# Patient Record
Sex: FEMALE | Race: ASIAN | Employment: OTHER | URBAN - METROPOLITAN AREA
[De-identification: names, ages, dates, MRNs, and addresses within clinical notes are randomized per-mention and may not be internally consistent; named-entity substitution may affect disease eponyms.]

---

## 2017-01-03 ENCOUNTER — RECORDS - HEALTHEAST (OUTPATIENT)
Dept: GENERAL RADIOLOGY | Facility: CLINIC | Age: 77
End: 2017-01-03

## 2017-01-03 ENCOUNTER — OFFICE VISIT - HEALTHEAST (OUTPATIENT)
Dept: FAMILY MEDICINE | Facility: CLINIC | Age: 77
End: 2017-01-03

## 2017-01-03 ENCOUNTER — AMBULATORY - HEALTHEAST (OUTPATIENT)
Dept: FAMILY MEDICINE | Facility: CLINIC | Age: 77
End: 2017-01-03

## 2017-01-03 DIAGNOSIS — R14.0 ABDOMINAL DISTENSION (GASEOUS): ICD-10-CM

## 2017-01-03 DIAGNOSIS — R14.0 BLOATING: ICD-10-CM

## 2017-01-03 DIAGNOSIS — R09.89 BRUIT OF RIGHT CAROTID ARTERY: ICD-10-CM

## 2017-01-03 DIAGNOSIS — D64.9 ANEMIA, UNSPECIFIED: ICD-10-CM

## 2017-01-03 DIAGNOSIS — K80.20 CHOLELITHIASES: ICD-10-CM

## 2017-01-03 DIAGNOSIS — E55.9 VITAMIN D DEFICIENCY: ICD-10-CM

## 2017-01-03 DIAGNOSIS — I25.10 CORONARY ATHEROSCLEROSIS: ICD-10-CM

## 2017-01-03 DIAGNOSIS — I25.10 ATHEROSCLEROTIC HEART DISEASE OF NATIVE CORONARY ARTERY WITHOUT ANGINA PECTORIS: ICD-10-CM

## 2017-01-03 DIAGNOSIS — I50.33 ACUTE ON CHRONIC DIASTOLIC CHF (CONGESTIVE HEART FAILURE), NYHA CLASS 3 (H): ICD-10-CM

## 2017-01-03 DIAGNOSIS — H26.9 CATARACTS, BILATERAL: ICD-10-CM

## 2017-01-03 DIAGNOSIS — K14.3 COATED TONGUE: ICD-10-CM

## 2017-01-03 DIAGNOSIS — E11.9 DIABETES (H): ICD-10-CM

## 2017-01-03 LAB — HBA1C MFR BLD: 9.1 % (ref 3.5–6)

## 2017-01-04 ENCOUNTER — COMMUNICATION - HEALTHEAST (OUTPATIENT)
Dept: FAMILY MEDICINE | Facility: CLINIC | Age: 77
End: 2017-01-04

## 2017-01-04 LAB
ATRIAL RATE - MUSE: 65 BPM
BASOPHILS # BLD AUTO: 0 THOU/UL (ref 0–0.2)
BASOPHILS NFR BLD AUTO: 1 % (ref 0–2)
DIASTOLIC BLOOD PRESSURE - MUSE: NORMAL MMHG
EOSINOPHIL # BLD AUTO: 0.2 THOU/UL (ref 0–0.4)
EOSINOPHIL NFR BLD AUTO: 3 % (ref 0–6)
ERYTHROCYTE [DISTWIDTH] IN BLOOD BY AUTOMATED COUNT: 12.5 % (ref 11–14.5)
HCT VFR BLD AUTO: 39.1 % (ref 35–47)
HGB BLD-MCNC: 12.1 G/DL (ref 12–16)
INTERPRETATION ECG - MUSE: NORMAL
LAB AP CHARGES (HE HISTORICAL CONVERSION): NORMAL
LYMPHOCYTES # BLD AUTO: 1.4 THOU/UL (ref 0.8–4.4)
LYMPHOCYTES NFR BLD AUTO: 26 % (ref 20–40)
MCH RBC QN AUTO: 27.6 PG (ref 27–34)
MCHC RBC AUTO-ENTMCNC: 30.9 G/DL (ref 32–36)
MCV RBC AUTO: 89 FL (ref 80–100)
MONOCYTES # BLD AUTO: 0.6 THOU/UL (ref 0–0.9)
MONOCYTES NFR BLD AUTO: 11 % (ref 2–10)
NEUTROPHILS # BLD AUTO: 3.3 THOU/UL (ref 2–7.7)
NEUTROPHILS NFR BLD AUTO: 60 % (ref 50–70)
P AXIS - MUSE: 55 DEGREES
PATH REPORT.COMMENTS IMP SPEC: NORMAL
PATH REPORT.COMMENTS IMP SPEC: NORMAL
PATH REPORT.FINAL DX SPEC: NORMAL
PATH REPORT.MICROSCOPIC SPEC OTHER STN: ABNORMAL
PATH REPORT.RELEVANT HX SPEC: NORMAL
PLATELET # BLD AUTO: 197 THOU/UL (ref 140–440)
PMV BLD AUTO: 12.2 FL (ref 8.5–12.5)
PR INTERVAL - MUSE: 190 MS
QRS DURATION - MUSE: 148 MS
QT - MUSE: 514 MS
QTC - MUSE: 534 MS
R AXIS - MUSE: 10 DEGREES
RBC # BLD AUTO: 4.38 MILL/UL (ref 3.8–5.4)
SYSTOLIC BLOOD PRESSURE - MUSE: NORMAL MMHG
T AXIS - MUSE: 162 DEGREES
VENTRICULAR RATE- MUSE: 65 BPM
WBC: 5.6 THOU/UL (ref 4–11)

## 2017-01-09 ENCOUNTER — COMMUNICATION - HEALTHEAST (OUTPATIENT)
Dept: FAMILY MEDICINE | Facility: CLINIC | Age: 77
End: 2017-01-09

## 2017-01-11 ENCOUNTER — AMBULATORY - HEALTHEAST (OUTPATIENT)
Dept: FAMILY MEDICINE | Facility: CLINIC | Age: 77
End: 2017-01-11

## 2017-01-11 DIAGNOSIS — K80.20 GALLSTONES: ICD-10-CM

## 2017-01-18 ENCOUNTER — AMBULATORY - HEALTHEAST (OUTPATIENT)
Dept: SURGERY | Facility: CLINIC | Age: 77
End: 2017-01-18

## 2017-01-18 ENCOUNTER — OFFICE VISIT - HEALTHEAST (OUTPATIENT)
Dept: SURGERY | Facility: CLINIC | Age: 77
End: 2017-01-18

## 2017-01-18 DIAGNOSIS — K27.9 PUD (PEPTIC ULCER DISEASE): ICD-10-CM

## 2017-01-18 DIAGNOSIS — K81.9 CHOLECYSTITIS: ICD-10-CM

## 2017-01-18 ASSESSMENT — MIFFLIN-ST. JEOR: SCORE: 858.5

## 2017-01-19 ENCOUNTER — RECORDS - HEALTHEAST (OUTPATIENT)
Dept: ADMINISTRATIVE | Facility: OTHER | Age: 77
End: 2017-01-19

## 2017-01-27 ENCOUNTER — RECORDS - HEALTHEAST (OUTPATIENT)
Dept: ADMINISTRATIVE | Facility: OTHER | Age: 77
End: 2017-01-27

## 2017-01-27 ENCOUNTER — COMMUNICATION - HEALTHEAST (OUTPATIENT)
Dept: FAMILY MEDICINE | Facility: CLINIC | Age: 77
End: 2017-01-27

## 2017-01-27 DIAGNOSIS — J44.89 CHRONIC AIRWAY OBSTRUCTION, NOT ELSEWHERE CLASSIFIED: ICD-10-CM

## 2017-02-01 ENCOUNTER — AMBULATORY - HEALTHEAST (OUTPATIENT)
Dept: FAMILY MEDICINE | Facility: CLINIC | Age: 77
End: 2017-02-01

## 2017-02-01 DIAGNOSIS — I25.10 CORONARY ATHEROSCLEROSIS: ICD-10-CM

## 2017-02-01 DIAGNOSIS — E11.39 TYPE 2 DIABETES MELLITUS WITH OPHTHALMIC MANIFESTATIONS (H): ICD-10-CM

## 2017-02-06 ENCOUNTER — COMMUNICATION - HEALTHEAST (OUTPATIENT)
Dept: FAMILY MEDICINE | Facility: CLINIC | Age: 77
End: 2017-02-06

## 2017-02-07 ENCOUNTER — SURGERY - HEALTHEAST (OUTPATIENT)
Dept: SURGERY | Facility: HOSPITAL | Age: 77
End: 2017-02-07

## 2017-02-07 ENCOUNTER — ANESTHESIA - HEALTHEAST (OUTPATIENT)
Dept: SURGERY | Facility: HOSPITAL | Age: 77
End: 2017-02-07

## 2017-02-13 ENCOUNTER — COMMUNICATION - HEALTHEAST (OUTPATIENT)
Dept: FAMILY MEDICINE | Facility: CLINIC | Age: 77
End: 2017-02-13

## 2017-02-15 ENCOUNTER — COMMUNICATION - HEALTHEAST (OUTPATIENT)
Dept: SURGERY | Facility: CLINIC | Age: 77
End: 2017-02-15

## 2017-02-17 ENCOUNTER — HOME CARE/HOSPICE - HEALTHEAST (OUTPATIENT)
Dept: HOME HEALTH SERVICES | Facility: HOME HEALTH | Age: 77
End: 2017-02-17

## 2017-02-17 ENCOUNTER — AMBULATORY - HEALTHEAST (OUTPATIENT)
Dept: FAMILY MEDICINE | Facility: CLINIC | Age: 77
End: 2017-02-17

## 2017-02-17 ENCOUNTER — COMMUNICATION - HEALTHEAST (OUTPATIENT)
Dept: FAMILY MEDICINE | Facility: CLINIC | Age: 77
End: 2017-02-17

## 2017-02-17 ENCOUNTER — OFFICE VISIT - HEALTHEAST (OUTPATIENT)
Dept: FAMILY MEDICINE | Facility: CLINIC | Age: 77
End: 2017-02-17

## 2017-02-17 DIAGNOSIS — I50.23 ACUTE ON CHRONIC SYSTOLIC CONGESTIVE HEART FAILURE (H): ICD-10-CM

## 2017-02-17 DIAGNOSIS — E11.9 DIABETES (H): ICD-10-CM

## 2017-02-17 DIAGNOSIS — D64.9 ANEMIA, UNSPECIFIED TYPE: ICD-10-CM

## 2017-02-17 DIAGNOSIS — J44.9 COPD (CHRONIC OBSTRUCTIVE PULMONARY DISEASE) (H): ICD-10-CM

## 2017-02-17 DIAGNOSIS — D64.9 ANEMIA, UNSPECIFIED: ICD-10-CM

## 2017-02-17 DIAGNOSIS — D64.9 ANEMIA: ICD-10-CM

## 2017-02-17 DIAGNOSIS — R53.83 FATIGUE: ICD-10-CM

## 2017-02-17 DIAGNOSIS — R09.89 BILATERAL CAROTID BRUITS: ICD-10-CM

## 2017-02-17 DIAGNOSIS — I25.10 CORONARY ATHEROSCLEROSIS: ICD-10-CM

## 2017-02-17 LAB — HBA1C MFR BLD: 7.9 % (ref 3.5–6)

## 2017-02-20 ENCOUNTER — AMBULATORY - HEALTHEAST (OUTPATIENT)
Dept: FAMILY MEDICINE | Facility: CLINIC | Age: 77
End: 2017-02-20

## 2017-02-20 ENCOUNTER — AMBULATORY - HEALTHEAST (OUTPATIENT)
Dept: LAB | Facility: CLINIC | Age: 77
End: 2017-02-20

## 2017-02-20 DIAGNOSIS — D64.9 ANEMIA, UNSPECIFIED TYPE: ICD-10-CM

## 2017-02-20 DIAGNOSIS — D50.0 ANEMIA, BLOOD LOSS: ICD-10-CM

## 2017-02-20 DIAGNOSIS — R06.02 SHORT OF BREATH ON EXERTION: ICD-10-CM

## 2017-02-20 DIAGNOSIS — D64.9 ANEMIA: ICD-10-CM

## 2017-02-21 ENCOUNTER — INFUSION - HEALTHEAST (OUTPATIENT)
Dept: INFUSION THERAPY | Facility: HOSPITAL | Age: 77
End: 2017-02-21

## 2017-02-21 ENCOUNTER — COMMUNICATION - HEALTHEAST (OUTPATIENT)
Dept: HOME HEALTH SERVICES | Facility: HOME HEALTH | Age: 77
End: 2017-02-21

## 2017-02-21 DIAGNOSIS — D64.9 ANEMIA: ICD-10-CM

## 2017-02-21 DIAGNOSIS — I42.9 CARDIOMYOPATHY (H): ICD-10-CM

## 2017-02-22 ENCOUNTER — HOME CARE/HOSPICE - HEALTHEAST (OUTPATIENT)
Dept: HOME HEALTH SERVICES | Facility: HOME HEALTH | Age: 77
End: 2017-02-22

## 2017-02-22 ENCOUNTER — AMBULATORY - HEALTHEAST (OUTPATIENT)
Dept: PULMONOLOGY | Facility: OTHER | Age: 77
End: 2017-02-22

## 2017-02-22 ENCOUNTER — RECORDS - HEALTHEAST (OUTPATIENT)
Dept: ADMINISTRATIVE | Facility: OTHER | Age: 77
End: 2017-02-22

## 2017-02-22 ENCOUNTER — COMMUNICATION - HEALTHEAST (OUTPATIENT)
Dept: SCHEDULING | Facility: CLINIC | Age: 77
End: 2017-02-22

## 2017-02-22 ENCOUNTER — COMMUNICATION - HEALTHEAST (OUTPATIENT)
Dept: FAMILY MEDICINE | Facility: CLINIC | Age: 77
End: 2017-02-22

## 2017-02-22 DIAGNOSIS — J44.9 COPD (CHRONIC OBSTRUCTIVE PULMONARY DISEASE) (H): ICD-10-CM

## 2017-02-22 DIAGNOSIS — I25.10 CORONARY ATHEROSCLEROSIS: ICD-10-CM

## 2017-02-22 DIAGNOSIS — I21.9 ACUTE MYOCARDIAL INFARCTION (H): ICD-10-CM

## 2017-02-22 LAB
BLD PROD TYP BPU: NORMAL
BLD PROD TYP BPU: NORMAL
BLOOD EXPIRATION DATE: NORMAL
BLOOD EXPIRATION DATE: NORMAL
BLOOD TYPE: 600
BLOOD TYPE: 6200
CODING SYSTEM: NORMAL
CODING SYSTEM: NORMAL
COMPONENT (HISTORICAL CONVERSION): NORMAL
COMPONENT (HISTORICAL CONVERSION): NORMAL
CROSSMATCH: NORMAL
CROSSMATCH: NORMAL
ISSUE DATE AND TIME: NORMAL
ISSUE DATE AND TIME: NORMAL
STATUS (HISTORICAL CONVERSION): NORMAL
STATUS (HISTORICAL CONVERSION): NORMAL
UNIT ABO/RH (HISTORICAL CONVERSION): NORMAL
UNIT ABO/RH (HISTORICAL CONVERSION): NORMAL
UNIT NUMBER: NORMAL
UNIT NUMBER: NORMAL

## 2017-02-23 ENCOUNTER — HOME CARE/HOSPICE - HEALTHEAST (OUTPATIENT)
Dept: HOME HEALTH SERVICES | Facility: HOME HEALTH | Age: 77
End: 2017-02-23

## 2017-02-23 ENCOUNTER — COMMUNICATION - HEALTHEAST (OUTPATIENT)
Dept: HOME HEALTH SERVICES | Facility: HOME HEALTH | Age: 77
End: 2017-02-23

## 2017-02-24 ENCOUNTER — COMMUNICATION - HEALTHEAST (OUTPATIENT)
Dept: FAMILY MEDICINE | Facility: CLINIC | Age: 77
End: 2017-02-24

## 2017-02-24 DIAGNOSIS — I21.9 ACUTE MYOCARDIAL INFARCTION (H): ICD-10-CM

## 2017-02-24 DIAGNOSIS — I25.10 CORONARY ATHEROSCLEROSIS: ICD-10-CM

## 2017-02-25 ENCOUNTER — HOME CARE/HOSPICE - HEALTHEAST (OUTPATIENT)
Dept: HOME HEALTH SERVICES | Facility: HOME HEALTH | Age: 77
End: 2017-02-25

## 2017-03-01 ENCOUNTER — HOME CARE/HOSPICE - HEALTHEAST (OUTPATIENT)
Dept: HOME HEALTH SERVICES | Facility: HOME HEALTH | Age: 77
End: 2017-03-01

## 2017-03-02 ENCOUNTER — COMMUNICATION - HEALTHEAST (OUTPATIENT)
Dept: HOME HEALTH SERVICES | Facility: HOME HEALTH | Age: 77
End: 2017-03-02

## 2017-03-03 ENCOUNTER — HOME CARE/HOSPICE - HEALTHEAST (OUTPATIENT)
Dept: HOME HEALTH SERVICES | Facility: HOME HEALTH | Age: 77
End: 2017-03-03

## 2017-03-06 ENCOUNTER — OFFICE VISIT - HEALTHEAST (OUTPATIENT)
Dept: CARDIOLOGY | Facility: CLINIC | Age: 77
End: 2017-03-06

## 2017-03-06 ENCOUNTER — AMBULATORY - HEALTHEAST (OUTPATIENT)
Dept: CARDIOLOGY | Facility: CLINIC | Age: 77
End: 2017-03-06

## 2017-03-06 DIAGNOSIS — I50.23 ACUTE ON CHRONIC SYSTOLIC CONGESTIVE HEART FAILURE (H): ICD-10-CM

## 2017-03-06 DIAGNOSIS — I25.89 OTHER SPECIFIED FORMS OF CHRONIC ISCHEMIC HEART DISEASE: ICD-10-CM

## 2017-03-06 ASSESSMENT — MIFFLIN-ST. JEOR: SCORE: 832.65

## 2017-03-08 ENCOUNTER — HOME CARE/HOSPICE - HEALTHEAST (OUTPATIENT)
Dept: HOME HEALTH SERVICES | Facility: HOME HEALTH | Age: 77
End: 2017-03-08

## 2017-03-08 ENCOUNTER — COMMUNICATION - HEALTHEAST (OUTPATIENT)
Dept: CARDIOLOGY | Facility: CLINIC | Age: 77
End: 2017-03-08

## 2017-03-09 ENCOUNTER — COMMUNICATION - HEALTHEAST (OUTPATIENT)
Dept: HOME HEALTH SERVICES | Facility: HOME HEALTH | Age: 77
End: 2017-03-09

## 2017-03-10 ENCOUNTER — HOME CARE/HOSPICE - HEALTHEAST (OUTPATIENT)
Dept: HOME HEALTH SERVICES | Facility: HOME HEALTH | Age: 77
End: 2017-03-10

## 2017-03-13 ENCOUNTER — COMMUNICATION - HEALTHEAST (OUTPATIENT)
Dept: FAMILY MEDICINE | Facility: CLINIC | Age: 77
End: 2017-03-13

## 2017-03-13 ENCOUNTER — RECORDS - HEALTHEAST (OUTPATIENT)
Dept: ADMINISTRATIVE | Facility: OTHER | Age: 77
End: 2017-03-13

## 2017-03-15 ENCOUNTER — HOME CARE/HOSPICE - HEALTHEAST (OUTPATIENT)
Dept: HOME HEALTH SERVICES | Facility: HOME HEALTH | Age: 77
End: 2017-03-15

## 2017-03-16 ENCOUNTER — HOME CARE/HOSPICE - HEALTHEAST (OUTPATIENT)
Dept: HOME HEALTH SERVICES | Facility: HOME HEALTH | Age: 77
End: 2017-03-16

## 2017-03-17 ENCOUNTER — OFFICE VISIT - HEALTHEAST (OUTPATIENT)
Dept: NURSING | Facility: CLINIC | Age: 77
End: 2017-03-17

## 2017-03-17 DIAGNOSIS — I50.9 CONGESTIVE HEART FAILURE, UNSPECIFIED CONGESTIVE HEART FAILURE CHRONICITY, UNSPECIFIED CONGESTIVE HEART FAILURE TYPE: ICD-10-CM

## 2017-03-17 DIAGNOSIS — K21.9 GASTROESOPHAGEAL REFLUX DISEASE, ESOPHAGITIS PRESENCE NOT SPECIFIED: ICD-10-CM

## 2017-03-17 DIAGNOSIS — J44.89 CHRONIC AIRWAY OBSTRUCTION, NOT ELSEWHERE CLASSIFIED: ICD-10-CM

## 2017-03-17 DIAGNOSIS — E11.319 TYPE 2 DIABETES MELLITUS WITH RETINOPATHY, WITH LONG-TERM CURRENT USE OF INSULIN, MACULAR EDEMA PRESENCE UNSPECIFIED, UNSPECIFIED LATERALITY, UNSPECIFIED RETINOPATHY SEVERITY (H): ICD-10-CM

## 2017-03-17 DIAGNOSIS — K59.00 CONSTIPATION, UNSPECIFIED CONSTIPATION TYPE: ICD-10-CM

## 2017-03-17 DIAGNOSIS — D64.9 ANEMIA, UNSPECIFIED: ICD-10-CM

## 2017-03-17 DIAGNOSIS — I25.89 OTHER SPECIFIED FORMS OF CHRONIC ISCHEMIC HEART DISEASE: ICD-10-CM

## 2017-03-17 DIAGNOSIS — Z79.4 TYPE 2 DIABETES MELLITUS WITH RETINOPATHY, WITH LONG-TERM CURRENT USE OF INSULIN, MACULAR EDEMA PRESENCE UNSPECIFIED, UNSPECIFIED LATERALITY, UNSPECIFIED RETINOPATHY SEVERITY (H): ICD-10-CM

## 2017-03-17 DIAGNOSIS — I24.9 ACS (ACUTE CORONARY SYNDROME) (H): ICD-10-CM

## 2017-03-21 ENCOUNTER — AMBULATORY - HEALTHEAST (OUTPATIENT)
Dept: EDUCATION SERVICES | Facility: CLINIC | Age: 77
End: 2017-03-21

## 2017-03-21 ENCOUNTER — RECORDS - HEALTHEAST (OUTPATIENT)
Dept: ADMINISTRATIVE | Facility: OTHER | Age: 77
End: 2017-03-21

## 2017-03-21 DIAGNOSIS — E11.39 TYPE 2 DIABETES MELLITUS WITH OPHTHALMIC MANIFESTATIONS (H): ICD-10-CM

## 2017-03-21 DIAGNOSIS — E11.65 UNCONTROLLED TYPE 2 DIABETES MELLITUS WITH HYPERGLYCEMIA, WITH LONG-TERM CURRENT USE OF INSULIN (H): ICD-10-CM

## 2017-03-21 DIAGNOSIS — Z79.4 UNCONTROLLED TYPE 2 DIABETES MELLITUS WITH HYPERGLYCEMIA, WITH LONG-TERM CURRENT USE OF INSULIN (H): ICD-10-CM

## 2017-03-22 ENCOUNTER — HOME CARE/HOSPICE - HEALTHEAST (OUTPATIENT)
Dept: HOME HEALTH SERVICES | Facility: HOME HEALTH | Age: 77
End: 2017-03-22

## 2017-03-27 ENCOUNTER — OFFICE VISIT - HEALTHEAST (OUTPATIENT)
Dept: CARDIOLOGY | Facility: CLINIC | Age: 77
End: 2017-03-27

## 2017-03-27 DIAGNOSIS — I50.22 CHRONIC SYSTOLIC CHF (CONGESTIVE HEART FAILURE), NYHA CLASS 3 (H): ICD-10-CM

## 2017-03-27 DIAGNOSIS — E78.2 MIXED HYPERLIPIDEMIA: ICD-10-CM

## 2017-03-27 DIAGNOSIS — I10 ESSENTIAL HYPERTENSION: ICD-10-CM

## 2017-03-27 DIAGNOSIS — I25.10 CORONARY ARTERY DISEASE INVOLVING NATIVE CORONARY ARTERY OF NATIVE HEART WITHOUT ANGINA PECTORIS: ICD-10-CM

## 2017-03-27 DIAGNOSIS — I25.89 OTHER SPECIFIED FORMS OF CHRONIC ISCHEMIC HEART DISEASE: ICD-10-CM

## 2017-03-27 ASSESSMENT — MIFFLIN-ST. JEOR: SCORE: 795

## 2017-03-31 ENCOUNTER — HOME CARE/HOSPICE - HEALTHEAST (OUTPATIENT)
Dept: HOME HEALTH SERVICES | Facility: HOME HEALTH | Age: 77
End: 2017-03-31

## 2017-04-07 ENCOUNTER — HOME CARE/HOSPICE - HEALTHEAST (OUTPATIENT)
Dept: HOME HEALTH SERVICES | Facility: HOME HEALTH | Age: 77
End: 2017-04-07

## 2017-04-12 ENCOUNTER — COMMUNICATION - HEALTHEAST (OUTPATIENT)
Dept: FAMILY MEDICINE | Facility: CLINIC | Age: 77
End: 2017-04-12

## 2017-04-13 ENCOUNTER — AMBULATORY - HEALTHEAST (OUTPATIENT)
Dept: FAMILY MEDICINE | Facility: CLINIC | Age: 77
End: 2017-04-13

## 2017-04-13 DIAGNOSIS — S05.90XA EYE INJURY: ICD-10-CM

## 2017-04-14 ENCOUNTER — OFFICE VISIT - HEALTHEAST (OUTPATIENT)
Dept: FAMILY MEDICINE | Facility: CLINIC | Age: 77
End: 2017-04-14

## 2017-04-14 DIAGNOSIS — D64.9 ANEMIA: ICD-10-CM

## 2017-04-14 DIAGNOSIS — M53.3 SACROILIAC DYSFUNCTION: ICD-10-CM

## 2017-04-14 DIAGNOSIS — I10 ESSENTIAL HYPERTENSION: ICD-10-CM

## 2017-04-14 DIAGNOSIS — I25.89 OTHER SPECIFIED FORMS OF CHRONIC ISCHEMIC HEART DISEASE: ICD-10-CM

## 2017-04-14 DIAGNOSIS — E11.9 DIABETES (H): ICD-10-CM

## 2017-04-14 LAB — HBA1C MFR BLD: 8.5 % (ref 3.5–6)

## 2017-04-15 ENCOUNTER — HOME CARE/HOSPICE - HEALTHEAST (OUTPATIENT)
Dept: HOME HEALTH SERVICES | Facility: HOME HEALTH | Age: 77
End: 2017-04-15

## 2017-04-17 ENCOUNTER — OFFICE VISIT - HEALTHEAST (OUTPATIENT)
Dept: CARDIOLOGY | Facility: CLINIC | Age: 77
End: 2017-04-17

## 2017-04-17 ENCOUNTER — COMMUNICATION - HEALTHEAST (OUTPATIENT)
Dept: FAMILY MEDICINE | Facility: CLINIC | Age: 77
End: 2017-04-17

## 2017-04-17 DIAGNOSIS — I25.89 OTHER SPECIFIED FORMS OF CHRONIC ISCHEMIC HEART DISEASE: ICD-10-CM

## 2017-04-17 ASSESSMENT — MIFFLIN-ST. JEOR: SCORE: 804.07

## 2017-04-18 ENCOUNTER — INFUSION - HEALTHEAST (OUTPATIENT)
Dept: INFUSION THERAPY | Facility: HOSPITAL | Age: 77
End: 2017-04-18

## 2017-04-18 DIAGNOSIS — I25.89 OTHER SPECIFIED FORMS OF CHRONIC ISCHEMIC HEART DISEASE: ICD-10-CM

## 2017-04-19 ENCOUNTER — HOME CARE/HOSPICE - HEALTHEAST (OUTPATIENT)
Dept: HOME HEALTH SERVICES | Facility: HOME HEALTH | Age: 77
End: 2017-04-19

## 2017-04-19 ENCOUNTER — COMMUNICATION - HEALTHEAST (OUTPATIENT)
Dept: HOME HEALTH SERVICES | Facility: HOME HEALTH | Age: 77
End: 2017-04-19

## 2017-04-19 ENCOUNTER — COMMUNICATION - HEALTHEAST (OUTPATIENT)
Dept: ADMINISTRATIVE | Facility: CLINIC | Age: 77
End: 2017-04-19

## 2017-04-21 ENCOUNTER — COMMUNICATION - HEALTHEAST (OUTPATIENT)
Dept: NURSING | Facility: CLINIC | Age: 77
End: 2017-04-21

## 2017-04-21 ENCOUNTER — HOME CARE/HOSPICE - HEALTHEAST (OUTPATIENT)
Dept: HOME HEALTH SERVICES | Facility: HOME HEALTH | Age: 77
End: 2017-04-21

## 2017-04-21 DIAGNOSIS — I25.89 OTHER SPECIFIED FORMS OF CHRONIC ISCHEMIC HEART DISEASE: ICD-10-CM

## 2017-04-24 ENCOUNTER — AMBULATORY - HEALTHEAST (OUTPATIENT)
Dept: PHYSICAL MEDICINE AND REHAB | Facility: CLINIC | Age: 77
End: 2017-04-24

## 2017-04-24 ENCOUNTER — HOSPITAL ENCOUNTER (OUTPATIENT)
Dept: PHYSICAL MEDICINE AND REHAB | Facility: CLINIC | Age: 77
Discharge: HOME | End: 2017-04-24
Attending: FAMILY MEDICINE

## 2017-04-24 DIAGNOSIS — G89.29 CHRONIC BILATERAL LOW BACK PAIN WITHOUT SCIATICA: ICD-10-CM

## 2017-04-24 DIAGNOSIS — M54.2 NECK PAIN, BILATERAL: ICD-10-CM

## 2017-04-24 DIAGNOSIS — G89.29 CHRONIC BILATERAL THORACIC BACK PAIN: ICD-10-CM

## 2017-04-24 DIAGNOSIS — M54.50 CHRONIC BILATERAL LOW BACK PAIN WITHOUT SCIATICA: ICD-10-CM

## 2017-04-24 DIAGNOSIS — M54.6 CHRONIC BILATERAL THORACIC BACK PAIN: ICD-10-CM

## 2017-04-26 ENCOUNTER — COMMUNICATION - HEALTHEAST (OUTPATIENT)
Dept: SCHEDULING | Facility: CLINIC | Age: 77
End: 2017-04-26

## 2017-04-26 ENCOUNTER — HOME CARE/HOSPICE - HEALTHEAST (OUTPATIENT)
Dept: HOME HEALTH SERVICES | Facility: HOME HEALTH | Age: 77
End: 2017-04-26

## 2017-04-26 ENCOUNTER — COMMUNICATION - HEALTHEAST (OUTPATIENT)
Dept: HOME HEALTH SERVICES | Facility: HOME HEALTH | Age: 77
End: 2017-04-26

## 2017-04-27 ENCOUNTER — HOME CARE/HOSPICE - HEALTHEAST (OUTPATIENT)
Dept: HOME HEALTH SERVICES | Facility: HOME HEALTH | Age: 77
End: 2017-04-27

## 2017-04-28 ENCOUNTER — AMBULATORY - HEALTHEAST (OUTPATIENT)
Dept: FAMILY MEDICINE | Facility: CLINIC | Age: 77
End: 2017-04-28

## 2017-04-28 ENCOUNTER — HOME CARE/HOSPICE - HEALTHEAST (OUTPATIENT)
Dept: HOME HEALTH SERVICES | Facility: HOME HEALTH | Age: 77
End: 2017-04-28

## 2017-04-28 DIAGNOSIS — R46.89 COGNITIVE AND BEHAVIORAL CHANGES: ICD-10-CM

## 2017-04-28 DIAGNOSIS — R13.10 SWALLOWING DYSFUNCTION: ICD-10-CM

## 2017-04-28 DIAGNOSIS — R41.89 COGNITIVE AND BEHAVIORAL CHANGES: ICD-10-CM

## 2017-05-05 ENCOUNTER — HOME CARE/HOSPICE - HEALTHEAST (OUTPATIENT)
Dept: HOME HEALTH SERVICES | Facility: HOME HEALTH | Age: 77
End: 2017-05-05

## 2017-05-06 ENCOUNTER — HOME CARE/HOSPICE - HEALTHEAST (OUTPATIENT)
Dept: HOME HEALTH SERVICES | Facility: HOME HEALTH | Age: 77
End: 2017-05-06

## 2017-05-12 ENCOUNTER — HOME CARE/HOSPICE - HEALTHEAST (OUTPATIENT)
Dept: HOME HEALTH SERVICES | Facility: HOME HEALTH | Age: 77
End: 2017-05-12

## 2017-05-14 ENCOUNTER — RECORDS - HEALTHEAST (OUTPATIENT)
Dept: ADMINISTRATIVE | Facility: OTHER | Age: 77
End: 2017-05-14

## 2017-05-19 ENCOUNTER — HOME CARE/HOSPICE - HEALTHEAST (OUTPATIENT)
Dept: HOME HEALTH SERVICES | Facility: HOME HEALTH | Age: 77
End: 2017-05-19

## 2017-05-19 ENCOUNTER — COMMUNICATION - HEALTHEAST (OUTPATIENT)
Dept: FAMILY MEDICINE | Facility: CLINIC | Age: 77
End: 2017-05-19

## 2017-05-23 ENCOUNTER — COMMUNICATION - HEALTHEAST (OUTPATIENT)
Dept: FAMILY MEDICINE | Facility: CLINIC | Age: 77
End: 2017-05-23

## 2017-05-26 ENCOUNTER — HOME CARE/HOSPICE - HEALTHEAST (OUTPATIENT)
Dept: HOME HEALTH SERVICES | Facility: HOME HEALTH | Age: 77
End: 2017-05-26

## 2017-05-27 ENCOUNTER — HOME CARE/HOSPICE - HEALTHEAST (OUTPATIENT)
Dept: HOME HEALTH SERVICES | Facility: HOME HEALTH | Age: 77
End: 2017-05-27

## 2017-05-30 ENCOUNTER — HOME CARE/HOSPICE - HEALTHEAST (OUTPATIENT)
Dept: HOME HEALTH SERVICES | Facility: HOME HEALTH | Age: 77
End: 2017-05-30

## 2017-05-31 ENCOUNTER — COMMUNICATION - HEALTHEAST (OUTPATIENT)
Dept: HOME HEALTH SERVICES | Facility: HOME HEALTH | Age: 77
End: 2017-05-31

## 2017-06-02 ENCOUNTER — HOME CARE/HOSPICE - HEALTHEAST (OUTPATIENT)
Dept: HOME HEALTH SERVICES | Facility: HOME HEALTH | Age: 77
End: 2017-06-02

## 2017-06-06 ENCOUNTER — HOME CARE/HOSPICE - HEALTHEAST (OUTPATIENT)
Dept: HOME HEALTH SERVICES | Facility: HOME HEALTH | Age: 77
End: 2017-06-06

## 2017-06-13 ENCOUNTER — COMMUNICATION - HEALTHEAST (OUTPATIENT)
Dept: FAMILY MEDICINE | Facility: CLINIC | Age: 77
End: 2017-06-13

## 2017-06-13 ENCOUNTER — COMMUNICATION - HEALTHEAST (OUTPATIENT)
Dept: HOME HEALTH SERVICES | Facility: HOME HEALTH | Age: 77
End: 2017-06-13

## 2017-06-13 ENCOUNTER — HOME CARE/HOSPICE - HEALTHEAST (OUTPATIENT)
Dept: HOME HEALTH SERVICES | Facility: HOME HEALTH | Age: 77
End: 2017-06-13

## 2017-06-13 DIAGNOSIS — I25.10 CORONARY ATHEROSCLEROSIS: ICD-10-CM

## 2017-06-15 ENCOUNTER — COMMUNICATION - HEALTHEAST (OUTPATIENT)
Dept: FAMILY MEDICINE | Facility: CLINIC | Age: 77
End: 2017-06-15

## 2017-06-15 DIAGNOSIS — I50.9 HEART FAILURE (H): ICD-10-CM

## 2017-06-16 ENCOUNTER — HOME CARE/HOSPICE - HEALTHEAST (OUTPATIENT)
Dept: HOME HEALTH SERVICES | Facility: HOME HEALTH | Age: 77
End: 2017-06-16

## 2017-06-21 ENCOUNTER — COMMUNICATION - HEALTHEAST (OUTPATIENT)
Dept: ADMINISTRATIVE | Facility: CLINIC | Age: 77
End: 2017-06-21

## 2017-07-13 ENCOUNTER — COMMUNICATION - HEALTHEAST (OUTPATIENT)
Dept: FAMILY MEDICINE | Facility: CLINIC | Age: 77
End: 2017-07-13

## 2017-07-14 ENCOUNTER — RECORDS - HEALTHEAST (OUTPATIENT)
Dept: ADMINISTRATIVE | Facility: OTHER | Age: 77
End: 2017-07-14

## 2017-08-04 ENCOUNTER — COMMUNICATION - HEALTHEAST (OUTPATIENT)
Dept: FAMILY MEDICINE | Facility: CLINIC | Age: 77
End: 2017-08-04

## 2017-08-15 ENCOUNTER — COMMUNICATION - HEALTHEAST (OUTPATIENT)
Dept: FAMILY MEDICINE | Facility: CLINIC | Age: 77
End: 2017-08-15

## 2017-08-15 DIAGNOSIS — E78.2 MIXED HYPERLIPIDEMIA: ICD-10-CM

## 2017-08-29 ENCOUNTER — COMMUNICATION - HEALTHEAST (OUTPATIENT)
Dept: PHYSICAL MEDICINE AND REHAB | Facility: CLINIC | Age: 77
End: 2017-08-29

## 2017-08-29 DIAGNOSIS — M54.50 CHRONIC BILATERAL LOW BACK PAIN WITHOUT SCIATICA: ICD-10-CM

## 2017-08-29 DIAGNOSIS — G89.29 CHRONIC BILATERAL LOW BACK PAIN WITHOUT SCIATICA: ICD-10-CM

## 2017-09-05 ENCOUNTER — AMBULATORY - HEALTHEAST (OUTPATIENT)
Dept: FAMILY MEDICINE | Facility: CLINIC | Age: 77
End: 2017-09-05

## 2017-09-05 ENCOUNTER — OFFICE VISIT - HEALTHEAST (OUTPATIENT)
Dept: FAMILY MEDICINE | Facility: CLINIC | Age: 77
End: 2017-09-05

## 2017-09-05 DIAGNOSIS — Z00.00 PREVENTATIVE HEALTH CARE: ICD-10-CM

## 2017-09-05 DIAGNOSIS — E11.9 DIABETES (H): ICD-10-CM

## 2017-09-05 DIAGNOSIS — H26.9 CATARACT: ICD-10-CM

## 2017-09-05 DIAGNOSIS — I25.10 CORONARY ARTERY DISEASE INVOLVING NATIVE CORONARY ARTERY OF NATIVE HEART WITHOUT ANGINA PECTORIS: ICD-10-CM

## 2017-09-05 DIAGNOSIS — J44.89 CHRONIC AIRWAY OBSTRUCTION, NOT ELSEWHERE CLASSIFIED: ICD-10-CM

## 2017-09-05 DIAGNOSIS — H53.9 VISION CHANGES: ICD-10-CM

## 2017-09-05 DIAGNOSIS — N18.30 CKD (CHRONIC KIDNEY DISEASE) STAGE 3, GFR 30-59 ML/MIN (H): ICD-10-CM

## 2017-09-05 DIAGNOSIS — Z79.4 TYPE 2 DIABETES MELLITUS WITH RETINOPATHY, WITH LONG-TERM CURRENT USE OF INSULIN, MACULAR EDEMA PRESENCE UNSPECIFIED, UNSPECIFIED LATERALITY, UNSPECIFIED RETINOPATHY SEVERITY (H): ICD-10-CM

## 2017-09-05 DIAGNOSIS — I11.0 HYPERTENSIVE HEART DISEASE WITH HEART FAILURE (H): ICD-10-CM

## 2017-09-05 DIAGNOSIS — R09.89 BRUIT OF RIGHT CAROTID ARTERY: ICD-10-CM

## 2017-09-05 DIAGNOSIS — I25.89 OTHER SPECIFIED FORMS OF CHRONIC ISCHEMIC HEART DISEASE: ICD-10-CM

## 2017-09-05 DIAGNOSIS — E78.2 MIXED HYPERLIPIDEMIA: ICD-10-CM

## 2017-09-05 DIAGNOSIS — E11.319 TYPE 2 DIABETES MELLITUS WITH RETINOPATHY, WITH LONG-TERM CURRENT USE OF INSULIN, MACULAR EDEMA PRESENCE UNSPECIFIED, UNSPECIFIED LATERALITY, UNSPECIFIED RETINOPATHY SEVERITY (H): ICD-10-CM

## 2017-09-05 DIAGNOSIS — D64.9 ANEMIA: ICD-10-CM

## 2017-09-05 LAB — HBA1C MFR BLD: 7.9 % (ref 3.5–6)

## 2017-09-11 ENCOUNTER — COMMUNICATION - HEALTHEAST (OUTPATIENT)
Dept: FAMILY MEDICINE | Facility: CLINIC | Age: 77
End: 2017-09-11

## 2017-09-18 ENCOUNTER — OFFICE VISIT - HEALTHEAST (OUTPATIENT)
Dept: NURSING | Facility: CLINIC | Age: 77
End: 2017-09-18

## 2017-09-18 DIAGNOSIS — Z79.4 TYPE 2 DIABETES MELLITUS WITH RETINOPATHY, WITH LONG-TERM CURRENT USE OF INSULIN, MACULAR EDEMA PRESENCE UNSPECIFIED, UNSPECIFIED LATERALITY, UNSPECIFIED RETINOPATHY SEVERITY (H): ICD-10-CM

## 2017-09-18 DIAGNOSIS — I25.89 OTHER SPECIFIED FORMS OF CHRONIC ISCHEMIC HEART DISEASE: ICD-10-CM

## 2017-09-18 DIAGNOSIS — J44.89 CHRONIC AIRWAY OBSTRUCTION, NOT ELSEWHERE CLASSIFIED: ICD-10-CM

## 2017-09-18 DIAGNOSIS — D64.9 ANEMIA, UNSPECIFIED: ICD-10-CM

## 2017-09-18 DIAGNOSIS — M25.519 ARTHRALGIA OF SHOULDER, UNSPECIFIED LATERALITY: ICD-10-CM

## 2017-09-18 DIAGNOSIS — I25.119 ATHEROSCLEROSIS OF CORONARY ARTERY OF NATIVE HEART WITH ANGINA PECTORIS, UNSPECIFIED VESSEL OR LESION TYPE (H): ICD-10-CM

## 2017-09-18 DIAGNOSIS — E11.319 TYPE 2 DIABETES MELLITUS WITH RETINOPATHY, WITH LONG-TERM CURRENT USE OF INSULIN, MACULAR EDEMA PRESENCE UNSPECIFIED, UNSPECIFIED LATERALITY, UNSPECIFIED RETINOPATHY SEVERITY (H): ICD-10-CM

## 2017-09-18 DIAGNOSIS — E11.39 TYPE 2 DIABETES MELLITUS WITH OPHTHALMIC MANIFESTATIONS (H): ICD-10-CM

## 2017-09-18 DIAGNOSIS — K21.9 GASTROESOPHAGEAL REFLUX DISEASE, ESOPHAGITIS PRESENCE NOT SPECIFIED: ICD-10-CM

## 2017-09-22 ENCOUNTER — COMMUNICATION - HEALTHEAST (OUTPATIENT)
Dept: FAMILY MEDICINE | Facility: CLINIC | Age: 77
End: 2017-09-22

## 2017-09-22 ENCOUNTER — AMBULATORY - HEALTHEAST (OUTPATIENT)
Dept: FAMILY MEDICINE | Facility: CLINIC | Age: 77
End: 2017-09-22

## 2017-09-22 DIAGNOSIS — N18.3 CKD (CHRONIC KIDNEY DISEASE), STAGE 3 (MODERATE): ICD-10-CM

## 2017-09-22 DIAGNOSIS — E87.5 HYPERKALEMIA: ICD-10-CM

## 2017-10-09 ENCOUNTER — OFFICE VISIT - HEALTHEAST (OUTPATIENT)
Dept: FAMILY MEDICINE | Facility: CLINIC | Age: 77
End: 2017-10-09

## 2017-10-09 ENCOUNTER — OFFICE VISIT - HEALTHEAST (OUTPATIENT)
Dept: NURSING | Facility: CLINIC | Age: 77
End: 2017-10-09

## 2017-10-09 DIAGNOSIS — Z12.11 SCREEN FOR COLON CANCER: ICD-10-CM

## 2017-10-09 DIAGNOSIS — N18.30 STAGE 3 CHRONIC KIDNEY DISEASE (H): ICD-10-CM

## 2017-10-09 DIAGNOSIS — I25.10 CORONARY ARTERY DISEASE INVOLVING NATIVE CORONARY ARTERY OF NATIVE HEART WITHOUT ANGINA PECTORIS: ICD-10-CM

## 2017-10-09 DIAGNOSIS — H35.00 BACKGROUND RETINOPATHY: ICD-10-CM

## 2017-10-09 DIAGNOSIS — J43.8 OTHER EMPHYSEMA (H): ICD-10-CM

## 2017-10-09 DIAGNOSIS — E78.2 MIXED HYPERLIPIDEMIA: ICD-10-CM

## 2017-10-09 DIAGNOSIS — E11.39 TYPE 2 DIABETES MELLITUS WITH OPHTHALMIC MANIFESTATIONS (H): ICD-10-CM

## 2017-10-09 DIAGNOSIS — R09.89 BRUIT OF RIGHT CAROTID ARTERY: ICD-10-CM

## 2017-10-09 DIAGNOSIS — E11.319 TYPE 2 DIABETES MELLITUS WITH RETINOPATHY, WITH LONG-TERM CURRENT USE OF INSULIN, MACULAR EDEMA PRESENCE UNSPECIFIED, UNSPECIFIED LATERALITY, UNSPECIFIED RETINOPATHY SEVERITY (H): ICD-10-CM

## 2017-10-09 DIAGNOSIS — N18.30 CKD (CHRONIC KIDNEY DISEASE) STAGE 3, GFR 30-59 ML/MIN (H): ICD-10-CM

## 2017-10-09 DIAGNOSIS — Z79.4 TYPE 2 DIABETES MELLITUS WITH RETINOPATHY, WITH LONG-TERM CURRENT USE OF INSULIN, MACULAR EDEMA PRESENCE UNSPECIFIED, UNSPECIFIED LATERALITY, UNSPECIFIED RETINOPATHY SEVERITY (H): ICD-10-CM

## 2017-10-09 DIAGNOSIS — D64.9 ANEMIA, UNSPECIFIED TYPE: ICD-10-CM

## 2017-10-09 DIAGNOSIS — K21.9 GASTROESOPHAGEAL REFLUX DISEASE, ESOPHAGITIS PRESENCE NOT SPECIFIED: ICD-10-CM

## 2017-10-09 DIAGNOSIS — I10 ESSENTIAL HYPERTENSION: ICD-10-CM

## 2017-10-09 DIAGNOSIS — E87.5 HYPERKALEMIA: ICD-10-CM

## 2017-10-09 DIAGNOSIS — I70.91 GENERALIZED ATHEROSCLEROSIS: ICD-10-CM

## 2017-10-09 DIAGNOSIS — I25.119 ATHEROSCLEROSIS OF CORONARY ARTERY OF NATIVE HEART WITH ANGINA PECTORIS, UNSPECIFIED VESSEL OR LESION TYPE (H): ICD-10-CM

## 2017-10-09 DIAGNOSIS — D50.9 IRON DEFICIENCY ANEMIA, UNSPECIFIED IRON DEFICIENCY ANEMIA TYPE: ICD-10-CM

## 2017-10-19 ENCOUNTER — COMMUNICATION - HEALTHEAST (OUTPATIENT)
Dept: FAMILY MEDICINE | Facility: CLINIC | Age: 77
End: 2017-10-19

## 2017-10-24 ENCOUNTER — COMMUNICATION - HEALTHEAST (OUTPATIENT)
Dept: FAMILY MEDICINE | Facility: CLINIC | Age: 77
End: 2017-10-24

## 2017-10-30 ENCOUNTER — OFFICE VISIT - HEALTHEAST (OUTPATIENT)
Dept: NURSING | Facility: CLINIC | Age: 77
End: 2017-10-30

## 2017-10-30 DIAGNOSIS — I25.10 CORONARY ARTERY DISEASE INVOLVING NATIVE CORONARY ARTERY OF NATIVE HEART WITHOUT ANGINA PECTORIS: ICD-10-CM

## 2017-10-30 DIAGNOSIS — E11.319 TYPE 2 DIABETES MELLITUS WITH RETINOPATHY, WITH LONG-TERM CURRENT USE OF INSULIN, MACULAR EDEMA PRESENCE UNSPECIFIED, UNSPECIFIED LATERALITY, UNSPECIFIED RETINOPATHY SEVERITY (H): ICD-10-CM

## 2017-10-30 DIAGNOSIS — G89.29 CHRONIC BILATERAL LOW BACK PAIN WITHOUT SCIATICA: ICD-10-CM

## 2017-10-30 DIAGNOSIS — I50.9 CONGESTIVE HEART FAILURE, UNSPECIFIED CONGESTIVE HEART FAILURE CHRONICITY, UNSPECIFIED CONGESTIVE HEART FAILURE TYPE: ICD-10-CM

## 2017-10-30 DIAGNOSIS — Z79.4 TYPE 2 DIABETES MELLITUS WITH RETINOPATHY, WITH LONG-TERM CURRENT USE OF INSULIN, MACULAR EDEMA PRESENCE UNSPECIFIED, UNSPECIFIED LATERALITY, UNSPECIFIED RETINOPATHY SEVERITY (H): ICD-10-CM

## 2017-10-30 DIAGNOSIS — M54.50 CHRONIC BILATERAL LOW BACK PAIN WITHOUT SCIATICA: ICD-10-CM

## 2017-10-31 ENCOUNTER — RECORDS - HEALTHEAST (OUTPATIENT)
Dept: ADMINISTRATIVE | Facility: OTHER | Age: 77
End: 2017-10-31

## 2017-11-21 ENCOUNTER — OFFICE VISIT - HEALTHEAST (OUTPATIENT)
Dept: FAMILY MEDICINE | Facility: CLINIC | Age: 77
End: 2017-11-21

## 2017-11-21 DIAGNOSIS — K80.20 CHOLELITHIASES: ICD-10-CM

## 2017-11-21 DIAGNOSIS — K59.00 CONSTIPATION, UNSPECIFIED CONSTIPATION TYPE: ICD-10-CM

## 2017-11-21 DIAGNOSIS — Z12.11 SCREEN FOR COLON CANCER: ICD-10-CM

## 2017-11-21 DIAGNOSIS — R14.0 POSTPRANDIAL ABDOMINAL BLOATING: ICD-10-CM

## 2017-11-21 ASSESSMENT — MIFFLIN-ST. JEOR: SCORE: 855.1

## 2017-11-27 ENCOUNTER — OFFICE VISIT - HEALTHEAST (OUTPATIENT)
Dept: SURGERY | Facility: CLINIC | Age: 77
End: 2017-11-27

## 2017-11-27 ENCOUNTER — OFFICE VISIT - HEALTHEAST (OUTPATIENT)
Dept: NURSING | Facility: CLINIC | Age: 77
End: 2017-11-27

## 2017-11-27 DIAGNOSIS — K81.9 CHOLECYSTITIS: ICD-10-CM

## 2017-11-27 DIAGNOSIS — M25.562 ACUTE PAIN OF LEFT KNEE: ICD-10-CM

## 2017-11-27 DIAGNOSIS — E78.2 MIXED HYPERLIPIDEMIA: ICD-10-CM

## 2017-11-27 DIAGNOSIS — E11.319 TYPE 2 DIABETES MELLITUS WITH RETINOPATHY, WITH LONG-TERM CURRENT USE OF INSULIN, MACULAR EDEMA PRESENCE UNSPECIFIED, UNSPECIFIED LATERALITY, UNSPECIFIED RETINOPATHY SEVERITY (H): ICD-10-CM

## 2017-11-27 DIAGNOSIS — Z79.4 TYPE 2 DIABETES MELLITUS WITH RETINOPATHY, WITH LONG-TERM CURRENT USE OF INSULIN, MACULAR EDEMA PRESENCE UNSPECIFIED, UNSPECIFIED LATERALITY, UNSPECIFIED RETINOPATHY SEVERITY (H): ICD-10-CM

## 2017-11-27 ASSESSMENT — MIFFLIN-ST. JEOR: SCORE: 872.11

## 2017-11-30 ENCOUNTER — RECORDS - HEALTHEAST (OUTPATIENT)
Dept: GENERAL RADIOLOGY | Facility: CLINIC | Age: 77
End: 2017-11-30

## 2017-11-30 ENCOUNTER — OFFICE VISIT - HEALTHEAST (OUTPATIENT)
Dept: FAMILY MEDICINE | Facility: CLINIC | Age: 77
End: 2017-11-30

## 2017-11-30 DIAGNOSIS — S89.92XS: ICD-10-CM

## 2017-11-30 DIAGNOSIS — M25.569 PAIN IN UNSPECIFIED KNEE: ICD-10-CM

## 2017-11-30 DIAGNOSIS — S82.209A TIBIA FRACTURE: ICD-10-CM

## 2017-11-30 DIAGNOSIS — M25.569 KNEE PAIN: ICD-10-CM

## 2017-11-30 DIAGNOSIS — H61.20 CERUMINOSIS: ICD-10-CM

## 2017-11-30 ASSESSMENT — MIFFLIN-ST. JEOR: SCORE: 863.04

## 2017-12-04 ENCOUNTER — RECORDS - HEALTHEAST (OUTPATIENT)
Dept: ADMINISTRATIVE | Facility: OTHER | Age: 77
End: 2017-12-04

## 2017-12-07 ENCOUNTER — COMMUNICATION - HEALTHEAST (OUTPATIENT)
Dept: OTHER | Facility: CLINIC | Age: 77
End: 2017-12-07

## 2017-12-08 ENCOUNTER — OFFICE VISIT - HEALTHEAST (OUTPATIENT)
Dept: NURSING | Facility: CLINIC | Age: 77
End: 2017-12-08

## 2017-12-08 DIAGNOSIS — M25.562 ACUTE PAIN OF LEFT KNEE: ICD-10-CM

## 2017-12-08 DIAGNOSIS — Z79.4 TYPE 2 DIABETES MELLITUS WITH RETINOPATHY, WITH LONG-TERM CURRENT USE OF INSULIN, MACULAR EDEMA PRESENCE UNSPECIFIED, UNSPECIFIED LATERALITY, UNSPECIFIED RETINOPATHY SEVERITY (H): ICD-10-CM

## 2017-12-08 DIAGNOSIS — E11.39 TYPE 2 DIABETES MELLITUS WITH OPHTHALMIC MANIFESTATIONS (H): ICD-10-CM

## 2017-12-08 DIAGNOSIS — E78.2 MIXED HYPERLIPIDEMIA: ICD-10-CM

## 2017-12-08 DIAGNOSIS — E11.319 TYPE 2 DIABETES MELLITUS WITH RETINOPATHY, WITH LONG-TERM CURRENT USE OF INSULIN, MACULAR EDEMA PRESENCE UNSPECIFIED, UNSPECIFIED LATERALITY, UNSPECIFIED RETINOPATHY SEVERITY (H): ICD-10-CM

## 2017-12-18 ENCOUNTER — COMMUNICATION - HEALTHEAST (OUTPATIENT)
Dept: PHYSICAL MEDICINE AND REHAB | Facility: CLINIC | Age: 77
End: 2017-12-18

## 2017-12-18 DIAGNOSIS — G89.29 CHRONIC BILATERAL LOW BACK PAIN WITHOUT SCIATICA: ICD-10-CM

## 2017-12-18 DIAGNOSIS — M54.50 CHRONIC BILATERAL LOW BACK PAIN WITHOUT SCIATICA: ICD-10-CM

## 2017-12-21 ENCOUNTER — COMMUNICATION - HEALTHEAST (OUTPATIENT)
Dept: FAMILY MEDICINE | Facility: CLINIC | Age: 77
End: 2017-12-21

## 2018-01-01 ENCOUNTER — OFFICE VISIT (OUTPATIENT)
Dept: AUDIOLOGY | Facility: CLINIC | Age: 78
End: 2018-01-01
Payer: COMMERCIAL

## 2018-01-01 ENCOUNTER — OFFICE VISIT - HEALTHEAST (OUTPATIENT)
Dept: FAMILY MEDICINE | Facility: CLINIC | Age: 78
End: 2018-01-01

## 2018-01-01 ENCOUNTER — COMMUNICATION - HEALTHEAST (OUTPATIENT)
Dept: CARDIOLOGY | Facility: CLINIC | Age: 78
End: 2018-01-01

## 2018-01-01 ENCOUNTER — AMBULATORY - HEALTHEAST (OUTPATIENT)
Dept: FAMILY MEDICINE | Facility: CLINIC | Age: 78
End: 2018-01-01

## 2018-01-01 ENCOUNTER — COMMUNICATION - HEALTHEAST (OUTPATIENT)
Dept: FAMILY MEDICINE | Facility: CLINIC | Age: 78
End: 2018-01-01

## 2018-01-01 ENCOUNTER — AMBULATORY - HEALTHEAST (OUTPATIENT)
Dept: LAB | Facility: CLINIC | Age: 78
End: 2018-01-01

## 2018-01-01 ENCOUNTER — OFFICE VISIT - HEALTHEAST (OUTPATIENT)
Dept: PHARMACY | Facility: CLINIC | Age: 78
End: 2018-01-01

## 2018-01-01 ENCOUNTER — COMMUNICATION - HEALTHEAST (OUTPATIENT)
Dept: NURSING | Facility: CLINIC | Age: 78
End: 2018-01-01

## 2018-01-01 ENCOUNTER — OFFICE VISIT (OUTPATIENT)
Dept: AUDIOLOGY | Facility: CLINIC | Age: 78
End: 2018-01-01
Attending: OTOLARYNGOLOGY
Payer: COMMERCIAL

## 2018-01-01 ENCOUNTER — RECORDS - HEALTHEAST (OUTPATIENT)
Dept: ADMINISTRATIVE | Facility: OTHER | Age: 78
End: 2018-01-01

## 2018-01-01 ENCOUNTER — OFFICE VISIT - HEALTHEAST (OUTPATIENT)
Dept: NURSING | Facility: CLINIC | Age: 78
End: 2018-01-01

## 2018-01-01 DIAGNOSIS — I25.10 CORONARY ATHEROSCLEROSIS: ICD-10-CM

## 2018-01-01 DIAGNOSIS — Z79.4 TYPE 2 DIABETES MELLITUS WITH RETINOPATHY, WITH LONG-TERM CURRENT USE OF INSULIN, MACULAR EDEMA PRESENCE UNSPECIFIED, UNSPECIFIED LATERALITY, UNSPECIFIED RETINOPATHY SEVERITY (H): ICD-10-CM

## 2018-01-01 DIAGNOSIS — E87.5 HYPERKALEMIA: ICD-10-CM

## 2018-01-01 DIAGNOSIS — N18.30 CKD (CHRONIC KIDNEY DISEASE) STAGE 3, GFR 30-59 ML/MIN (H): ICD-10-CM

## 2018-01-01 DIAGNOSIS — I25.5 ISCHEMIC CARDIOMYOPATHY: ICD-10-CM

## 2018-01-01 DIAGNOSIS — G89.29 CHRONIC BILATERAL LOW BACK PAIN WITH LEFT-SIDED SCIATICA: ICD-10-CM

## 2018-01-01 DIAGNOSIS — K21.9 GASTROESOPHAGEAL REFLUX DISEASE, ESOPHAGITIS PRESENCE NOT SPECIFIED: ICD-10-CM

## 2018-01-01 DIAGNOSIS — I44.7 LBBB (LEFT BUNDLE BRANCH BLOCK): ICD-10-CM

## 2018-01-01 DIAGNOSIS — R05.9 COUGH: ICD-10-CM

## 2018-01-01 DIAGNOSIS — E11.319 TYPE 2 DIABETES MELLITUS WITH RETINOPATHY, WITH LONG-TERM CURRENT USE OF INSULIN, MACULAR EDEMA PRESENCE UNSPECIFIED, UNSPECIFIED LATERALITY, UNSPECIFIED RETINOPATHY SEVERITY (H): ICD-10-CM

## 2018-01-01 DIAGNOSIS — I50.9 CHF (CONGESTIVE HEART FAILURE) (H): ICD-10-CM

## 2018-01-01 DIAGNOSIS — R09.82 POSTNASAL DRIP: ICD-10-CM

## 2018-01-01 DIAGNOSIS — J38.6 STENOSIS OF LARYNX: ICD-10-CM

## 2018-01-01 DIAGNOSIS — I25.10 CORONARY ARTERY DISEASE INVOLVING NATIVE CORONARY ARTERY OF NATIVE HEART WITHOUT ANGINA PECTORIS: ICD-10-CM

## 2018-01-01 DIAGNOSIS — D64.9 ANEMIA, UNSPECIFIED TYPE: ICD-10-CM

## 2018-01-01 DIAGNOSIS — Z91.09 ENVIRONMENTAL ALLERGIES: ICD-10-CM

## 2018-01-01 DIAGNOSIS — R63.0 DECREASED APPETITE: ICD-10-CM

## 2018-01-01 DIAGNOSIS — D64.9 ANEMIA: ICD-10-CM

## 2018-01-01 DIAGNOSIS — R06.00 DYSPNEA: ICD-10-CM

## 2018-01-01 DIAGNOSIS — D50.9 IRON DEFICIENCY ANEMIA, UNSPECIFIED IRON DEFICIENCY ANEMIA TYPE: ICD-10-CM

## 2018-01-01 DIAGNOSIS — H90.6 MIXED CONDUCTIVE AND SENSORINEURAL HEARING LOSS OF BOTH EARS: Primary | ICD-10-CM

## 2018-01-01 DIAGNOSIS — E11.39 TYPE 2 DIABETES MELLITUS WITH OPHTHALMIC MANIFESTATIONS (H): ICD-10-CM

## 2018-01-01 DIAGNOSIS — R53.1 WEAKNESS: ICD-10-CM

## 2018-01-01 DIAGNOSIS — I25.119 ATHEROSCLEROSIS OF CORONARY ARTERY OF NATIVE HEART WITH ANGINA PECTORIS, UNSPECIFIED VESSEL OR LESION TYPE (H): ICD-10-CM

## 2018-01-01 DIAGNOSIS — I50.23 ACUTE ON CHRONIC SYSTOLIC CONGESTIVE HEART FAILURE (H): ICD-10-CM

## 2018-01-01 DIAGNOSIS — E11.69 TYPE 2 DIABETES MELLITUS WITH OTHER SPECIFIED COMPLICATION (H): ICD-10-CM

## 2018-01-01 DIAGNOSIS — E78.2 MIXED HYPERLIPIDEMIA: ICD-10-CM

## 2018-01-01 DIAGNOSIS — R06.02 SHORTNESS OF BREATH: ICD-10-CM

## 2018-01-01 DIAGNOSIS — J44.9 COPD (CHRONIC OBSTRUCTIVE PULMONARY DISEASE) (H): ICD-10-CM

## 2018-01-01 DIAGNOSIS — H25.9 AGE-RELATED CATARACT OF BOTH EYES, UNSPECIFIED AGE-RELATED CATARACT TYPE: ICD-10-CM

## 2018-01-01 DIAGNOSIS — M54.42 CHRONIC BILATERAL LOW BACK PAIN WITH LEFT-SIDED SCIATICA: ICD-10-CM

## 2018-01-01 DIAGNOSIS — I50.9 HEART FAILURE (H): ICD-10-CM

## 2018-01-01 DIAGNOSIS — H90.6 MIXED HEARING LOSS, BILATERAL: Primary | ICD-10-CM

## 2018-01-01 DIAGNOSIS — I21.9 ACUTE MYOCARDIAL INFARCTION (H): ICD-10-CM

## 2018-01-01 DIAGNOSIS — R09.89 CARDIAC LV EJECTION FRACTION 10-20%: ICD-10-CM

## 2018-01-01 DIAGNOSIS — E11.69 TYPE 2 DIABETES MELLITUS WITH OTHER SPECIFIED COMPLICATION, UNSPECIFIED LONG TERM INSULIN USE STATUS: ICD-10-CM

## 2018-01-01 DIAGNOSIS — I70.91 GENERALIZED ATHEROSCLEROSIS: ICD-10-CM

## 2018-01-01 DIAGNOSIS — Z12.11 SCREEN FOR COLON CANCER: ICD-10-CM

## 2018-01-01 DIAGNOSIS — I25.10 CAD (CORONARY ARTERY DISEASE): ICD-10-CM

## 2018-01-01 LAB
ALBUMIN SERPL-MCNC: 3.2 G/DL (ref 3.5–5)
ALBUMIN SERPL-MCNC: 3.4 G/DL (ref 3.5–5)
ALP SERPL-CCNC: 56 U/L (ref 45–120)
ALP SERPL-CCNC: 86 U/L (ref 45–120)
ALT SERPL W P-5'-P-CCNC: 14 U/L (ref 0–45)
ALT SERPL W P-5'-P-CCNC: 22 U/L (ref 0–45)
ANION GAP SERPL CALCULATED.3IONS-SCNC: 10 MMOL/L (ref 5–18)
ANION GAP SERPL CALCULATED.3IONS-SCNC: 11 MMOL/L (ref 5–18)
ANION GAP SERPL CALCULATED.3IONS-SCNC: 14 MMOL/L (ref 5–18)
ANION GAP SERPL CALCULATED.3IONS-SCNC: 6 MMOL/L (ref 5–18)
ANION GAP SERPL CALCULATED.3IONS-SCNC: 7 MMOL/L (ref 5–18)
AST SERPL W P-5'-P-CCNC: 24 U/L (ref 0–40)
AST SERPL W P-5'-P-CCNC: 32 U/L (ref 0–40)
ATRIAL RATE - MUSE: 67 BPM
BASOPHILS # BLD AUTO: 0 THOU/UL (ref 0–0.2)
BASOPHILS NFR BLD AUTO: 1 % (ref 0–2)
BILIRUB SERPL-MCNC: 0.5 MG/DL (ref 0–1)
BILIRUB SERPL-MCNC: 0.8 MG/DL (ref 0–1)
BNP SERPL-MCNC: 4464 PG/ML (ref 0–137)
BNP SERPL-MCNC: 936 PG/ML (ref 0–137)
BUN SERPL-MCNC: 22 MG/DL (ref 8–28)
BUN SERPL-MCNC: 27 MG/DL (ref 8–28)
BUN SERPL-MCNC: 33 MG/DL (ref 8–28)
BUN SERPL-MCNC: 51 MG/DL (ref 8–28)
BUN SERPL-MCNC: 58 MG/DL (ref 8–28)
CALCIUM SERPL-MCNC: 8.7 MG/DL (ref 8.5–10.5)
CALCIUM SERPL-MCNC: 9.4 MG/DL (ref 8.5–10.5)
CALCIUM SERPL-MCNC: 9.5 MG/DL (ref 8.5–10.5)
CALCIUM SERPL-MCNC: 9.6 MG/DL (ref 8.5–10.5)
CALCIUM SERPL-MCNC: 9.8 MG/DL (ref 8.5–10.5)
CHLORIDE BLD-SCNC: 105 MMOL/L (ref 98–107)
CHLORIDE BLD-SCNC: 105 MMOL/L (ref 98–107)
CHLORIDE BLD-SCNC: 107 MMOL/L (ref 98–107)
CHLORIDE BLD-SCNC: 108 MMOL/L (ref 98–107)
CHLORIDE BLD-SCNC: 108 MMOL/L (ref 98–107)
CO2 SERPL-SCNC: 17 MMOL/L (ref 22–31)
CO2 SERPL-SCNC: 23 MMOL/L (ref 22–31)
CO2 SERPL-SCNC: 24 MMOL/L (ref 22–31)
CO2 SERPL-SCNC: 24 MMOL/L (ref 22–31)
CO2 SERPL-SCNC: 25 MMOL/L (ref 22–31)
CREAT SERPL-MCNC: 1.09 MG/DL (ref 0.6–1.1)
CREAT SERPL-MCNC: 1.58 MG/DL (ref 0.6–1.1)
CREAT SERPL-MCNC: 1.6 MG/DL (ref 0.6–1.1)
CREAT SERPL-MCNC: 1.63 MG/DL (ref 0.6–1.1)
CREAT SERPL-MCNC: 1.73 MG/DL (ref 0.6–1.1)
CREAT UR-MCNC: 79.7 MG/DL
DIASTOLIC BLOOD PRESSURE - MUSE: NORMAL MMHG
EOSINOPHIL # BLD AUTO: 0.1 THOU/UL (ref 0–0.4)
EOSINOPHIL # BLD AUTO: 0.2 THOU/UL (ref 0–0.4)
EOSINOPHIL # BLD AUTO: 0.3 THOU/UL (ref 0–0.4)
EOSINOPHIL NFR BLD AUTO: 2 % (ref 0–6)
EOSINOPHIL NFR BLD AUTO: 4 % (ref 0–6)
EOSINOPHIL NFR BLD AUTO: 5 % (ref 0–6)
ERYTHROCYTE [DISTWIDTH] IN BLOOD BY AUTOMATED COUNT: 14.8 % (ref 11–14.5)
ERYTHROCYTE [DISTWIDTH] IN BLOOD BY AUTOMATED COUNT: 15.4 % (ref 11–14.5)
ERYTHROCYTE [DISTWIDTH] IN BLOOD BY AUTOMATED COUNT: 15.5 % (ref 11–14.5)
FERRITIN SERPL-MCNC: 125 NG/ML (ref 10–130)
FERRITIN SERPL-MCNC: 135 NG/ML (ref 10–130)
GFR SERPL CREATININE-BSD FRML MDRD: 29 ML/MIN/1.73M2
GFR SERPL CREATININE-BSD FRML MDRD: 31 ML/MIN/1.73M2
GFR SERPL CREATININE-BSD FRML MDRD: 31 ML/MIN/1.73M2
GFR SERPL CREATININE-BSD FRML MDRD: 32 ML/MIN/1.73M2
GFR SERPL CREATININE-BSD FRML MDRD: 49 ML/MIN/1.73M2
GLUCOSE BLD-MCNC: 100 MG/DL (ref 70–125)
GLUCOSE BLD-MCNC: 183 MG/DL (ref 70–125)
GLUCOSE BLD-MCNC: 202 MG/DL (ref 70–125)
GLUCOSE BLD-MCNC: 216 MG/DL (ref 70–125)
GLUCOSE BLD-MCNC: 43 MG/DL (ref 70–125)
HBA1C MFR BLD: 8.8 % (ref 3.5–6)
HBA1C MFR BLD: 9.2 % (ref 3.5–6)
HCT VFR BLD AUTO: 36 % (ref 35–47)
HCT VFR BLD AUTO: 40.9 % (ref 35–47)
HCT VFR BLD AUTO: 41.6 % (ref 35–47)
HGB BLD-MCNC: 11.7 G/DL (ref 12–16)
HGB BLD-MCNC: 13 G/DL (ref 12–16)
HGB BLD-MCNC: 13.5 G/DL (ref 12–16)
INTERPRETATION ECG - MUSE: NORMAL
IRON SATN MFR SERPL: 21 % (ref 20–50)
IRON SATN MFR SERPL: 31 % (ref 20–50)
IRON SERPL-MCNC: 75 UG/DL (ref 42–175)
IRON SERPL-MCNC: 95 UG/DL (ref 42–175)
LDLC SERPL CALC-MCNC: 83 MG/DL
LYMPHOCYTES # BLD AUTO: 1.3 THOU/UL (ref 0.8–4.4)
LYMPHOCYTES # BLD AUTO: 1.4 THOU/UL (ref 0.8–4.4)
LYMPHOCYTES # BLD AUTO: 2.4 THOU/UL (ref 0.8–4.4)
LYMPHOCYTES NFR BLD AUTO: 25 % (ref 20–40)
LYMPHOCYTES NFR BLD AUTO: 25 % (ref 20–40)
LYMPHOCYTES NFR BLD AUTO: 35 % (ref 20–40)
MAGNESIUM SERPL-MCNC: 1.9 MG/DL (ref 1.8–2.6)
MAGNESIUM SERPL-MCNC: 2.2 MG/DL (ref 1.8–2.6)
MAGNESIUM SERPL-MCNC: 2.3 MG/DL (ref 1.8–2.6)
MCH RBC QN AUTO: 26.4 PG (ref 27–34)
MCH RBC QN AUTO: 26.9 PG (ref 27–34)
MCH RBC QN AUTO: 27.6 PG (ref 27–34)
MCHC RBC AUTO-ENTMCNC: 31.8 G/DL (ref 32–36)
MCHC RBC AUTO-ENTMCNC: 32.3 G/DL (ref 32–36)
MCHC RBC AUTO-ENTMCNC: 32.5 G/DL (ref 32–36)
MCV RBC AUTO: 83 FL (ref 80–100)
MCV RBC AUTO: 83 FL (ref 80–100)
MCV RBC AUTO: 85 FL (ref 80–100)
MICROALBUMIN UR-MCNC: 36.34 MG/DL (ref 0–1.99)
MICROALBUMIN/CREAT UR: 456 MG/G
MONOCYTES # BLD AUTO: 0.5 THOU/UL (ref 0–0.9)
MONOCYTES # BLD AUTO: 0.6 THOU/UL (ref 0–0.9)
MONOCYTES # BLD AUTO: 0.7 THOU/UL (ref 0–0.9)
MONOCYTES NFR BLD AUTO: 14 % (ref 2–10)
MONOCYTES NFR BLD AUTO: 9 % (ref 2–10)
MONOCYTES NFR BLD AUTO: 9 % (ref 2–10)
NEUTROPHILS # BLD AUTO: 2.9 THOU/UL (ref 2–7.7)
NEUTROPHILS # BLD AUTO: 3.4 THOU/UL (ref 2–7.7)
NEUTROPHILS # BLD AUTO: 3.5 THOU/UL (ref 2–7.7)
NEUTROPHILS NFR BLD AUTO: 51 % (ref 50–70)
NEUTROPHILS NFR BLD AUTO: 57 % (ref 50–70)
NEUTROPHILS NFR BLD AUTO: 63 % (ref 50–70)
P AXIS - MUSE: 58 DEGREES
PLATELET # BLD AUTO: 196 THOU/UL (ref 140–440)
PLATELET # BLD AUTO: 201 THOU/UL (ref 140–440)
PLATELET # BLD AUTO: 228 THOU/UL (ref 140–440)
PMV BLD AUTO: 7.9 FL (ref 7–10)
PMV BLD AUTO: 9 FL (ref 7–10)
PMV BLD AUTO: 9.1 FL (ref 7–10)
POTASSIUM BLD-SCNC: 4.8 MMOL/L (ref 3.5–5)
POTASSIUM BLD-SCNC: 5.2 MMOL/L (ref 3.5–5)
POTASSIUM BLD-SCNC: 5.2 MMOL/L (ref 3.5–5)
POTASSIUM BLD-SCNC: 5.5 MMOL/L (ref 3.5–5)
POTASSIUM BLD-SCNC: 5.6 MMOL/L (ref 3.5–5)
PR INTERVAL - MUSE: 176 MS
PROT SERPL-MCNC: 6.9 G/DL (ref 6–8)
PROT SERPL-MCNC: 7 G/DL (ref 6–8)
QRS DURATION - MUSE: 148 MS
QT - MUSE: 496 MS
QTC - MUSE: 524 MS
R AXIS - MUSE: 34 DEGREES
RBC # BLD AUTO: 4.35 MILL/UL (ref 3.8–5.4)
RBC # BLD AUTO: 4.88 MILL/UL (ref 3.8–5.4)
RBC # BLD AUTO: 4.92 MILL/UL (ref 3.8–5.4)
SODIUM SERPL-SCNC: 136 MMOL/L (ref 136–145)
SODIUM SERPL-SCNC: 136 MMOL/L (ref 136–145)
SODIUM SERPL-SCNC: 138 MMOL/L (ref 136–145)
SODIUM SERPL-SCNC: 140 MMOL/L (ref 136–145)
SODIUM SERPL-SCNC: 144 MMOL/L (ref 136–145)
SYSTOLIC BLOOD PRESSURE - MUSE: NORMAL MMHG
T AXIS - MUSE: 160 DEGREES
TIBC SERPL-MCNC: 306 UG/DL (ref 313–563)
TIBC SERPL-MCNC: 353 UG/DL (ref 313–563)
TRANSFERRIN SERPL-MCNC: 245 MG/DL (ref 212–360)
TRANSFERRIN SERPL-MCNC: 282 MG/DL (ref 212–360)
TROPONIN I SERPL-MCNC: 0.2 NG/ML (ref 0–0.29)
TSH SERPL DL<=0.005 MIU/L-ACNC: 0.94 UIU/ML (ref 0.3–5)
VENTRICULAR RATE- MUSE: 67 BPM
WBC: 5.1 THOU/UL (ref 4–11)
WBC: 5.4 THOU/UL (ref 4–11)
WBC: 6.8 THOU/UL (ref 4–11)

## 2018-01-01 ASSESSMENT — MIFFLIN-ST. JEOR
SCORE: 823.35
SCORE: 827.88
SCORE: 863.04
SCORE: 818.81
SCORE: 823.35
SCORE: 814.28
SCORE: 823.35

## 2018-01-04 ENCOUNTER — COMMUNICATION - HEALTHEAST (OUTPATIENT)
Dept: FAMILY MEDICINE | Facility: CLINIC | Age: 78
End: 2018-01-04

## 2018-01-04 DIAGNOSIS — K21.9 GASTROESOPHAGEAL REFLUX DISEASE, ESOPHAGITIS PRESENCE NOT SPECIFIED: ICD-10-CM

## 2018-01-04 DIAGNOSIS — I25.10 CORONARY ATHEROSCLEROSIS: ICD-10-CM

## 2018-01-18 NOTE — TELEPHONE ENCOUNTER
APPT INFO    Date /Time: 1/26/18 at 11:15AM   Reason for Appt: Needs new aids   Ref Provider/Clinic: Self   Are there internal records? Yes/No?  IF YES, list clinic names: [facility] - last seen 2015   Are there outside records? Yes/No? No   Patient Contact (Y/N) & Call Details: No   Action: Chart reviewed

## 2018-01-26 ENCOUNTER — PRE VISIT (OUTPATIENT)
Dept: OTOLARYNGOLOGY | Facility: CLINIC | Age: 78
End: 2018-01-26

## 2018-01-26 ENCOUNTER — OFFICE VISIT (OUTPATIENT)
Dept: OTOLARYNGOLOGY | Facility: CLINIC | Age: 78
End: 2018-01-26
Payer: COMMERCIAL

## 2018-01-26 ENCOUNTER — OFFICE VISIT (OUTPATIENT)
Dept: AUDIOLOGY | Facility: CLINIC | Age: 78
End: 2018-01-26
Attending: OTOLARYNGOLOGY
Payer: COMMERCIAL

## 2018-01-26 DIAGNOSIS — H90.3 SENSORINEURAL HEARING LOSS (SNHL) OF BOTH EARS: Primary | ICD-10-CM

## 2018-01-26 DIAGNOSIS — H61.23 BILATERAL IMPACTED CERUMEN: ICD-10-CM

## 2018-01-26 DIAGNOSIS — H90.6 MIXED HEARING LOSS, BILATERAL: Primary | ICD-10-CM

## 2018-01-26 DIAGNOSIS — H90.8 MIXED HEARING LOSS: Primary | ICD-10-CM

## 2018-01-26 PROBLEM — I63.50 CEREBRAL ARTERY OCCLUSION WITH CEREBRAL INFARCTION (H): Status: ACTIVE | Noted: 2018-01-26

## 2018-01-26 PROBLEM — J43.8 OTHER EMPHYSEMA (H): Status: ACTIVE | Noted: 2018-01-26

## 2018-01-26 PROBLEM — R01.1 UNDIAGNOSED CARDIAC MURMURS: Status: ACTIVE | Noted: 2018-01-26

## 2018-01-26 PROBLEM — K80.20 CALCULUS OF GALLBLADDER WITHOUT CHOLECYSTITIS: Status: ACTIVE | Noted: 2017-02-08

## 2018-01-26 PROBLEM — I70.91 GENERALIZED ATHEROSCLEROSIS: Status: ACTIVE | Noted: 2018-01-26

## 2018-01-26 PROBLEM — D64.9 ANEMIA: Status: ACTIVE | Noted: 2018-01-26

## 2018-01-26 PROBLEM — H50.05 ALTERNATING ESOTROPIA: Status: ACTIVE | Noted: 2018-01-26

## 2018-01-26 PROBLEM — R09.89 BRUIT OF RIGHT CAROTID ARTERY: Status: ACTIVE | Noted: 2017-01-03

## 2018-01-26 ASSESSMENT — PAIN SCALES - GENERAL: PAINLEVEL: NO PAIN (0)

## 2018-01-26 NOTE — PATIENT INSTRUCTIONS
The patient presents with a history of cerumen impaction and sensorineural hearing loss. The patient's ears are cleaned today. Based upon her Audiogram and Tympanogram, she will be referred for a hearing aid consultation.

## 2018-01-26 NOTE — MR AVS SNAPSHOT
After Visit Summary   2018    Nav Laurent    MRN: [id number]           Patient Information     Date Of Birth          1940        Visit Information        Provider Department      2018 9:45 AM Rossana Puente; Afia Sy, [facility] Audiology        Today's Diagnoses     Mixed hearing loss, bilateral    -  1       Follow-ups after your visit        Who to contact     Please call your clinic at [phone] to:    Ask questions about your health    Make or cancel appointments    Discuss your medicines    Learn about your test results    Speak to your doctor   If you have compliments or concerns about an experience at your clinic, or if you wish to file a complaint, please contact [facility] Patient Relations at [phone] or email us at [email or web address]         Additional Information About Your Visit        MyChart Information     [employer] is an electronic gateway that provides easy, online access to your medical records. With [employer]akan, you can request a clinic appointment, read your test results, renew a prescription or communicate with your care team.     To sign up for [employer] visit the website at [email or web address]   You will be asked to enter the access code listed below, as well as some personal information. Please follow the directions to create your username and password.     Your access code is: [email or web address]  Expires: 2018  6:30 AM     Your access code will  in 90 days. If you need help or a new code, please contact your [facility] or call [phone] for assistance.        Care EveryWhere ID     This is your Care EveryWhere ID. This could be used by other organizations to access your [facility] medical records  [id number]         Blood Pressure from Last 3 Encounters:   13 156/80   13 111/73   13 132/67    Weight from Last 3 Encounters:   13 51.1 kg (112 lb  9.6 oz)   08/30/13 49 kg (108 lb)   08/28/13 49.9 kg (110 lb)              We Performed the Following     [facility] Audiometry Thrshld Eval & Speech Recog (76918)     Tymps / Reflex   (92018)        Primary Care Provider    None Specified       No primary provider on file.        Equal Access to Services     [facility]: Hadii aad ku hadasho Soomaali, waaxda luqadaha, qaybta kaalmada adeegyada, waxrosalina bridgerin selenan ademary arteaga iman . So [facility] [phone].    ATENCIÓN: Si habla español, tiene a wheeler disposición servicios gratuitos de asistencia lingüística. Marciname al [phone].    We comply with applicable federal civil rights laws and [place] laws. We do not discriminate on the basis of race, color, national origin, age, disability, sex, sexual orientation, or gender identity.            Thank you!     Thank you for choosing [facility] AUDIOLOGY  for your care. Our goal is always to provide you with excellent care. Hearing back from our patients is one way we can continue to improve our services. Please take a few minutes to complete the written survey that you may receive in the mail after your visit with us. Thank you!             Your Updated Medication List - Protect others around you: Learn how to safely use, store and throw away your medicines at www.disposemymeds.org.          This list is accurate as of 1/26/18 11:36 AM.  Always use your most recent med list.                   Brand Name Dispense Instructions for use Diagnosis    albuterol (2.5 MG/3ML) 0.083% neb solution     360 mL    Take 3 mLs by nebulization every 4 hours as needed.    Dyspnea       aspirin 81 MG tablet     30 tablet    Take 1 tablet (81 mg) by mouth daily    Diabetes mellitus type 2, uncontrolled (H)       blood glucose monitoring test strip    MIRLANDE CONTOUR    200 strip    Test 4 times daily as directed.    Type II or unspecified type diabetes mellitus without mention of complication, uncontrolled       budesonide 0.5 MG/2ML neb  solution    PULMICORT    180 mL    Take 2 mLs (0.5 mg) by nebulization daily    Left heart failure (H)       calcium-vitamin D 600-400 MG-UNIT per tablet    calcium 600/vitamin D    180 tablet    Take 1 tablet by mouth 2 times daily    Hypovitaminosis D       furosemide 40 MG tablet    LASIX    30 tablet    Take 1 tablet (40 mg) by mouth daily    Edema       insulin glargine 100 UNIT/ML injection    LANTUS SOLOSTAR    3 Month    Inject 16 Units Subcutaneous At Bedtime    Diabetes mellitus, type 2 (H)       insulin pen needle 31G X 8 MM    1ST TIER UNIFINE PENTIPS    100 each    Use as needed for insulin administration    Diabetes mellitus type 2, uncontrolled (H)       losartan 50 MG tablet    COZAAR    30 tablet    Take 1 tablet (50 mg) by mouth daily    CHF (congestive heart failure) (H)       nitroGLYcerin 0.4 MG sublingual tablet    NITROSTAT    25 tablet    Place 1 tablet (0.4 mg) under the tongue every 5 minutes as needed    Chronic ischemic heart disease, unspecified       NovoLOG FLEXpen 100 UNITS/ML injection   Generic drug:  insulin aspart     3 Month    Inject 8 Units Subcutaneous 3 times daily (before meals) 8 units before breakfast, 8 units before lunch, 8 units before dinner    Vitamin D insufficiency       ranitidine 300 MG tablet    ZANTAC    30 tablet    Take 1 tablet (300 mg) by mouth At Bedtime    Esophageal reflux       simvastatin 40 MG tablet    ZOCOR    30 tablet    Take 0.5 tablets (20 mg) by mouth At Bedtime    Hyperlipidemia LDL goal <100       SURE COMFORT ALCOHOL PREP 70 % Pads     200 each    Use prior to testing your blood glucose 4 times daily.    Diabetes mellitus type 2, uncontrolled (H)       vitamin D 2000 UNITS tablet     90 tablet    Take 1 tablet by mouth daily    CKD (chronic kidney disease) stage 3, GFR 30-59 ml/min, Hypovitaminosis D

## 2018-01-26 NOTE — PROGRESS NOTES
The patient presents with a history of cerumen impaction in the ears and sensorineural hearing loss.  The patient does not have difficulty with infections of the ears.  The patient denies sinusitis, rhinitis, facial pain, nasal obstruction or purulent nasal discharge. The patient denies chronic or recurrent tonsillitis, chronic or recurrent pharyngitis. The patient denies otalgia, otorrhea, eustachian tube dysfunction, ear infections, dizziness or tinnitus. Her Audiogram and Tympanogram are reviewed with her and they demonstrates severe bilateral sensorineural hearing loss with 72% word recognition in the right ear and 88% word recognition in the left ear. The tympanograms are normal.     This patient is seen in consultation as a self referral to my clinic.     All other systems were reviewed and they are either negative or they are not directly pertinent to this Otolaryngology examination.    Past Medical History:    Past Medical History:   Diagnosis Date     Congestive heart failure, unspecified      Coronary artery disease      Diabetes mellitus (H)      Hyperlipidemia LDL goal <70      Hypertension      Ischemic cardiomyopathy     EF 25-30%, LV 5 cm (2/2013)     MI (myocardial infarction) 3-2009     Stented coronary artery 3-2009    BMS to proximal/mid LAD at [facility]     Stented coronary artery 7-2009    severe ostial D1 stenosis, LCx with 50% diffuse disease in large OM, small RCA with 100% proximal PDA occlusion; intervention of PTCA to LAD stent, D1 side branch, PTCA of PDA and BRAYDEN at bifurcation.       Past Surgical History:    Past Surgical History:   Procedure Laterality Date     ABDOMEN SURGERY      tumor removed from stomach     BRONCHOSCOPY RIGID  5/5/2011    Procedure:BRONCHOSCOPY RIGID; with dilation; Surgeon:GERARD SUAREZ; [facility]     cardiac stents      3 yrs ago     CARDIAC SURGERY      stents     LARYNGOSCOPY  5/5/2011    Procedure:LARYNGOSCOPY; Direct; Surgeon:GERARD SUAREZ; Location:[facility]      TRACHEOSTOMY      Closed now       Medications:      Current Outpatient Prescriptions:      calcium-vitamin D (CALCIUM 600/VITAMIN D) 600-400 MG-UNIT per tablet, Take 1 tablet by mouth 2 times daily, Disp: 180 tablet, Rfl: 3     furosemide (LASIX) 40 MG tablet, Take 1 tablet (40 mg) by mouth daily, Disp: 30 tablet, Rfl: 0     Cholecalciferol (VITAMIN D) 2000 UNITS tablet, Take 1 tablet by mouth daily, Disp: 90 tablet, Rfl: 0     aspirin 81 MG tablet, Take 1 tablet (81 mg) by mouth daily, Disp: 30 tablet, Rfl: 5     ranitidine (ZANTAC) 300 MG tablet, Take 1 tablet (300 mg) by mouth At Bedtime, Disp: 30 tablet, Rfl: 5     Alcohol Swabs (SURE COMFORT ALCOHOL PREP) 70 % PADS, Use prior to testing your blood glucose 4 times daily., Disp: 200 each, Rfl: 2     budesonide (PULMICORT) 0.5 MG/2ML nebulizer solution, Take 2 mLs (0.5 mg) by nebulization daily, Disp: 180 mL, Rfl: 3     glucose blood VI test strips (MIRLANDE CONTOUR TEST) strip, Test 4 times daily as directed., Disp: 200 strip, Rfl: 3     insulin glargine (LANTUS SOLOSTAR) SOLN 100 UNIT/ML, Inject 16 Units Subcutaneous At Bedtime, Disp: 3 Month, Rfl: 1     insulin pen needle (1ST TIER UNIFINE PENTIPS) 31G X 8 MM MISC, Use as needed for insulin administration, Disp: 100 each, Rfl: 2     losartan (COZAAR) 50 MG tablet, Take 1 tablet (50 mg) by mouth daily, Disp: 30 tablet, Rfl: 3     nitroglycerin (NITROSTAT) 0.4 MG SL tablet, Place 1 tablet (0.4 mg) under the tongue every 5 minutes as needed, Disp: 25 tablet, Rfl: 10     NOVOLOG FLEXPEN SOLN 100 UNIT/ML, Inject 8 Units Subcutaneous 3 times daily (before meals) 8 units before breakfast, 8 units before lunch, 8 units before dinner, Disp: 3 Month, Rfl: 3     simvastatin (ZOCOR) 40 MG tablet, Take 0.5 tablets (20 mg) by mouth At Bedtime, Disp: 30 tablet, Rfl: 3     albuterol (2.5 MG/3ML) 0.083% nebulizer solution, Take 3 mLs by nebulization every 4 hours as needed., Disp: 360 mL, Rfl: 0    Allergies:    Review of  patient's allergies indicates no known allergies.    Physical Examination:    The patient is a well developed, well nourished female in no apparent distress.  She is normocephalic, atraumatic with pupils equally round and reactive to light.    Oral Cavity Examination:  Normal mucosa with no masses or lesions  Nasal Examination: Normal mucosa with no masses or lesions  Ear Examination: Ear canals impacted with cerumen bilaterally.  The ear canals are cleaned today using an alligator forceps, a currette and a suction using a binocular microscope for visualization. The tympanic membranes and middle ear spaces normal bilaterally.  Neurological Examination: Facial nerve function intact and symmetric  Integumentary Examination: No lesions on the skin of the head and neck  Neck Examination: No masses or lesions, no lymphadenopathy  Endocrine Examination: Normal thyroid examination    Assessment and Plan:    The patient presents with a history of cerumen impaction and sensorineural hearing loss. The patient's ears are cleaned today. Based upon her Audiogram and Tympanogram, she will be referred for a hearing aid consultation.

## 2018-01-26 NOTE — LETTER
1/26/2018       RE: Nav Laurent  [address] [place] [address]     Dear Colleague,    Thank you for referring your patient, Nav Laurent, to the [facility] EAR NOSE AND THROAT at [facility]. Please see a copy of my visit note below.    The patient presents with a history of cerumen impaction in the ears and sensorineural hearing loss.  The patient does not have difficulty with infections of the ears.  The patient denies sinusitis, rhinitis, facial pain, nasal obstruction or purulent nasal discharge. The patient denies chronic or recurrent tonsillitis, chronic or recurrent pharyngitis. The patient denies otalgia, otorrhea, eustachian tube dysfunction, ear infections, dizziness or tinnitus. Her Audiogram and Tympanogram are reviewed with her and they demonstrates severe bilateral sensorineural hearing loss with 72% word recognition in the right ear and 88% word recognition in the left ear. The tympanograms are normal.     This patient is seen in consultation as a self referral to my clinic.     All other systems were reviewed and they are either negative or they are not directly pertinent to this Otolaryngology examination.    Past Medical History:    Past Medical History:   Diagnosis Date     Congestive heart failure, unspecified      Coronary artery disease      Diabetes mellitus (H)      Hyperlipidemia LDL goal <70      Hypertension      Ischemic cardiomyopathy     EF 25-30%, LV 5 cm (2/2013)     MI (myocardial infarction) 3-2009     Stented coronary artery 3-2009    BMS to proximal/mid LAD at [facility]     Stented coronary artery 7-2009    severe ostial D1 stenosis, LCx with 50% diffuse disease in large OM, small RCA with 100% proximal PDA occlusion; intervention of PTCA to LAD stent, D1 side branch, PTCA of PDA and BRAYDEN at bifurcation.       Past Surgical History:    Past Surgical History:   Procedure Laterality Date     ABDOMEN SURGERY      tumor removed from stomach      BRONCHOSCOPY RIGID  5/5/2011    Procedure:BRONCHOSCOPY RIGID; with dilation; Surgeon:GERARD SUAREZ; [facility]     cardiac stents      3 yrs ago     CARDIAC SURGERY      stents     LARYNGOSCOPY  5/5/2011    Procedure:LARYNGOSCOPY; Direct; Surgeon:GERARD SUAREZ; [facility]     TRACHEOSTOMY      Closed now       Medications:      Current Outpatient Prescriptions:      calcium-vitamin D (CALCIUM 600/VITAMIN D) 600-400 MG-UNIT per tablet, Take 1 tablet by mouth 2 times daily, Disp: 180 tablet, Rfl: 3     furosemide (LASIX) 40 MG tablet, Take 1 tablet (40 mg) by mouth daily, Disp: 30 tablet, Rfl: 0     Cholecalciferol (VITAMIN D) 2000 UNITS tablet, Take 1 tablet by mouth daily, Disp: 90 tablet, Rfl: 0     aspirin 81 MG tablet, Take 1 tablet (81 mg) by mouth daily, Disp: 30 tablet, Rfl: 5     ranitidine (ZANTAC) 300 MG tablet, Take 1 tablet (300 mg) by mouth At Bedtime, Disp: 30 tablet, Rfl: 5     Alcohol Swabs (SURE COMFORT ALCOHOL PREP) 70 % PADS, Use prior to testing your blood glucose 4 times daily., Disp: 200 each, Rfl: 2     budesonide (PULMICORT) 0.5 MG/2ML nebulizer solution, Take 2 mLs (0.5 mg) by nebulization daily, Disp: 180 mL, Rfl: 3     glucose blood VI test strips (MIRLANDE CONTOUR TEST) strip, Test 4 times daily as directed., Disp: 200 strip, Rfl: 3     insulin glargine (LANTUS SOLOSTAR) SOLN 100 UNIT/ML, Inject 16 Units Subcutaneous At Bedtime, Disp: 3 Month, Rfl: 1     insulin pen needle (1ST TIER UNIFINE PENTIPS) 31G X 8 MM MISC, Use as needed for insulin administration, Disp: 100 each, Rfl: 2     losartan (COZAAR) 50 MG tablet, Take 1 tablet (50 mg) by mouth daily, Disp: 30 tablet, Rfl: 3     nitroglycerin (NITROSTAT) 0.4 MG SL tablet, Place 1 tablet (0.4 mg) under the tongue every 5 minutes as needed, Disp: 25 tablet, Rfl: 10     NOVOLOG FLEXPEN SOLN 100 UNIT/ML, Inject 8 Units Subcutaneous 3 times daily (before meals) 8 units before breakfast, 8 units before lunch, 8 units before dinner, Disp: 3  Month, Rfl: 3     simvastatin (ZOCOR) 40 MG tablet, Take 0.5 tablets (20 mg) by mouth At Bedtime, Disp: 30 tablet, Rfl: 3     albuterol (2.5 MG/3ML) 0.083% nebulizer solution, Take 3 mLs by nebulization every 4 hours as needed., Disp: 360 mL, Rfl: 0    Allergies:    Review of patient's allergies indicates no known allergies.    Physical Examination:    The patient is a well developed, well nourished female in no apparent distress.  She is normocephalic, atraumatic with pupils equally round and reactive to light.    Oral Cavity Examination:  Normal mucosa with no masses or lesions  Nasal Examination: Normal mucosa with no masses or lesions  Ear Examination: Ear canals impacted with cerumen bilaterally.  The ear canals are cleaned today using an alligator forceps, a currette and a suction using a binocular microscope for visualization. The tympanic membranes and middle ear spaces normal bilaterally.  Neurological Examination: Facial nerve function intact and symmetric  Integumentary Examination: No lesions on the skin of the head and neck  Neck Examination: No masses or lesions, no lymphadenopathy  Endocrine Examination: Normal thyroid examination    Assessment and Plan:    The patient presents with a history of cerumen impaction and sensorineural hearing loss. The patient's ears are cleaned today. Based upon her Audiogram and Tympanogram, she will be referred for a hearing aid consultation.     Again, thank you for allowing me to participate in the care of your patient.      Sincerely,    Jose Wheeler MD

## 2018-01-26 NOTE — PROGRESS NOTES
AUDIOLOGY REPORT    SUMMARY: Audiology visit completed. See audiogram for results.      RECOMMENDATIONS: Follow-up with ENT.    Nakul Banegas, [facility]  Licensed Audiologist  [place] License #[id number]

## 2018-01-26 NOTE — MR AVS SNAPSHOT
After Visit Summary   1/26/2018    Nav Laurent    MRN: [id number]           Patient Information     Date Of Birth          1940        Visit Information        Provider Department      1/26/2018 11:00 AM Terri Harvey; Jose Wheeler MD [facility] Ear Nose and Throat        Today's Diagnoses     Sensorineural hearing loss (SNHL) of both ears    -  1    Bilateral impacted cerumen          Care Instructions    The patient presents with a history of cerumen impaction and sensorineural hearing loss. The patient's ears are cleaned today. Based upon her Audiogram and Tympanogram, she will be referred for a hearing aid consultation.           Follow-ups after your visit        Additional Services     AUDIOLOGY ADULT REFERRAL       Your provider has referred you to: Rowena Chau    Treatment:  Evaluation & Treatment  Specialty Testing:  Hearing Aid Consultation-90 minutes please    Please be aware that coverage of these services is subject to the terms and limitations of your health insurance plan.  Call member services at your health plan with any benefit or coverage questions.      Please bring the following to your appointment:  >>   Any x-rays, CTs or MRIs which have been performed.  Contact the facility where they were done to arrange for  prior to your scheduled appointment.  Any new CT, MRI or other procedures ordered by your specialist must be performed at a [facility] facility or coordinated by your clinic's referral office.   >>   List of current medications  >>   This referral request   >>   Any documents/labs given to you for this referral                  Your next 10 appointments already scheduled     Feb 17, 2018 10:00 AM CST   Hearing Aid Consult with Jasiel [facility] Audiology ([facility])    [address] [place] [id number] [phone]            Mar 12, 2018 10:00 AM CDT   Hearing Aid Fitting with  Jasiel Anderson [facility] Audiology ([facility])    [address] [id number] [phone]            2018 10:30 AM CDT   (Arrive by 10:15 AM)   Initial Review Program with Jasiel Anderson [facility] Audiology ([facility])    [address] [id number] [phone]              Who to contact     Please call your clinic at [phone] to:    Ask questions about your health    Make or cancel appointments    Discuss your medicines    Learn about your test results    Speak to your doctor   If you have compliments or concerns about an experience at your clinic, or if you wish to file a complaint, please contact [facility] Patient Relations at [phone] or email us at [email or web address]         Additional Information About Your Visit        tocario Information     tocario is an electronic gateway that provides easy, online access to your medical records. With tocario, you can request a clinic appointment, read your test results, renew a prescription or communicate with your care team.     To sign up for tocario visit the website at [email or web address]   You will be asked to enter the access code listed below, as well as some personal information. Please follow the directions to create your username and password.     Your access code is: [email or web address]  Expires: 2018  6:30 AM     Your access code will  in 90 days. If you need help or a new code, please contact your [facility] or call [phone] for assistance.        Care EveryWhere ID     This is your Care EveryWhere ID. This could be used by other organizations to access your [place] medical records  [id number]         Blood Pressure from Last 3 Encounters:   13 156/80   13 111/73   13 132/67    Weight from  Last 3 Encounters:   11/18/13 51.1 kg (112 lb 9.6 oz)   08/30/13 49 kg (108 lb)   08/28/13 49.9 kg (110 lb)              We Performed the Following     AUDIO REVIEW/CONSULT     AUDIOLOGY ADULT REFERRAL     REMOVE IMPACTED CERUMEN        Primary Care Provider    None Specified       No primary provider on file.        Equal Access to Services     [facility] : Hadii aad ku hadlaurao Soolga lidiaali, waaxda luqadaha, qaybta kaalmada yurialannahusam, waxay idiin haymindimarco a alfonsojonoverna valerio . So [facility] [phone].    ATENCIÓN: Si habla español, tiene a wheeler disposición servicios gratuitos de asistencia lingüística. Llame al [phone].    We comply with applicable federal civil rights laws and [place] laws. We do not discriminate on the basis of race, color, national origin, age, disability, sex, sexual orientation, or gender identity.            Thank you!     Thank you for choosing [facility] EAR NOSE AND THROAT  for your care. Our goal is always to provide you with excellent care. Hearing back from our patients is one way we can continue to improve our services. Please take a few minutes to complete the written survey that you may receive in the mail after your visit with us. Thank you!             Your Updated Medication List - Protect others around you: Learn how to safely use, store and throw away your medicines at www.disposemymeds.org.          This list is accurate as of 1/26/18 12:03 PM.  Always use your most recent med list.                   Brand Name Dispense Instructions for use Diagnosis    albuterol (2.5 MG/3ML) 0.083% neb solution     360 mL    Take 3 mLs by nebulization every 4 hours as needed.    Dyspnea       aspirin 81 MG tablet     30 tablet    Take 1 tablet (81 mg) by mouth daily    Diabetes mellitus type 2, uncontrolled (H)       blood glucose monitoring test strip    MIRLANDE CONTOUR    200 strip    Test 4 times daily as directed.    Type II or unspecified type diabetes mellitus without mention of  complication, uncontrolled       budesonide 0.5 MG/2ML neb solution    PULMICORT    180 mL    Take 2 mLs (0.5 mg) by nebulization daily    Left heart failure (H)       calcium-vitamin D 600-400 MG-UNIT per tablet    calcium 600/vitamin D    180 tablet    Take 1 tablet by mouth 2 times daily    Hypovitaminosis D       furosemide 40 MG tablet    LASIX    30 tablet    Take 1 tablet (40 mg) by mouth daily    Edema       insulin glargine 100 UNIT/ML injection    LANTUS SOLOSTAR    3 Month    Inject 16 Units Subcutaneous At Bedtime    Diabetes mellitus, type 2 (H)       insulin pen needle 31G X 8 MM    1ST TIER UNIFINE PENTIPS    100 each    Use as needed for insulin administration    Diabetes mellitus type 2, uncontrolled (H)       losartan 50 MG tablet    COZAAR    30 tablet    Take 1 tablet (50 mg) by mouth daily    CHF (congestive heart failure) (H)       nitroGLYcerin 0.4 MG sublingual tablet    NITROSTAT    25 tablet    Place 1 tablet (0.4 mg) under the tongue every 5 minutes as needed    Chronic ischemic heart disease, unspecified       NovoLOG FLEXpen 100 UNITS/ML injection   Generic drug:  insulin aspart     3 Month    Inject 8 Units Subcutaneous 3 times daily (before meals) 8 units before breakfast, 8 units before lunch, 8 units before dinner    Vitamin D insufficiency       ranitidine 300 MG tablet    ZANTAC    30 tablet    Take 1 tablet (300 mg) by mouth At Bedtime    Esophageal reflux       simvastatin 40 MG tablet    ZOCOR    30 tablet    Take 0.5 tablets (20 mg) by mouth At Bedtime    Hyperlipidemia LDL goal <100       SURE COMFORT ALCOHOL PREP 70 % Pads     200 each    Use prior to testing your blood glucose 4 times daily.    Diabetes mellitus type 2, uncontrolled (H)       vitamin D 2000 UNITS tablet     90 tablet    Take 1 tablet by mouth daily    CKD (chronic kidney disease) stage 3, GFR 30-59 ml/min, Hypovitaminosis D

## 2018-01-26 NOTE — NURSING NOTE
Chief Complaint   Patient presents with     Consult     Clearance for new hearing Aids. Seen in audiology. Unable to obtain height and weight. Wheel chair bound.       Feliciano Rowland LPN

## 2018-01-28 ENCOUNTER — COMMUNICATION - HEALTHEAST (OUTPATIENT)
Dept: NURSING | Facility: CLINIC | Age: 78
End: 2018-01-28

## 2018-01-28 DIAGNOSIS — E11.69 TYPE 2 DIABETES MELLITUS WITH OTHER SPECIFIED COMPLICATION (H): ICD-10-CM

## 2018-02-17 NOTE — PROGRESS NOTES
AUDIOLOGY REPORT    SUBJECTIVE: Nav Laurent is a 77 year old female was seen in the Audiology Clinic at  [facility] on 2/17/18 to discuss concerns with hearing and functional communication difficulties. The patient was accompanied by their  and family member. Nav has been seen previously on 1/26/2018, and results revealed a profound rising to severe possibly mixed hearing loss bilaterally. The patient was medically evaluated and determined to be cleared for binaural hearing aids by Jose Wheeler MD. Nav notes difficulty with communication in a variety of listening situations. She is an experienced hearing aid user and currently wears a left Phonak Parris III UP hearing aid.    OBJECTIVE:  Patient is a hearing aid candidate. Patient would like to move forward with a hearing aid evaluation today. Therefore, the patient was presented with different options for amplification to help aid in communication. Discussed styles, levels of technology and monaural vs. binaural fitting. She reports that she would like to stay with the same  as she is happy with how durable her current hearing aid is.    The hearing aid(s) mutually chosen were:  Binaural: Phonak Parris V50-UP  COLOR: Beige  BATTERY SIZE: 675  EARMOLD/TIPS: full shell acrylic clear    Otoscopy revealed ears are clear of cerumen bilaterally. Bilateral earmolds were taken without incident.     The patient's left hearing aid was also cleaned.  The microphone covers and earmold tubing were changed.  A listening check revealed that the hearing aid sounded crisp and clear with no distortion or weakness noted. No programming changes were made.    ASSESSMENT:     Reviewed purchase information and warranty information with patient. The 45 day trial period was explained to patient. The patient was given a copy of the [place] Department of Health consumer brochure on purchasing hearing instruments. Patient risk  factors have been provided to the patient in writing prior to the sale of the hearing aid per FDA regulation. The risk factors are also available in the User Instructional Booklet to be presented on the day of the hearing aid fitting. Hearing aid(s) ordered. Hearing aid evaluation completed.    PLAN: Nav is scheduled to return in 2-3 weeks for a hearing aid fitting and programming. Purchase agreement will be completed on that date. A hearing aid check was completed and the earmold tubing and hearing aid microphone covers were changed. Please contact this clinic with any questions or concerns.      Afia Fitzpatrick, Jasiel  Audiologist  [place] License  #[id number]

## 2018-06-18 NOTE — MR AVS SNAPSHOT
After Visit Summary   2018    Nav Laurent    MRN: [id number]           Patient Information     Date Of Birth          1940        Visit Information        Provider Department      2018 10:00 AM Afia Fitzpatrick AuD; [facility] Audiology        Today's Diagnoses     Mixed hearing loss, bilateral    -  1       Follow-ups after your visit        Your next 10 appointments already scheduled     2018  9:30 AM CDT   (Arrive by 9:15 AM)   Initial Review Program with Jasiel Anderson   [facility] Audiology ([facility])    [address]  [address] [facility] [id number] [phone]              Who to contact     Please call your clinic at [phone] to:    Ask questions about your health    Make or cancel appointments    Discuss your medicines    Learn about your test results    Speak to your doctor            Additional Information About Your Visit        MyChart Information     Keniut is an electronic gateway that provides easy, online access to your medical records. With MDSave, you can request a clinic appointment, read your test results, renew a prescription or communicate with your care team.     To sign up for Keniut visit the website at [email or web address]   You will be asked to enter the access code listed below, as well as some personal information. Please follow the directions to create your username and password.     Your access code is: [email or web address]  Expires: 2018  6:30 AM     Your access code will  in 90 days. If you need help or a new code, please contact your [facility] or call [phone] for assistance.        Care EveryWhere ID     This is your Care EveryWhere ID. This could be used by other organizations to access your [place] medical records  [id number]         Blood Pressure from Last 3 Encounters:   13 156/80    08/30/13 111/73   08/28/13 132/67    Weight from Last 3 Encounters:   11/18/13 51.1 kg (112 lb 9.6 oz)   08/30/13 49 kg (108 lb)   08/28/13 49.9 kg (110 lb)              We Performed the Following     Hearing Aid Fitting        Primary Care Provider    None Specified       No primary provider on file.        Equal Access to Services     [facility]: Hadii aad ku hadlaurao Soolga lidiaali, waaxda luqadaha, qaybta kaalmada ademaryyada, waxay idiin haymindimarco a alfonsojonoverna valerio . So [facility] [phone].    ATENCIÓN: Si nareshla estephania, tiene a wheeler disposición servicios gratuitos de asistencia lingüística. Llame al [phone].    We comply with applicable federal civil rights laws and [place] laws. We do not discriminate on the basis of race, color, national origin, age, disability, sex, sexual orientation, or gender identity.            Thank you!     Thank you for choosing [facility] AUDIOLOGY  for your care. Our goal is always to provide you with excellent care. Hearing back from our patients is one way we can continue to improve our services. Please take a few minutes to complete the written survey that you may receive in the mail after your visit with us. Thank you!             Your Updated Medication List - Protect others around you: Learn how to safely use, store and throw away your medicines at www.disposemymeds.org.          This list is accurate as of 6/18/18  6:33 PM.  Always use your most recent med list.                   Brand Name Dispense Instructions for use Diagnosis    albuterol (2.5 MG/3ML) 0.083% neb solution     360 mL    Take 3 mLs by nebulization every 4 hours as needed.    Dyspnea       aspirin 81 MG tablet     30 tablet    Take 1 tablet (81 mg) by mouth daily    Diabetes mellitus type 2, uncontrolled (H)       blood glucose monitoring test strip    MIRLANDE CONTOUR    200 strip    Test 4 times daily as directed.    Type II or unspecified type diabetes mellitus without mention of complication, uncontrolled        budesonide 0.5 MG/2ML neb solution    PULMICORT    180 mL    Take 2 mLs (0.5 mg) by nebulization daily    Left heart failure (H)       calcium-vitamin D 600-400 MG-UNIT per tablet    calcium 600/vitamin D    180 tablet    Take 1 tablet by mouth 2 times daily    Hypovitaminosis D       furosemide 40 MG tablet    LASIX    30 tablet    Take 1 tablet (40 mg) by mouth daily    Edema       insulin glargine 100 UNIT/ML injection    LANTUS SOLOSTAR    3 Month    Inject 16 Units Subcutaneous At Bedtime    Diabetes mellitus, type 2 (H)       insulin pen needle 31G X 8 MM    1ST TIER UNIFINE PENTIPS    100 each    Use as needed for insulin administration    Diabetes mellitus type 2, uncontrolled (H)       losartan 50 MG tablet    COZAAR    30 tablet    Take 1 tablet (50 mg) by mouth daily    CHF (congestive heart failure) (H)       nitroGLYcerin 0.4 MG sublingual tablet    NITROSTAT    25 tablet    Place 1 tablet (0.4 mg) under the tongue every 5 minutes as needed    Chronic ischemic heart disease, unspecified       NovoLOG FLEXpen 100 UNITS/ML injection   Generic drug:  insulin aspart     3 Month    Inject 8 Units Subcutaneous 3 times daily (before meals) 8 units before breakfast, 8 units before lunch, 8 units before dinner    Vitamin D insufficiency       ranitidine 300 MG tablet    ZANTAC    30 tablet    Take 1 tablet (300 mg) by mouth At Bedtime    Esophageal reflux       simvastatin 40 MG tablet    ZOCOR    30 tablet    Take 0.5 tablets (20 mg) by mouth At Bedtime    Hyperlipidemia LDL goal <100       SURE COMFORT ALCOHOL PREP 70 % Pads     200 each    Use prior to testing your blood glucose 4 times daily.    Diabetes mellitus type 2, uncontrolled (H)       vitamin D 2000 units tablet     90 tablet    Take 1 tablet by mouth daily    CKD (chronic kidney disease) stage 3, GFR 30-59 ml/min, Hypovitaminosis D

## 2018-06-18 NOTE — PROGRESS NOTES
AUDIOLOGY REPORT    SUBJECTIVE: Nav Laurent is a 77 year old female who was seen in the Audiology Clinic at the [facility] for a fitting of binaural Phonak Parirs V50 hearing aids with earmolds. Previous results have revealed a bilateral possibly mixed hearing loss. The patient was given medical clearance to pursue amplification by  Jose Wheeler MD. She is an experienced user of hearing aids and was accompanied to today's appointment by an .     OBJECTIVE: The hearing aid conformity evaluation was completed.The hearing aids were placed and they provided a good fit. Real-ear-probe-microphone measurements were completed on the [employer] system and were a good match to NAL-NL1 target with soft sounds audible, moderate sounds comfortable, and loud sounds below discomfort. UCLs are verified through maximum power output measures and demonstrate appropriate limiting of loud inputs. Nav was oriented to proper hearing aid use, care, cleaning (no water, dry brush), batteries (size 675, insertion/removal, toxicity, low-battery signal), aid insertion/removal, user booklet, warranty information, storage cases, and other hearing aid details. The patient confirmed understanding of hearing aid use and care, and showed proper insertion of hearing aid and batteries while in the office today. Nav reported good volume and sound quality today.   Of note: due to limitations of feedback, there was difficulty reaching target gain for soft level inputs.  This was reviewed with the patient, and it was decided to order a remake of the earmolds in silicone in the hopes of creating a better seal.  She was in agreement with the plan.  Hearing aids were programmed as follows:  Program 1:Universal  Program and volume control buttons are currently disabled.    EAR(S) FIT: Bilateral  HEARING AID MODEL NAME: Phonak Parris V50  HEARING AID STYLE: Behind-the-ear  EARMOLDS/TIP: full shell acrylic  SERIAL  NUMBERS: Right: [id number] Left:: [id number]  WARRANTY END DATE: 5/20/2020    ASSESSMENT: Binaural hearing aids were fit today. Verification measures were performed. Nav signed the Hearing Aid Purchase Agreement and was given a copy, as well as details on her hearing aids. Patient was counseled that exact out of pocket amounts cannot be determined for hearing aid claims being sent to insurance. Any insurance coverage information presented to the patient is an estimate only, and is not a guarantee of payment. Patient has been advised to check with their own insurance.    PLAN:Nav will return for follow-up in 2-3 weeks for a hearing aid review appointment.  New bilateral earmolds will be ordered. Hearing aids billed according to [place] guidelines. Please call this clinic with questions regarding today s appointment.    Jasiel Anderson  Audiologist  [place] License  #[id number]

## 2018-07-13 NOTE — PROGRESS NOTES
AUDIOLOGY REPORT    SUBJECTIVE:Nav Laurent is a 77 year old female who was seen in the Audiology Clinic at the [facility] on 7/13/2018  for a follow-up check regarding the fitting of new hearing aids. Previous results have revealed bilateral likely mixed hearing loss.  The patient has been seen previously in this clinic and was fit with binaural Phonak Parris V50 BTE hearing aids on 6/18/2018.  Nav reports that her own voice sounds robotic and the physical feel of the volume control is too sharp. Data logging revealed that she was wearing the devices roughly 0.3 hours per day. She was accompanied to today's appointment by her .    OBJECTIVE:   The International Outcome Inventory-Hearing Aids (IOI-HA) was administered today.The patient s responses to the 7 questions can be compared to normative data relative to how others are performing with their hearing aids, as well as focusing audiologic care and counseling.This patient s Quality of Life score (Question 7) was 4, which is at normative average.     Based on patient report, the following changes were made; new silicone full shell earmolds were fit and the feedback test was re-run which allowed for more gain.  Occlusion compensation was set to medium and she reported better sound quality, less robotic and static in her own voice.  The volume control was activated and linked together.  A review of how to use it as a toggle rather than a button was performed and she expressed understanding. She reported it did not hurt when toggling the switch appropriately.    Reviewed 45 day trial period, care, cleaning (no water, dry brush), batteries (size 675) insertion/removal, toxicity, low-battery signal), aid insertion/removal, volume adjustment (if applicable), user booklet, warranty information, storage cases, and other hearing aid details.     Patient reported that she was unsure if she would keep them. It was reviewed that the  trial period ends on 8/2/18, she expressed understanding and stated she would contact the clinic if she needs to return them prior to that day.     ASSESSMENT: A follow-up appointment for hearing aid fitting was completed today. IOI-HA administered today. Changes to hearing aid was completed as outlined above.     PLAN:Nav will return for follow-up as needed, or at least every 6-9 months for cleaning and assessment of hearing aid.  She will contact the clinic by 8/2/18 if she wants to return the devices. Please call this clinic with any questions regarding today s appointment.    Jasiel Anderson  Audiologist  [place] License  #[id number]

## 2018-07-13 NOTE — MR AVS SNAPSHOT
After Visit Summary   2018    Nav Laurent    MRN: [id number]           Patient Information     Date Of Birth          1940        Visit Information        Provider Department      2018 9:15 AM Afia Fitzpatrick AuD; [facility] Audiology        Today's Diagnoses     Mixed hearing loss, bilateral    -  1       Follow-ups after your visit        Who to contact     Please call your clinic at [phone] to:    Ask questions about your health    Make or cancel appointments    Discuss your medicines    Learn about your test results    Speak to your doctor            Additional Information About Your Visit        MyChart Information     [employer]Method is an electronic gateway that provides easy, online access to your medical records. With [employer]Method, you can request a clinic appointment, read your test results, renew a prescription or communicate with your care team.     To sign up for [employer]TV[employer] visit the website at [email or web address]   You will be asked to enter the access code listed below, as well as some personal information. Please follow the directions to create your username and password.     Your access code is: [email or web address]  Expires: 2018  6:30 AM     Your access code will  in 90 days. If you need help or a new code, please contact your [facility] or call [phone] for assistance.        Care EveryWhere ID     This is your Care EveryWhere ID. This could be used by other organizations to access your [place] medical records  [id number]         Blood Pressure from Last 3 Encounters:   13 156/80   13 111/73   13 132/67    Weight from Last 3 Encounters:   13 51.1 kg (112 lb 9.6 oz)   13 49 kg (108 lb)   13 49.9 kg (110 lb)              We Performed the Following     No Charge, Hearing Aid Clinic Visit        Primary Care Provider    None Specified       No primary provider  on file.        Equal Access to Services     KAVON LAURENT : Hadii aad ku hadlauralizz Narcisaali, wagregoriada luqamauryha, qaconnieta magdalenadahusam phillips, manuelito cortez. So [facility] [phone].    ATENCIÓN: Si habla español, tiene a wheeler disposición servicios gratuitos de asistencia lingüística. Llame al [phone].    We comply with applicable federal civil rights laws and [place] laws. We do not discriminate on the basis of race, color, national origin, age, disability, sex, sexual orientation, or gender identity.            Thank you!     Thank you for choosing [facility] AUDIOLOGY  for your care. Our goal is always to provide you with excellent care. Hearing back from our patients is one way we can continue to improve our services. Please take a few minutes to complete the written survey that you may receive in the mail after your visit with us. Thank you!             Your Updated Medication List - Protect others around you: Learn how to safely use, store and throw away your medicines at www.disposemymeds.org.          This list is accurate as of 7/13/18 11:51 AM.  Always use your most recent med list.                   Brand Name Dispense Instructions for use Diagnosis    albuterol (2.5 MG/3ML) 0.083% neb solution     360 mL    Take 3 mLs by nebulization every 4 hours as needed.    Dyspnea       aspirin 81 MG tablet     30 tablet    Take 1 tablet (81 mg) by mouth daily    Diabetes mellitus type 2, uncontrolled (H)       blood glucose monitoring test strip    MIRLANDE CONTOUR    200 strip    Test 4 times daily as directed.    Type II or unspecified type diabetes mellitus without mention of complication, uncontrolled       budesonide 0.5 MG/2ML neb solution    PULMICORT    180 mL    Take 2 mLs (0.5 mg) by nebulization daily    Left heart failure (H)       calcium-vitamin D 600-400 MG-UNIT per tablet    calcium 600/vitamin D    180 tablet    Take 1 tablet by mouth 2 times daily    Hypovitaminosis D        furosemide 40 MG tablet    LASIX    30 tablet    Take 1 tablet (40 mg) by mouth daily    Edema       insulin glargine 100 UNIT/ML injection    LANTUS SOLOSTAR    3 Month    Inject 16 Units Subcutaneous At Bedtime    Diabetes mellitus, type 2 (H)       insulin pen needle 31G X 8 MM    1ST TIER UNIFINE PENTIPS    100 each    Use as needed for insulin administration    Diabetes mellitus type 2, uncontrolled (H)       losartan 50 MG tablet    COZAAR    30 tablet    Take 1 tablet (50 mg) by mouth daily    CHF (congestive heart failure) (H)       nitroGLYcerin 0.4 MG sublingual tablet    NITROSTAT    25 tablet    Place 1 tablet (0.4 mg) under the tongue every 5 minutes as needed    Chronic ischemic heart disease, unspecified       NovoLOG FLEXpen 100 UNITS/ML injection   Generic drug:  insulin aspart     3 Month    Inject 8 Units Subcutaneous 3 times daily (before meals) 8 units before breakfast, 8 units before lunch, 8 units before dinner    Vitamin D insufficiency       ranitidine 300 MG tablet    ZANTAC    30 tablet    Take 1 tablet (300 mg) by mouth At Bedtime    Esophageal reflux       simvastatin 40 MG tablet    ZOCOR    30 tablet    Take 0.5 tablets (20 mg) by mouth At Bedtime    Hyperlipidemia LDL goal <100       SURE COMFORT ALCOHOL PREP 70 % Pads     200 each    Use prior to testing your blood glucose 4 times daily.    Diabetes mellitus type 2, uncontrolled (H)       vitamin D 2000 units tablet     90 tablet    Take 1 tablet by mouth daily    CKD (chronic kidney disease) stage 3, GFR 30-59 ml/min, Hypovitaminosis D

## 2019-01-01 ENCOUNTER — COMMUNICATION - HEALTHEAST (OUTPATIENT)
Dept: FAMILY MEDICINE | Facility: CLINIC | Age: 79
End: 2019-01-01

## 2019-01-01 ENCOUNTER — OFFICE VISIT - HEALTHEAST (OUTPATIENT)
Dept: FAMILY MEDICINE | Facility: CLINIC | Age: 79
End: 2019-01-01

## 2019-01-01 ENCOUNTER — AMBULATORY - HEALTHEAST (OUTPATIENT)
Dept: PHARMACY | Facility: CLINIC | Age: 79
End: 2019-01-01

## 2019-01-01 DIAGNOSIS — Z79.4 TYPE 2 DIABETES MELLITUS WITH RETINOPATHY, WITH LONG-TERM CURRENT USE OF INSULIN, MACULAR EDEMA PRESENCE UNSPECIFIED, UNSPECIFIED LATERALITY, UNSPECIFIED RETINOPATHY SEVERITY (H): ICD-10-CM

## 2019-01-01 DIAGNOSIS — I25.10 CORONARY ARTERY DISEASE INVOLVING NATIVE CORONARY ARTERY OF NATIVE HEART WITHOUT ANGINA PECTORIS: ICD-10-CM

## 2019-01-01 DIAGNOSIS — G89.29 CHRONIC LEFT-SIDED LOW BACK PAIN WITH LEFT-SIDED SCIATICA: ICD-10-CM

## 2019-01-01 DIAGNOSIS — E11.319 TYPE 2 DIABETES MELLITUS WITH RETINOPATHY, WITH LONG-TERM CURRENT USE OF INSULIN, MACULAR EDEMA PRESENCE UNSPECIFIED, UNSPECIFIED LATERALITY, UNSPECIFIED RETINOPATHY SEVERITY (H): ICD-10-CM

## 2019-01-01 DIAGNOSIS — E11.69 TYPE 2 DIABETES MELLITUS WITH OTHER SPECIFIED COMPLICATION (H): ICD-10-CM

## 2019-01-01 DIAGNOSIS — Z12.11 SCREEN FOR COLON CANCER: ICD-10-CM

## 2019-01-01 DIAGNOSIS — M54.42 CHRONIC LEFT-SIDED LOW BACK PAIN WITH LEFT-SIDED SCIATICA: ICD-10-CM

## 2019-01-01 DIAGNOSIS — D64.9 ANEMIA, UNSPECIFIED TYPE: ICD-10-CM

## 2019-01-01 LAB
ALBUMIN SERPL-MCNC: 3.5 G/DL (ref 3.5–5)
ALBUMIN UR-MCNC: ABNORMAL MG/DL
ALP SERPL-CCNC: 50 U/L (ref 45–120)
ALT SERPL W P-5'-P-CCNC: 13 U/L (ref 0–45)
ANION GAP SERPL CALCULATED.3IONS-SCNC: 12 MMOL/L (ref 5–18)
APPEARANCE UR: CLEAR
AST SERPL W P-5'-P-CCNC: 16 U/L (ref 0–40)
BACTERIA #/AREA URNS HPF: ABNORMAL HPF
BACTERIA SPEC CULT: NO GROWTH
BASOPHILS # BLD AUTO: 0 THOU/UL (ref 0–0.2)
BASOPHILS NFR BLD AUTO: 1 % (ref 0–2)
BILIRUB SERPL-MCNC: 0.5 MG/DL (ref 0–1)
BILIRUB UR QL STRIP: NEGATIVE
BUN SERPL-MCNC: 23 MG/DL (ref 8–28)
CALCIUM SERPL-MCNC: 9.5 MG/DL (ref 8.5–10.5)
CHLORIDE BLD-SCNC: 105 MMOL/L (ref 98–107)
CO2 SERPL-SCNC: 21 MMOL/L (ref 22–31)
COLOR UR AUTO: YELLOW
CREAT SERPL-MCNC: 1.43 MG/DL (ref 0.6–1.1)
EOSINOPHIL # BLD AUTO: 0.1 THOU/UL (ref 0–0.4)
EOSINOPHIL NFR BLD AUTO: 1 % (ref 0–6)
ERYTHROCYTE [DISTWIDTH] IN BLOOD BY AUTOMATED COUNT: 12.5 % (ref 11–14.5)
GFR SERPL CREATININE-BSD FRML MDRD: 36 ML/MIN/1.73M2
GLUCOSE BLD-MCNC: 253 MG/DL (ref 70–125)
GLUCOSE UR STRIP-MCNC: ABNORMAL MG/DL
HBA1C MFR BLD: 8.2 % (ref 3.5–6)
HCT VFR BLD AUTO: 38 % (ref 35–47)
HGB BLD-MCNC: 12.2 G/DL (ref 12–16)
HGB UR QL STRIP: NEGATIVE
HYALINE CASTS #/AREA URNS LPF: ABNORMAL LPF
IRON SATN MFR SERPL: 22 % (ref 20–50)
IRON SERPL-MCNC: 65 UG/DL (ref 42–175)
KETONES UR STRIP-MCNC: ABNORMAL MG/DL
LEUKOCYTE ESTERASE UR QL STRIP: ABNORMAL
LYMPHOCYTES # BLD AUTO: 1.4 THOU/UL (ref 0.8–4.4)
LYMPHOCYTES NFR BLD AUTO: 27 % (ref 20–40)
MCH RBC QN AUTO: 27.5 PG (ref 27–34)
MCHC RBC AUTO-ENTMCNC: 32 G/DL (ref 32–36)
MCV RBC AUTO: 86 FL (ref 80–100)
MONOCYTES # BLD AUTO: 0.6 THOU/UL (ref 0–0.9)
MONOCYTES NFR BLD AUTO: 10 % (ref 2–10)
NEUTROPHILS # BLD AUTO: 3.3 THOU/UL (ref 2–7.7)
NEUTROPHILS NFR BLD AUTO: 62 % (ref 50–70)
NITRATE UR QL: NEGATIVE
PH UR STRIP: 5.5 [PH] (ref 5–8)
PLATELET # BLD AUTO: 249 THOU/UL (ref 140–440)
PMV BLD AUTO: 8.3 FL (ref 7–10)
POTASSIUM BLD-SCNC: 5 MMOL/L (ref 3.5–5)
PROT SERPL-MCNC: 6.7 G/DL (ref 6–8)
RBC # BLD AUTO: 4.42 MILL/UL (ref 3.8–5.4)
RBC #/AREA URNS AUTO: ABNORMAL HPF
SODIUM SERPL-SCNC: 138 MMOL/L (ref 136–145)
SP GR UR STRIP: 1.01 (ref 1–1.03)
SQUAMOUS #/AREA URNS AUTO: ABNORMAL LPF
TIBC SERPL-MCNC: 295 UG/DL (ref 313–563)
TRANS CELLS #/AREA URNS HPF: ABNORMAL LPF
TRANSFERRIN SERPL-MCNC: 236 MG/DL (ref 212–360)
UROBILINOGEN UR STRIP-ACNC: ABNORMAL
WBC #/AREA URNS AUTO: ABNORMAL HPF
WBC: 5.3 THOU/UL (ref 4–11)

## 2019-01-01 ASSESSMENT — MIFFLIN-ST. JEOR: SCORE: 827.88

## 2021-05-30 VITALS — WEIGHT: 101 LBS | BODY MASS INDEX: 22.24 KG/M2

## 2021-05-30 VITALS — BODY MASS INDEX: 22.73 KG/M2 | WEIGHT: 103.2 LBS

## 2021-05-30 VITALS — BODY MASS INDEX: 21.36 KG/M2 | WEIGHT: 99 LBS | HEIGHT: 57 IN

## 2021-05-30 VITALS — BODY MASS INDEX: 21.79 KG/M2 | HEIGHT: 57 IN | WEIGHT: 101 LBS

## 2021-05-30 VITALS — HEIGHT: 57 IN | BODY MASS INDEX: 23.15 KG/M2 | WEIGHT: 107.3 LBS

## 2021-05-30 VITALS — WEIGHT: 101.25 LBS | BODY MASS INDEX: 22.3 KG/M2

## 2021-05-30 VITALS — WEIGHT: 106 LBS | BODY MASS INDEX: 23.35 KG/M2

## 2021-05-30 VITALS — BODY MASS INDEX: 22.24 KG/M2 | WEIGHT: 101 LBS

## 2021-05-30 VITALS — WEIGHT: 101.6 LBS | BODY MASS INDEX: 22.38 KG/M2

## 2021-05-30 VITALS — BODY MASS INDEX: 23.74 KG/M2 | WEIGHT: 107.8 LBS

## 2021-05-30 VITALS — BODY MASS INDEX: 22.02 KG/M2 | WEIGHT: 100 LBS

## 2021-05-30 VITALS — BODY MASS INDEX: 24.38 KG/M2 | HEIGHT: 57 IN | WEIGHT: 113 LBS

## 2021-05-30 VITALS — BODY MASS INDEX: 24.83 KG/M2 | WEIGHT: 112.75 LBS

## 2021-05-30 VITALS — WEIGHT: 103.6 LBS | BODY MASS INDEX: 22.82 KG/M2

## 2021-05-30 VITALS — WEIGHT: 111.5 LBS | BODY MASS INDEX: 24.56 KG/M2

## 2021-05-31 VITALS — BODY MASS INDEX: 24.22 KG/M2 | HEIGHT: 57 IN | WEIGHT: 112.25 LBS

## 2021-05-31 VITALS — BODY MASS INDEX: 22.73 KG/M2 | WEIGHT: 103.2 LBS

## 2021-05-31 VITALS — HEIGHT: 57 IN | BODY MASS INDEX: 24.59 KG/M2 | WEIGHT: 114 LBS

## 2021-05-31 VITALS — BODY MASS INDEX: 25.03 KG/M2 | HEIGHT: 57 IN | WEIGHT: 116 LBS

## 2021-05-31 VITALS — WEIGHT: 101.8 LBS | BODY MASS INDEX: 22.42 KG/M2

## 2021-05-31 VITALS — BODY MASS INDEX: 24.5 KG/M2 | WEIGHT: 111.25 LBS

## 2021-05-31 VITALS — WEIGHT: 101.6 LBS | BODY MASS INDEX: 22.38 KG/M2

## 2021-05-31 VITALS — WEIGHT: 110.5 LBS | BODY MASS INDEX: 24.34 KG/M2

## 2021-05-31 VITALS — BODY MASS INDEX: 23.39 KG/M2 | WEIGHT: 106.2 LBS

## 2021-05-31 VITALS — WEIGHT: 103.4 LBS | BODY MASS INDEX: 22.77 KG/M2

## 2021-06-01 VITALS — BODY MASS INDEX: 22.64 KG/M2 | WEIGHT: 101 LBS

## 2021-06-01 VITALS — HEIGHT: 56 IN | BODY MASS INDEX: 24.3 KG/M2 | WEIGHT: 108 LBS

## 2021-06-01 VITALS — BODY MASS INDEX: 24.07 KG/M2 | WEIGHT: 107 LBS | HEIGHT: 56 IN

## 2021-06-01 VITALS — BODY MASS INDEX: 24.59 KG/M2 | WEIGHT: 114 LBS | HEIGHT: 57 IN

## 2021-06-01 VITALS — WEIGHT: 105 LBS | BODY MASS INDEX: 23.54 KG/M2

## 2021-06-01 VITALS — HEIGHT: 56 IN | BODY MASS INDEX: 23.62 KG/M2 | WEIGHT: 105 LBS

## 2021-06-01 VITALS — HEIGHT: 56 IN | BODY MASS INDEX: 23.84 KG/M2 | WEIGHT: 106 LBS

## 2021-06-01 VITALS — WEIGHT: 107 LBS | HEIGHT: 56 IN | BODY MASS INDEX: 24.07 KG/M2

## 2021-06-01 VITALS — HEIGHT: 56 IN | BODY MASS INDEX: 24.07 KG/M2 | WEIGHT: 107 LBS

## 2021-06-01 VITALS — BODY MASS INDEX: 23.09 KG/M2 | WEIGHT: 103 LBS

## 2021-06-01 VITALS — WEIGHT: 107 LBS | BODY MASS INDEX: 23.57 KG/M2

## 2021-06-02 ENCOUNTER — RECORDS - HEALTHEAST (OUTPATIENT)
Dept: ADMINISTRATIVE | Facility: CLINIC | Age: 81
End: 2021-06-02

## 2021-06-02 VITALS — BODY MASS INDEX: 24.3 KG/M2 | HEIGHT: 56 IN | WEIGHT: 108 LBS

## 2021-06-08 NOTE — PROGRESS NOTES
Nav is scheduled for EGD with Dr. Mcbride on 2/7/17 at [facility]. This was scheduled with her son, Lyudmila Laurent and . Lyudmila was given instructions of arrival time, need a , pre-op physical within 30days and NPO after midnight. Lyudmila verbalized understanding.    Isabella Flores [facility]  Physician    [facility]   [phone]

## 2021-06-08 NOTE — PROGRESS NOTES
"CHIEF COMPLAINT: Nav Laurent had concerns including Bloated; Medication Refill; and Eye Problem.    [place]: 1.............. had concerns including Bloated; Medication Refill; and Eye Problem.    1. Anemia    2. Acute on chronic diastolic CHF (congestive heart failure), NYHA class 3    3. Coronary atherosclerosis    4. Vitamin D deficiency    5. Bruit of right carotid artery    6. Diabetes    7. Bloating    8. Cholelithiases    9. Coated tongue    10. Cataracts, bilateral           CC:              Bloating     3 weeks      What makes it worse :      Foods \"turkey with sherrie\", drink water  What makes it better:        Stop eating   How long is it ongoinx week   0/10-10/10:                        Severe  \"keeps awake\"  What's it like:        Pressure     Associated Sx:   Dark stools  No,   No vomiting  Tired    Hemoglobin OK  Pale but NOT Anemic  Treat like Dyspepsia OK for Surgery  Follow through with Yusef Lima.            SUBJECTIVE:  Nav Laurent is a 76 y.o. female    Past Medical History   Diagnosis Date     Alternating esotropia      Anemia      Atherosclerosis      CHF (congestive heart failure)      COPD (chronic obstructive pulmonary disease)      Coronary artery disease      Diabetes mellitus      Dysphagia      Dyspnea      GERD (gastroesophageal reflux disease)      Hyperlipidemia      Hypertension      Ischemic cardiomyopathy      Murmur      Nonproliferative diabetic retinopathy      Occult blood positive stool 2014     Stenosis of larynx      Stroke      Vitamin D deficiency      Past Surgical History   Procedure Laterality Date     Tracheostomy       Review of patient's allergies indicates no known allergies.  Current Outpatient Prescriptions   Medication Sig Dispense Refill     ALCOHOL PREP PADS PadM USE FOUR TIMES A DAY AS DIRECTED/ SIV 4 ZAUG IB HNUB LI TUS ELIZABETH MOB QHIA 200 each 10     aspirin 325 MG EC tablet Take 1 tablet (325 mg total) by mouth daily. 90 tablet 2     atorvastatin " (LIPITOR) 40 MG tablet TAKE 1 PILL BY MOUTH EVERY DAY/ TXHUA HNUB NOJ 1 LUB TSHUAJ PAB BON NTSHAV MUAJ ROJ 90 tablet 3     blood glucose test strips Test blood sugars four times daily -- before meals and bedtime 200 each 11     blood pressure monitor Kit Check blood pressure daily 1 each 0     carvedilol (COREG) 12.5 MG tablet TAKE 1 PILL BY MOUTH 2 TIMES EVERYDAY/ TXHUA HNUB NOJ 1 LUB 2 ZAUG PAB BON NTSHAV SIAB 60 tablet 11     ferrous sulfate 325 (65 FE) MG tablet TAKE 1 TABLET BY MOUTH DAILY WITH BREAKFAST/ THAUM SAWV NTXOV NOJ 1 LUB NROG TSHAIS PAB NTXIV NTSHAV LIAB 100 tablet 3     LANTUS SOLOSTAR 100 unit/mL (3 mL) pen Inject 12 Units under the skin bedtime. E11.39 15 mL 4     losartan (COZAAR) 100 MG tablet TAKE 1 PILL BY MOUTH DAILY/ TXHUA HNUB NOJ 1 LUB TSHUAJ PAB BON NTSHAV SIAB 30 tablet 5     acetaminophen 500 mg coapsule Take 2 capsules by mouth twice daily as needed for back pain 120 capsule 0     albuterol (PROVENTIL) 2.5 mg /3 mL (0.083 %) nebulizer solution Take 3 mL (2.5 mg total) by nebulization every 6 (six) hours as needed for wheezing. 75 mL 12     bisacodyl (DULCOLAX, BISACODYL,) 5 mg EC tablet Take 2 tablets (10 mg total) by mouth daily as needed for constipation. 30 tablet 1     carboxymethylcellulose (REFRESH LIQUIGEL) 1 % ophthalmic solution 1 to 2 drops affected eye as needed for dryness 30 mL 12     cetirizine (ZYRTEC) 10 MG tablet Take 1 tablet (10 mg total) by mouth daily. As needed for allergy symptoms stuffy nose and itchy eyes  0     ipratropium-albuterol (COMBIVENT RESPIMAT)  mcg/actuation Mist inhaler Inhale 1 puff 4 (four) times a day. 1 Inhaler 11     nitroglycerin (NITROSTAT) 0.4 MG SL tablet Place 1 tablet (0.4 mg total) under the tongue every 5 (five) minutes as needed for chest pain. 90 tablet 3     nitroglycerin (NITROSTAT) 0.4 MG SL tablet Place 1 tablet (0.4 mg total) under the tongue every 5 (five) minutes as needed for chest pain. 30 tablet 12     NOVOLOG FLEXPEN  "100 unit/mL injection pen Inject 6 units before lunch and dinner.  E11.39 15 mL 4     omeprazole (PRILOSEC) 20 MG capsule Take 1 capsule (20 mg total) by mouth 2 (two) times a day before meals. 60 capsule 3     pen needle, diabetic (NOVOFINE 32) 32 gauge x 1/4\" Ndle 32 G x 6 MM - Used to inject Novolog and Lantus insulin. Need 4 needles per day. E11.39 150 each 4     No current facility-administered medications for this visit.      No family history on file.  Social History     Social History     Marital status:      Spouse name: N/A     Number of children: N/A     Years of education: N/A     Social History Main Topics     Smoking status: Never Smoker     Smokeless tobacco: Never Used     Alcohol use No     Drug use: No     Sexual activity: Not Currently     Other Topics Concern     None     Social History Narrative     Patient Active Problem List   Diagnosis     Atherosclerosis     Alternating Esotropia     Chronic Obstructive Pulmonary Disease     Murmurs     Vitamin D Deficiency     Hyperlipidemia     Nonproliferative Retinopathy     Acute Myocardial Infarction     Coronary Artery Disease     Ischemic Cardiomyopathy     Congestive Heart Failure     Ischemic Stroke  left side weakness arms/legs     Stenosis Of Larynx     Esophageal Reflux     Anemia     Acute on chronic diastolic CHF (congestive heart failure), NYHA class 3     Hyperlipidemia     CKD (chronic kidney disease) stage 3, GFR 30-59 ml/min     ACS (acute coronary syndrome)     Iron deficiency anemia     Acute systolic heart failure     Myofascial pain syndrome     Lumbago     Joint Pain, Localized in the Shoulder      Cervicalgia     Chronic systolic CHF (congestive heart failure), NYHA class 2     Cardiomyopathy     Hypertension     Pain of upper abdomen     Type 2 diabetes mellitus with ophthalmic manifestations     Bruit of right carotid artery                                              SOCIAL: She  reports that she has never smoked. She has " never used smokeless tobacco. She reports that she does not drink alcohol or use illicit drugs.    REVIEW OF SYSTEMS:     Family reviewed and not pertinent to presenting issue   Review of systems otherwise negative as requested from patient, except   Positive ROS outlined and discussed in [place].    OBJECTIVE:  Visit Vitals     /70 (Patient Site: Right Arm, Patient Position: Sitting, Cuff Size: Adult Regular)     Pulse (!) 55     Temp 97.8  F (36.6  C) (Oral)     Resp 16     Wt 112 lb 12 oz (51.1 kg)     SpO2 98%     Breastfeeding No     BMI 24.83 kg/m2       GENERAL:     No acute distress.   Alert and oriented X 3         Physical:    Left hearing aid  Coated tongue  Lungs clear  Cardiac irregular  Right carotid bruit  Soft +BS  No edema  US ABDOMEN COMPLETE  6/15/2015 9:46 AM     INDICATION: Postprandial pain  COMPARISON: None.     FINDINGS:  Mobile gallstones are identified within the gallbladder. No wall thickening or tenderness over the gallbladder. Bile transit ducts normal with CBD measuring 5.5 mm. Liver parenchyma appears grossly normal.     Pancreas is almost completely obscured by bowel gas. Visualized portions of aorta and IVC appear normal.     Spleen is normal. Kidneys lower limits of normal in size. No hydronephrosis or mass.     IMPRESSION:   CONCLUSION:  1. Cholelithiasis. No inflammatory changes of gallbladder. Bile ducts normal.        ekg OLD lbbb      ASSESSMENT & PLAN      Nav was seen today for bloated, medication refill and eye problem.    Diagnoses and all orders for this visit:    Anemia  -     Cancel: HM1(CBC and Differential)  -     Cancel: Peripheral Blood Smear, Path Review  -     Iron and Transferrin Iron Binding Capacity  -     Ferritin  -     Cancel: HM1 (CBC with Diff)  -     Ambulatory referral to Gastroenterology  -     Morphology,Smear Review (MORP)  -     Manual Differential  -     Peripheral Blood Smear, Path Review    Acute on chronic diastolic CHF (congestive heart  failure), NYHA class 3    Coronary atherosclerosis  -     XR Chest PA and Lateral; Future  -     XR Abdomen Flat and Upright; Future  -     Electrocardiogram Perform and Read    Vitamin D deficiency  -     Vitamin D, Total (25-Hydroxy)    Bruit of right carotid artery    Diabetes  -     Glycosylated Hemoglobin A1c  -     Comprehensive Metabolic Panel    Bloating  -     Lipase  -     Ambulatory referral to Gastroenterology  -     XR Chest PA and Lateral; Future  -     XR Abdomen Flat and Upright; Future    Cholelithiases  -     Ambulatory referral to Gastroenterology    Coated tongue  -     Culture, Yeast    Cataracts, bilateral    Other orders  -     nitroglycerin (NITROSTAT) 0.4 MG SL tablet; Place 1 tablet (0.4 mg total) under the tongue every 5 (five) minutes as needed for chest pain.  -     omeprazole (PRILOSEC) 20 MG capsule; Take 1 capsule (20 mg total) by mouth 2 (two) times a day before meals.  -     bisacodyl (DULCOLAX, BISACODYL,) 5 mg EC tablet; Take 2 tablets (10 mg total) by mouth daily as needed for constipation.        Return if symptoms worsen or fail to improve.       Anticipatory Guidance and Symptomatic Cares Discussed   Advised to call back directly if there are further questions, or if these symptoms fail to improve as anticipated or worsen.  Return to clinic if patient has a clinical concern that warrants an exam.        PREOPERATIVE HISTORY AND PHYSICAL EXAM     SITUATIONAL DATA ELEMENTS:  N/A    SUBJCTIVE  Patient is being seen today for Preoperative Consultation at the request of   Eye Doc  for the underlying condition of cataract    Exam Date: 01/04/17  Examiner: Dr.Daniel Martin MD ([facility])  Primary Provider: Tomas Salazar MD  Surgery Date:  1/5/2017  Scheduled Surgery: cataract surgery    HISTORY OF PRESENT ILLNESS:  Nav Laurent is a 76 y.o. female year old who last saw me 12/17/2016.    Past/Current Medical Problems  Patient Active Problem List   Diagnosis      Atherosclerosis     Alternating Esotropia     Chronic Obstructive Pulmonary Disease     Murmurs     Vitamin D Deficiency     Hyperlipidemia     Nonproliferative Retinopathy     Acute Myocardial Infarction     Coronary Artery Disease     Ischemic Cardiomyopathy     Congestive Heart Failure     Ischemic Stroke  left side weakness arms/legs     Stenosis Of Larynx     Esophageal Reflux     Anemia     Acute on chronic diastolic CHF (congestive heart failure), NYHA class 3     Hyperlipidemia     CKD (chronic kidney disease) stage 3, GFR 30-59 ml/min     ACS (acute coronary syndrome)     Iron deficiency anemia     Acute systolic heart failure     Myofascial pain syndrome     Lumbago     Joint Pain, Localized in the Shoulder      Cervicalgia     Chronic systolic CHF (congestive heart failure), NYHA class 2     Cardiomyopathy     Hypertension     Pain of upper abdomen     Type 2 diabetes mellitus with ophthalmic manifestations     Bruit of right carotid artery       Hospitalizations:  None    Surgeries:    Past Surgical History   Procedure Laterality Date     Tracheostomy         Family History:  Nav Laurent's family history is not on file.  Anesthesia Reactions: None  Bleeding Disorders: None    Social History:  she  reports that she has never smoked. She has never used smokeless tobacco. She reports that she does not drink alcohol or use illicit drugs.    Medications:    Current Outpatient Prescriptions:      ALCOHOL PREP PADS PadM, USE FOUR TIMES A DAY AS DIRECTED/ SIV 4 ZAUG IB HNUB LI TUS ELIZABETH MOB QHIA, Disp: 200 each, Rfl: 10     aspirin 325 MG EC tablet, Take 1 tablet (325 mg total) by mouth daily., Disp: 90 tablet, Rfl: 2     atorvastatin (LIPITOR) 40 MG tablet, TAKE 1 PILL BY MOUTH EVERY DAY/ TXSHITALA ODALIS NOJ 1 LUB TSHUAJ PAB BON NTSHAV MUAJ ROJ, Disp: 90 tablet, Rfl: 3     blood glucose test strips, Test blood sugars four times daily -- before meals and bedtime, Disp: 200 each, Rfl: 11     blood pressure monitor  Kit, Check blood pressure daily, Disp: 1 each, Rfl: 0     carvedilol (COREG) 12.5 MG tablet, TAKE 1 PILL BY MOUTH 2 TIMES EVERYDAY/ TXHUA HNUB NOJ 1 LUB 2 ZAUG PAB BON NTSHAV SIAB, Disp: 60 tablet, Rfl: 11     ferrous sulfate 325 (65 FE) MG tablet, TAKE 1 TABLET BY MOUTH DAILY WITH BREAKFAST/ THAUM SAWV NTXOV NOJ 1 LUB NROG TSHAIS PAB NTXIV NTSHAV LIAB, Disp: 100 tablet, Rfl: 3     LANTUS SOLOSTAR 100 unit/mL (3 mL) pen, Inject 12 Units under the skin bedtime. E11.39, Disp: 15 mL, Rfl: 4     losartan (COZAAR) 100 MG tablet, TAKE 1 PILL BY MOUTH DAILY/ TXHUA HNUB NOJ 1 LUB TSHUAJ PAB BON NTSHAV SIAB, Disp: 30 tablet, Rfl: 5     acetaminophen 500 mg coapsule, Take 2 capsules by mouth twice daily as needed for back pain, Disp: 120 capsule, Rfl: 0     albuterol (PROVENTIL) 2.5 mg /3 mL (0.083 %) nebulizer solution, Take 3 mL (2.5 mg total) by nebulization every 6 (six) hours as needed for wheezing., Disp: 75 mL, Rfl: 12     bisacodyl (DULCOLAX, BISACODYL,) 5 mg EC tablet, Take 2 tablets (10 mg total) by mouth daily as needed for constipation., Disp: 30 tablet, Rfl: 1     carboxymethylcellulose (REFRESH LIQUIGEL) 1 % ophthalmic solution, 1 to 2 drops affected eye as needed for dryness, Disp: 30 mL, Rfl: 12     cetirizine (ZYRTEC) 10 MG tablet, Take 1 tablet (10 mg total) by mouth daily. As needed for allergy symptoms stuffy nose and itchy eyes, Disp: , Rfl: 0     ipratropium-albuterol (COMBIVENT RESPIMAT)  mcg/actuation Mist inhaler, Inhale 1 puff 4 (four) times a day., Disp: 1 Inhaler, Rfl: 11     nitroglycerin (NITROSTAT) 0.4 MG SL tablet, Place 1 tablet (0.4 mg total) under the tongue every 5 (five) minutes as needed for chest pain., Disp: 90 tablet, Rfl: 3     nitroglycerin (NITROSTAT) 0.4 MG SL tablet, Place 1 tablet (0.4 mg total) under the tongue every 5 (five) minutes as needed for chest pain., Disp: 30 tablet, Rfl: 12     NOVOLOG FLEXPEN 100 unit/mL injection pen, Inject 6 units before lunch and dinner.   "E11.39, Disp: 15 mL, Rfl: 4     omeprazole (PRILOSEC) 20 MG capsule, Take 1 capsule (20 mg total) by mouth 2 (two) times a day before meals., Disp: 60 capsule, Rfl: 3     pen needle, diabetic (NOVOFINE 32) 32 gauge x 1/4\" Ndle, 32 G x 6 MM - Used to inject Novolog and Lantus insulin. Need 4 needles per day. E11.39, Disp: 150 each, Rfl: 4  HELD MEDICATIONS: None.    Allergies:  No latex allergies.  No Known Allergies    REVIEW OF SYSTEMS:  Remainder of complete head to toe negative.   CARDIOVASCULAR: Negative for CAD, CHF, Valvular Disease, HTN, Atrial Fibrillation, Other Arrhythmia, ICD/Pacer, Peripheral Vascular Disease.  PULMONARY: Negative for COPD, Asthma, Apnea.  RENAL: Negative for Chronic diuretic use, Renal Failure, Dialysis.  ENDOCRINE: Negative for Diabetes, Chronic steroid use, Thyroid Disease.  GI: Negative for GERD, Hiatal Hernia, Hepatic Disease.  NEURO: Negative for CVA, TIA, Seizures, Neuropathy.  HEMATOLOGIC DISEASE: Negative for Clotting Disorder, Bleeding Disorder, Anemia.  MUSCULOSKELETAL: Negative for Arthritis, Muscular Dystrophy.  MENTAL HEALTH: Negative for Anxiety, Depression, Dementia, ETOH/Drug History.  OTHER: Negative for increased risk for excessive blood loss, potential for refusing blood products, MRSA, VRE, HIV, AIDS, TB or pregnancy in women.    PHYSICAL EXAMINATION  CONSTITUTIONAL - In general, no acute distress.  Vitals:    01/03/17 0918   BP: 126/70   Pulse: (!) 55   Resp: 16   Temp: 97.8  F (36.6  C)   SpO2: 98%       SKIN   Pale   No rash, lesions or ulcers.    HEENT:  Normal conjunctiva and lids.  Extraoccular movements affected by strabismus  Pupils equal, reactive to light and accomodation.  ++ Cataracts.  Clear tympanic membranes  Non-inflamed Nasal Mucosa.  Clear external auditory canals.  Normal Hearing grosslyl.    Normal gums/teeth.  oropharynx. Coated tongue  No thyromegaly, tenderness or mass.  Normal carotid artery without bruits    RESPIRATORY:    Normal respiratory " effort.  Clear to auscultation bilaterally.    CARDIAC:    Regular rate and rhythm, normal S1 S2.  No murmurs rubs or gallops..  Normal and symmetric pedal pulses.    No edema or varicosities.    GI/ABDOMEN  Soft  Non-tender  Non-distended  Present Bowel sounds.    No Hepatosplenomegaly.      MUSCULOSKELETAL -     Normal gait and station.    Normal digits and nails.  Normal bones, joints and muscles including head and neck, spine and pelvis, and extremities.    NEUROLOGIC      Intact Cranial Nerves II-XII .     PSYCHIATRIC      Mood and affect congruent.    Good Insight and Judgement.  Oriented to time, place and person.    Normal Eye contact.    LYMPH/HEME    No Bruising    DATA:  LABS:   Physical on 01/03/2017   Component Date Value Ref Range Status     Iron 01/03/2017 56  42 - 175 ug/dL Final     Transferrin 01/03/2017 227  212 - 360 mg/dL Final     Transferrin Saturation 01/03/2017 20  20 - 50 % Final     Transferrin IBC 01/03/2017 284* 313 - 563 ug/dL Final     Ferritin 01/03/2017 135* 10 - 130 ng/mL Final     Hemoglobin A1c 01/03/2017 9.1* 3.5 - 6.0 % Final     Sodium 01/03/2017 140  136 - 145 mmol/L Final     Potassium 01/03/2017 5.0  3.5 - 5.0 mmol/L Final     Chloride 01/03/2017 108* 98 - 107 mmol/L Final     CO2 01/03/2017 24  22 - 31 mmol/L Final     Anion Gap, Calculation 01/03/2017 8  5 - 18 mmol/L Final     Glucose 01/03/2017 112  70 - 125 mg/dL Final     BUN 01/03/2017 16  8 - 28 mg/dL Final     Creatinine 01/03/2017 0.98  0.60 - 1.10 mg/dL Final     GFR MDRD Af Amer 01/03/2017 >60  >60 mL/min/1.73m2 Final     GFR MDRD Non Af Amer 01/03/2017 55* >60 mL/min/1.73m2 Final     Bilirubin, Total 01/03/2017 0.9  0.0 - 1.0 mg/dL Final     Calcium 01/03/2017 9.3  8.5 - 10.5 mg/dL Final     Protein, Total 01/03/2017 6.2  6.0 - 8.0 g/dL Final     Albumin 01/03/2017 3.4* 3.5 - 5.0 g/dL Final     Alkaline Phosphatase 01/03/2017 47  45 - 120 U/L Final     AST 01/03/2017 34  0 - 40 U/L Final     ALT 01/03/2017 40  0  - 45 U/L Final     Vitamin D, Total (25-Hydroxy) 01/03/2017 33.3  30.0 - 80.0 ng/mL Final     Lipase 01/03/2017 21  0 - 52 U/L Final     VENTRICULAR RATE 01/03/2017 65  BPM Final     ATRIAL RATE 01/03/2017 65  BPM Final     P-R INTERVAL 01/03/2017 190  ms Final     QRS DURATION 01/03/2017 148  ms Final     Q-T INTERVAL 01/03/2017 514  ms Final     QTC CALCULATION (BEZET) 01/03/2017 534  ms Final     P Axis 01/03/2017 55  degrees Final     R AXIS 01/03/2017 10  degrees Final     T AXIS 01/03/2017 162  degrees Final     MUSE DIAGNOSIS 01/03/2017    Final                    Value:Normal sinus rhythm with sinus arrhythmia  Left bundle branch block  Abnormal ECG  When compared with ECG of 07-JUL-2014 02:18,  Vent. rate has decreased BY  48 BPM  Confirmed by LEVI MCCULLOUGH MD LOC: ([id number]) on 1/4/2017 2:08:00 PM       Pathology, Smear Review 01/03/2017 See Separate Pathology Report* (none) Final     WBC 01/03/2017 5.6  4.0 - 11.0 thou/uL Final     RBC 01/03/2017 4.38  3.80 - 5.40 mill/uL Final     Hemoglobin 01/03/2017 12.1  12.0 - 16.0 g/dL Final     Hematocrit 01/03/2017 39.1  35.0 - 47.0 % Final     MCV 01/03/2017 89  80 - 100 fL Final     MCH 01/03/2017 27.6  27.0 - 34.0 pg Final     MCHC 01/03/2017 30.9* 32.0 - 36.0 g/dL Final     RDW 01/03/2017 12.5  11.0 - 14.5 % Final     Platelets 01/03/2017 197  140 - 440 thou/uL Final     MPV 01/03/2017 12.2  8.5 - 12.5 fL Final     Neutrophils % 01/03/2017 60  50 - 70 % Final     Lymphocytes % 01/03/2017 26  20 - 40 % Final     Monocytes % 01/03/2017 11* 2 - 10 % Final     Eosinophils % 01/03/2017 3  0 - 6 % Final     Basophils % 01/03/2017 1  0 - 2 % Final     Neutrophils Absolute 01/03/2017 3.3  2.0 - 7.7 thou/uL Final     Lymphocytes Absolute 01/03/2017 1.4  0.8 - 4.4 thou/uL Final     Monocytes Absolute 01/03/2017 0.6  0.0 - 0.9 thou/uL Final     Eosinophils Absolute 01/03/2017 0.2  0.0 - 0.4 thou/uL Final     Basophils Absolute 01/03/2017 0.0  0.0 - 0.2 thou/uL Final      Platelet Estimate 01/03/2017 Normal  Normal Final     Ovalocytes 01/03/2017 1+* Negative Final     Schistocytes 01/03/2017 1+* Negative Final     Case Report 01/03/2017    Final                    Value:Peripheral Blood Morphology Report                Case: [id number]                                   Authorizing Provider:  Tomas Salazar MD        Collected:           01/03/2017 1028              Ordering Location:     [facility] Received:            01/04/2017 0857                                     Medicine/OB                                                                  Pathologist:           Chintan Hammond MD                                                         Specimen:    Peripheral Blood                                                                            Final Diagnosis 01/03/2017    Final                    Value:PERIPHERAL BLOOD    - REACTIVE-APPEARING LYMPHOCYTES AND MONOCYTES ARE PRESENT    - NEGATIVE FOR ACUTE LEUKEMIA    - UNREMARKABLE PLATELETS    - PLEASE SEE COMMENT     Comment 01/03/2017    Final                    Value:The reactive-appearing lymphocytes and monocytes may represent a recent inflammatory or infectious process. Recommend clinical correlation and follow-up. Definitive schistocytes are not identified on morphologic examination, but rare schistocytes are identified on automated analysis. Hemoglobin and hematocrit are within normal limits, but MCHC is mildly decreased. The significance of this finding is not clear. Consider repeat CBC in one to two weeks; if hemoglobin decreases, the differential diagnosis could include low-grade hemolysis.     Clinical Information 01/03/2017    Final                    Value:D64.9     Charges 01/03/2017    Final                    Value:CPT: 61230  ICD-10: R79.9   ]  EKG:       Old LBBB  Sinus      CHEST XRAY:     Not/Indicated      PREGNANCY TEST: N/A    ASSESSMENT & PLAN  Nav was seen today for bloated,  medication refill and eye problem.    Diagnoses and all orders for this visit:    Anemia  -     Cancel: HM1(CBC and Differential)  -     Cancel: Peripheral Blood Smear, Path Review  -     Iron and Transferrin Iron Binding Capacity  -     Ferritin  -     Cancel: HM1 (CBC with Diff)  -     Ambulatory referral to Gastroenterology  -     Morphology,Smear Review (MORP)  -     Manual Differential  -     Peripheral Blood Smear, Path Review    Acute on chronic diastolic CHF (congestive heart failure), NYHA class 3    Coronary atherosclerosis  -     XR Chest PA and Lateral; Future  -     XR Abdomen Flat and Upright; Future  -     Electrocardiogram Perform and Read    Vitamin D deficiency  -     Vitamin D, Total (25-Hydroxy)    Bruit of right carotid artery    Diabetes  -     Glycosylated Hemoglobin A1c  -     Comprehensive Metabolic Panel    Bloating  -     Lipase  -     Ambulatory referral to Gastroenterology  -     XR Chest PA and Lateral; Future  -     XR Abdomen Flat and Upright; Future    Cholelithiases  -     Ambulatory referral to Gastroenterology    Coated tongue  -     Culture, Yeast    Cataracts, bilateral    Other orders  -     nitroglycerin (NITROSTAT) 0.4 MG SL tablet; Place 1 tablet (0.4 mg total) under the tongue every 5 (five) minutes as needed for chest pain.  -     omeprazole (PRILOSEC) 20 MG capsule; Take 1 capsule (20 mg total) by mouth 2 (two) times a day before meals.  -     bisacodyl (DULCOLAX, BISACODYL,) 5 mg EC tablet; Take 2 tablets (10 mg total) by mouth daily as needed for constipation.        PLAN:  Patient approved for surgery with general or local anesthesia.   THis is an addendum from 1/4/2017. Patient is bloated but is OK for Eye Surgery.  Instructions given    Post-operative pain to be managed by Surgeon during post-operative timeframe, as defined by [facility]  Postoperative Care will be managed by Hospital Service, if admitted.  Stop Aspirin, NSAID's (Ibuprofen, Aleve) and  other Medications as relayed to Patient  D/c only meds requested by Eye Docs  Follow Recommendations for Preoperative Medications from Surgery Team  Above recommendations were reviewed with patient.  1/2 dose Lantus  Night prior to surgery  Hold Novolog AM of surgery      INFORM OUR OFFICE OR SURGEON  IF ANY NEW ILLNESS OR CLINICAL CONCERN FELT PERTINENT TO SAFELY PROCEEDING WITH SURGERY     Preoperative Cardiac Risk Stratificaiton and Management    Acceptable Risk Cardiac risk assessment  Beta-blocker protocol   Not indicated    NO active Cardiac Conditions including     NO Unstable/Severe Angina,   NO Recent Myocardial Infarction (<3 mo),   NO New, worsening or decompensated heart failure,   NO Significant Arrhythmias,   NO Severe Valvular Disease]    Low Risk Surgery.  Functional Capacity > 4 METS. Assessed such as: (Walking 4 MPH, Shoveling Snow, Mowing Lawn, Heavy Cleaning (Washing Windows, Vacuuming or Mopping)  No Beta Blockage/cardiac evaluation:  No Ischemic Heart Disease; Compensated or prior heart failure; Diabetes mellitus;  Chronic kidney disease; Cerebrovascular disease.     Final Disposition  Proceed with proposed surgery without additional clinical clarifications  No Cardiology consultation or non-invasive testing.          30  Min Total Time, > 50% counseling and coordination of Care    Tomas Salazar MD  Family Medicine   [facility] [address] [phone]

## 2021-06-08 NOTE — PROGRESS NOTES
CHIEF COMPLAINT: Nav Laurent had concerns including Follow-up and Medication Refill.    [place]: 1.............. had concerns including Follow-up and Medication Refill.    1. Bilateral carotid bruits    2. Coronary atherosclerosis    3. Acute on chronic systolic congestive heart failure    4. Diabetes    5. Fatigue    6. COPD (chronic obstructive pulmonary disease)    7. Anemia           CC:              Pt states hospital stay f/u for fluid in lungs     What makes it worse :       None   What makes it better:         none  How long is it ongoing:      Last week   0/10-10/10:                        0  What's it like:                      Pt states shortness of breath     Associated Sx:   None     IMPRESSION:   CONCLUSION:  1. Pulmonary arteries are well-opacified and there is no evidence for pulmonary embolus in either lung. There is no thoracic aortic aneurysm or thoracic aortic dissection.  2. Cardiomegaly with bilateral pleural effusions and mild film edema pattern as well as reflux of contrast into the inferior vena cava and hepatic veins. Findings are consistent with congestive heart failure.     NOTE: ABNORMAL REPORT     THE DICTATION ABOVE DESCRIBES AN ABNORMALITY FOR WHICH FOLLOW-UP IS NEEDED.     Medications reconciled    SUBJECTIVE:  Nav Laurent is a 76 y.o. female    Past Medical History:   Diagnosis Date     Alternating esotropia      Anemia      Atherosclerosis      CHF (congestive heart failure)      COPD (chronic obstructive pulmonary disease)      Coronary artery disease     status post MI times two with BMS to LAD     Diabetes mellitus      Dysphagia      Dyspnea      GERD (gastroesophageal reflux disease)      Hyperlipidemia      Hypertension      Ischemic cardiomyopathy      Ischemic cardiomyopathy     EF 20-25% - follows with Dr. Freed     Murmur      Nonproliferative diabetic retinopathy      Occult blood positive stool 05/29/2014     Stenosis of larynx      Stroke      Vitamin D deficiency      Past  Surgical History:   Procedure Laterality Date     CARDIAC CATHETERIZATION       ESOPHAGOGASTRODUODENOSCOPY N/A 2/7/2017    Procedure: ESOPHAGOGASTRODUODENOSCOPY ;  Surgeon: Mitchell Mcbride MD;  Location: [facility];  Service:      TRACHEOSTOMY       Review of patient's allergies indicates no known allergies.  Current Outpatient Prescriptions   Medication Sig Dispense Refill     acetaminophen 500 mg coapsule Take 2 capsules by mouth twice daily as needed for back pain (Patient taking differently: Take 1,000 mg by mouth 2 (two) times a day as needed for pain (back pain). ) 120 capsule 0     albuterol (PROVENTIL) 2.5 mg /3 mL (0.083 %) nebulizer solution Take 3 mL (2.5 mg total) by nebulization every 6 (six) hours as needed for wheezing. 75 mL 12     alcohol swabs (ALCOHOL PREP PADS) PadM USE FOUR TIMES A DAY AS DIRECTED/ SIV 4 ZAUG IB HNUB LI TUS ELIZABETH MOB QHIA 200 each 10     atorvastatin (LIPITOR) 40 MG tablet Take 1 tablet (40 mg total) by mouth bedtime. 90 tablet 1     bisacodyl (DULCOLAX, BISACODYL,) 5 mg EC tablet Take 2 tablets (10 mg total) by mouth daily as needed for constipation. 30 tablet 1     blood glucose test strips Test blood sugars four times daily -- before meals and bedtime 200 each 11     blood pressure monitor Kit Check blood pressure daily 1 each 0     carboxymethylcellulose (REFRESH LIQUIGEL) 1 % ophthalmic solution 1 to 2 drops affected eye as needed for dryness (Patient taking differently: Apply 1-2 drops to eye as needed. 1 to 2 drops affected eye as needed for dryness) 30 mL 12     carvedilol (COREG) 12.5 MG tablet Take 1 tablet (12.5 mg total) by mouth 2 (two) times a day with meals. 180 tablet 1     ferrous sulfate 325 (65 FE) MG tablet Take 1 tablet by mouth daily with breakfast.       ipratropium-albuterol (COMBIVENT RESPIMAT)  mcg/actuation Mist inhaler Inhale 1 puff 4 (four) times a day. 1 Inhaler 11     LANTUS SOLOSTAR 100 unit/mL (3 mL) pen Inject 8 Units under the  "skin bedtime. E11.39 15 mL 4     losartan (COZAAR) 100 MG tablet TAKE 1 PILL BY MOUTH DAILY/ TXHUA HNUB NOJ 1 LUB TSHUAJ PAB BON NTSHAV SIAB 90 tablet 1     nitroglycerin (NITROSTAT) 0.4 MG SL tablet Place 1 tablet (0.4 mg total) under the tongue every 5 (five) minutes as needed for chest pain. 90 tablet 3     NOVOLOG FLEXPEN 100 unit/mL injection pen Inject 3 Units under the skin 2 (two) times daily before lunch and supper. 15 mL 4     omeprazole (PRILOSEC) 20 MG capsule Take 1 capsule (20 mg total) by mouth 2 (two) times a day before meals. 60 capsule 3     pen needle, diabetic (NOVOFINE 32) 32 gauge x 1/4\" Ndle 32 G x 6 MM - Used to inject Novolog and Lantus insulin. Need 4 needles per day. E11.39 150 each 4     spironolactone (ALDACTONE) 25 MG tablet Take 1 tablet (25 mg total) by mouth daily. 30 tablet 0     aspirin 325 MG EC tablet Take 1 tablet (325 mg total) by mouth daily. 100 tablet 2     cetirizine (ZYRTEC) 10 MG tablet Take 1 tablet (10 mg total) by mouth daily. As needed for allergy symptoms stuffy nose and itchy eyes (Patient taking differently: Take 10 mg by mouth daily as needed. As needed for allergy symptoms stuffy nose and itchy eyes)  0     furosemide (LASIX) 20 MG tablet Take 1 tablet (20 mg total) by mouth daily. 90 tablet 1     HYDROcodone-acetaminophen 5-325 mg per tablet Take 1 tablet by mouth every 4 (four) hours as needed for pain. 15 tablet 0     Current Facility-Administered Medications   Medication Dose Route Frequency Provider Last Rate Last Dose     furosemide injection 20 mg (LASIX)  20 mg Intravenous Once Tomas Salazar MD         No family history on file.  Social History     Social History     Marital status:      Spouse name: N/A     Number of children: N/A     Years of education: N/A     Social History Main Topics     Smoking status: Never Smoker     Smokeless tobacco: Never Used     Alcohol use No     Drug use: No     Sexual activity: Not Currently     Other Topics " Concern     None     Social History Narrative    Lives with family     Patient Active Problem List   Diagnosis     Atherosclerosis     Alternating Esotropia     Chronic Obstructive Pulmonary Disease     Murmurs     Vitamin D Deficiency     Hyperlipidemia     Nonproliferative Retinopathy     Acute Myocardial Infarction     Coronary Artery Disease     Ischemic Cardiomyopathy     Congestive Heart Failure     Ischemic Stroke  left side weakness arms/legs     Stenosis Of Larynx     Esophageal Reflux     Anemia     Acute on chronic diastolic CHF (congestive heart failure), NYHA class 3     Hyperlipidemia     CKD (chronic kidney disease) stage 3, GFR 30-59 ml/min     ACS (acute coronary syndrome)     Iron deficiency anemia     Acute systolic heart failure     Myofascial pain syndrome     Lumbago     Joint Pain, Localized in the Shoulder      Cervicalgia     Acute on chronic systolic CHF (congestive heart failure), NYHA class 3     Cardiomyopathy     Hypertension     Pain of upper abdomen     Type 2 diabetes mellitus with ophthalmic manifestations     Bruit of right carotid artery     Elevated troponin     CHF exacerbation     Calculus of gallbladder without cholecystitis     Coronary artery disease involving native coronary artery of native heart without angina pectoris                                              SOCIAL: She  reports that she has never smoked. She has never used smokeless tobacco. She reports that she does not drink alcohol or use illicit drugs.    REVIEW OF SYSTEMS:     Family reviewed and not pertinent to presenting issue   Review of systems otherwise negative as requested from patient, except   Positive ROS outlined and discussed in [place].    OBJECTIVE:  Visit Vitals     /70 (Patient Site: Right Arm, Patient Position: Sitting, Cuff Size: Adult Regular)     Pulse 63     Resp 16     Wt 111 lb 8 oz (50.6 kg)     SpO2 98%     Breastfeeding No     BMI 24.56 kg/m2       GENERAL:     No acute distress.    Alert and oriented X 3         Physical:    Carotid pulses are full positive bruits bilaterally  No JVP  Coarse breath sounds bilaterally  Cardiac S1-S2 positive S3 or valvular click  Echo revealed infection ejection fraction 19%  Abdomen soft  Trace lower extremity edema very pale  Normal fingers, No swelling, No tenderness, Full range of motion, No deformity, Normal nails, Normal capillary refill.    Recent Results (from the past 240 hour(s))   Vitamin B12   Result Value Ref Range    Vitamin B-12 433 213 - 816 pg/mL   Comprehensive Metabolic Panel   Result Value Ref Range    Sodium 139 136 - 145 mmol/L    Potassium 5.1 (H) 3.5 - 5.0 mmol/L    Chloride 110 (H) 98 - 107 mmol/L    CO2 21 (L) 22 - 31 mmol/L    Anion Gap, Calculation 8 5 - 18 mmol/L    Glucose 126 (H) 70 - 125 mg/dL    BUN 25 8 - 28 mg/dL    Creatinine 1.52 (H) 0.60 - 1.10 mg/dL    GFR MDRD Af Amer 41 (L) >60 mL/min/1.73m2    GFR MDRD Non Af Amer 33 (L) >60 mL/min/1.73m2    Bilirubin, Total 0.4 0.0 - 1.0 mg/dL    Calcium 8.9 8.5 - 10.5 mg/dL    Protein, Total 6.0 6.0 - 8.0 g/dL    Albumin 3.2 (L) 3.5 - 5.0 g/dL    Alkaline Phosphatase 47 45 - 120 U/L    AST 36 0 - 40 U/L    ALT 30 0 - 45 U/L   Thyroid Stimulating Hormone (TSH)   Result Value Ref Range    TSH 2.19 0.30 - 5.00 uIU/mL   HM1 (CBC with Diff)   Result Value Ref Range    WBC 5.6 4.0 - 11.0 thou/uL    RBC 3.20 (L) 3.80 - 5.40 mill/uL    Hemoglobin 8.9 (L) 12.0 - 16.0 g/dL    Hematocrit 27.6 (L) 35.0 - 47.0 %    MCV 86 80 - 100 fL    MCH 27.9 27.0 - 34.0 pg    MCHC 32.2 32.0 - 36.0 g/dL    RDW 14.4 11.0 - 14.5 %    Platelets 272 140 - 440 thou/uL    MPV 8.6 7.0 - 10.0 fL    Neutrophils % 64 50 - 70 %    Lymphocytes % 25 20 - 40 %    Monocytes % 7 2 - 10 %    Eosinophils % 3 0 - 6 %    Basophils % 0 0 - 2 %    Neutrophils Absolute 3.6 2.0 - 7.7 thou/uL    Lymphocytes Absolute 1.4 0.8 - 4.4 thou/uL    Monocytes Absolute 0.4 0.0 - 0.9 thou/uL    Eosinophils Absolute 0.2 0.0 - 0.4 thou/uL     Basophils Absolute 0.0 0.0 - 0.2 thou/uL   Microalbumin, Random Urine   Result Value Ref Range    Microalbumin, Random Urine 14.53 (H) 0.00 - 1.99 mg/dL    Creatinine, Urine 118.8 mg/dL    Microalbumin/Creatinine Ratio Random Urine 122.3 (H) <=19.9 mg/g   Urinalysis-UC if Indicated   Result Value Ref Range    Color, UA Yellow Colorless, Yellow, Straw, Light Yellow    Clarity, UA Clear Clear    Glucose, UA Negative Negative    Bilirubin, UA Negative Negative    Ketones, UA Negative Negative    Specific Gravity, UA <=1.005 1.002 - 1.030    Blood, UA Negative Negative    pH, UA 5.5 4.5 - 8.0    Protein, UA 30 mg/dL (!) Negative mg/dL    Urobilinogen, UA 0.2 E.U./dL 0.2 E.U./dL, 1.0 E.U./dL    Nitrite, UA Negative Negative    Leukocytes, UA Trace (!) Negative    Bacteria, UA None Seen None Seen hpf    RBC, UA None Seen None Seen, 0-2 hpf    WBC, UA 0-5 None Seen, 0-5 hpf    Squam Epithel, UA 5-10 (!) None Seen, 0-5 lpf    Trans Epithel, UA 0-5 (!) None Seen lpf   Glycosylated Hemoglobin A1c   Result Value Ref Range    Hemoglobin A1c 7.9 (H) 3.5 - 6.0 %   Culture, Urine   Result Value Ref Range    Culture No Growth    Iron and Transferrin Iron Binding Capacity   Result Value Ref Range    Iron 69 42 - 175 ug/dL    Transferrin 236 212 - 360 mg/dL    Transferrin Saturation 23 20 - 50 %    Transferrin  (L) 313 - 563 ug/dL   Ferritin   Result Value Ref Range    Ferritin 142 (H) 10 - 130 ng/mL   Reticulocytes   Result Value Ref Range    Retic Absolute Count 0.102 0.010 - 0.110 mill/uL   HM1 (CBC with Diff)   Result Value Ref Range    WBC 4.6 4.0 - 11.0 thou/uL    RBC 3.48 (L) 3.80 - 5.40 mill/uL    Hemoglobin 9.6 (L) 12.0 - 16.0 g/dL    Hematocrit 29.8 (L) 35.0 - 47.0 %    MCV 86 80 - 100 fL    MCH 27.6 27.0 - 34.0 pg    MCHC 32.3 32.0 - 36.0 g/dL    RDW 15.0 (H) 11.0 - 14.5 %    Platelets 309 140 - 440 thou/uL    MPV 7.7 7.0 - 10.0 fL    Neutrophils % 49 (L) 50 - 70 %    Lymphocytes % 36 20 - 40 %    Monocytes % 10 2 -  10 %    Eosinophils % 5 0 - 6 %    Basophils % 0 0 - 2 %    Neutrophils Absolute 2.2 2.0 - 7.7 thou/uL    Lymphocytes Absolute 1.6 0.8 - 4.4 thou/uL    Monocytes Absolute 0.5 0.0 - 0.9 thou/uL    Eosinophils Absolute 0.2 0.0 - 0.4 thou/uL    Basophils Absolute 0.0 0.0 - 0.2 thou/uL   Type and Screen   Result Value Ref Range    ABORh A POS     Antibody Screen Negative Negative   Crossmatch   Result Value Ref Range    Crossmatch Compatible     Blood Expiration Date [id number]     Unit Type A Neg     Unit Number [id number]     Status Transfused     Component Red Blood Cells     PRODUCT CODE [id number]     Issue Date and Time [id number]     Blood Type 0600     CODING SYSTEM [id number]    Crossmatch   Result Value Ref Range    Crossmatch Compatible     Blood Expiration Date [id number]     Unit Type A Pos     Unit Number [id number]     Status Transfused     Component Red Blood Cells     PRODUCT CODE [id number]     Issue Date and Time [id number]     Blood Type 6200     CODING SYSTEM [id number]          ASSESSMENT & PLAN      Nav was seen today for follow-up and medication refill.    Diagnoses and all orders for this visit:    Bilateral carotid bruits  -     US Carotid Bilateral; Future  -     Ambulatory referral to Cardiology    Coronary atherosclerosis  -     Discontinue: furosemide (LASIX) 20 MG tablet; Take 1 tablet (20 mg total) by mouth daily.  -     Ambulatory referral to Cardiology  -     furosemide (LASIX) 20 MG tablet; Take 1 tablet (20 mg total) by mouth daily.  -     Ambulatory referral to Home Health    Acute on chronic systolic congestive heart failure  -     Ambulatory referral to Cardiology  -     spironolactone (ALDACTONE) 25 MG tablet; Take 1 tablet (25 mg total) by mouth daily.  -     Ambulatory referral to Home Health  -     Urinalysis-UC if Indicated  -     Culture, Urine    Diabetes  -     alcohol swabs (ALCOHOL PREP PADS) PadM; USE FOUR TIMES A DAY AS DIRECTED/ SIV 4 MELLO MARTIN  ELIZABETH MOB QHIA  -     Microalbumin, Random Urine  -     Glycosylated Hemoglobin A1c    Fatigue  -     Vitamin B12  -     Comprehensive Metabolic Panel  -     HM1(CBC and Differential)  -     Thyroid Stimulating Hormone (TSH)  -     HM1 (CBC with Diff)    COPD (chronic obstructive pulmonary disease)  -     Ambulatory referral to Pulmonology  -     Ambulatory referral to Home Health    Anemia    Other orders  -     carvedilol (COREG) 12.5 MG tablet; Take 1 tablet (12.5 mg total) by mouth 2 (two) times a day with meals.  -     atorvastatin (LIPITOR) 40 MG tablet; Take 1 tablet (40 mg total) by mouth bedtime.  -     losartan (COZAAR) 100 MG tablet; TAKE 1 PILL BY MOUTH DAILY/ TXHUA HNUB NOJ 1 LUB TSHUAJ PAB BON NTSHAV SIAB    Plan to transfuse 2 units  States had EGD    No Follow-up on file.       Anticipatory Guidance and Symptomatic Cares Discussed   Advised to call back directly if there are further questions, or if these symptoms fail to improve as anticipated or worsen.  Return to clinic if patient has a clinical concern that warrants an exam.        25  Min Total Time, > 50% counseling and coordination of Care    Tomas Salazar MD  Family Medicine   [facility] [address] [phone]

## 2021-06-08 NOTE — PROGRESS NOTES
I was consulted by Tomas Salazar to evaluate this patients gall bladder.    HPI: Nav Laurent is a 76 y.o. female who has been experiencing some problems with abdominal pain. she has been noting this for about 1 month.  The pain is in the med epigastrium.  It has been occurring about 2 times per week. It is associated with nausea but not vomiting.      She has a history of PUD disease and had a gastric surgery about 10 yrs ago when she lived in [place].    Allergies:Review of patient's allergies indicates no known allergies.    Past Medical History   Diagnosis Date     Alternating esotropia      Anemia      Atherosclerosis      CHF (congestive heart failure)      COPD (chronic obstructive pulmonary disease)      Coronary artery disease      Diabetes mellitus      Dysphagia      Dyspnea      GERD (gastroesophageal reflux disease)      Hyperlipidemia      Hypertension      Ischemic cardiomyopathy      Murmur      Nonproliferative diabetic retinopathy      Occult blood positive stool 05/29/2014     Stenosis of larynx      Stroke      Vitamin D deficiency        Past Surgical History   Procedure Laterality Date     Tracheostomy         CURRENT MEDS:    Current Outpatient Prescriptions:      acetaminophen 500 mg coapsule, Take 2 capsules by mouth twice daily as needed for back pain, Disp: 120 capsule, Rfl: 0     albuterol (PROVENTIL) 2.5 mg /3 mL (0.083 %) nebulizer solution, Take 3 mL (2.5 mg total) by nebulization every 6 (six) hours as needed for wheezing., Disp: 75 mL, Rfl: 12     ALCOHOL PREP PADS PadM, USE FOUR TIMES A DAY AS DIRECTED/ SIV 4 ZAUG IB HNUB LI TUS ELIZABETH MOB QHIA, Disp: 200 each, Rfl: 10     aspirin 325 MG EC tablet, Take 1 tablet (325 mg total) by mouth daily., Disp: 90 tablet, Rfl: 2     atorvastatin (LIPITOR) 40 MG tablet, TAKE 1 PILL BY MOUTH EVERY DAY/ TXHUA HNUB NOJ 1 LUB TSHUAJ PAB BON NTSV MUAJ ROJ, Disp: 90 tablet, Rfl: 3     bisacodyl (DULCOLAX, BISACODYL,) 5 mg EC tablet, Take 2 tablets (10  mg total) by mouth daily as needed for constipation., Disp: 30 tablet, Rfl: 1     blood glucose test strips, Test blood sugars four times daily -- before meals and bedtime, Disp: 200 each, Rfl: 11     blood pressure monitor Kit, Check blood pressure daily, Disp: 1 each, Rfl: 0     carboxymethylcellulose (REFRESH LIQUIGEL) 1 % ophthalmic solution, 1 to 2 drops affected eye as needed for dryness, Disp: 30 mL, Rfl: 12     carvedilol (COREG) 12.5 MG tablet, TAKE 1 PILL BY MOUTH 2 TIMES EVERYDAY/ TXHUA HNUB NOJ 1 LUB 2 ZAUG PAB BON NTSHAV SIAB, Disp: 60 tablet, Rfl: 11     cetirizine (ZYRTEC) 10 MG tablet, Take 1 tablet (10 mg total) by mouth daily. As needed for allergy symptoms stuffy nose and itchy eyes, Disp: , Rfl: 0     ferrous sulfate 325 (65 FE) MG tablet, TAKE 1 TABLET BY MOUTH DAILY WITH BREAKFAST/ THAUM SAWV NTXOV NOJ 1 LUB [facility] NTXIV NTSHAV LIAB, Disp: 100 tablet, Rfl: 3     HYDROcodone-acetaminophen 5-325 mg per tablet, Take 1 tablet by mouth every 4 (four) hours as needed for pain., Disp: 15 tablet, Rfl: 0     ipratropium-albuterol (COMBIVENT RESPIMAT)  mcg/actuation Mist inhaler, Inhale 1 puff 4 (four) times a day., Disp: 1 Inhaler, Rfl: 11     LANTUS SOLOSTAR 100 unit/mL (3 mL) pen, Inject 12 Units under the skin bedtime. E11.39, Disp: 15 mL, Rfl: 4     losartan (COZAAR) 100 MG tablet, TAKE 1 PILL BY MOUTH DAILY/ TXHUA HNUB NOJ 1 LUB TSHUAJ PAB [facility] NTSHAV SIAB, Disp: 30 tablet, Rfl: 5     nitroglycerin (NITROSTAT) 0.4 MG SL tablet, Place 1 tablet (0.4 mg total) under the tongue every 5 (five) minutes as needed for chest pain., Disp: 90 tablet, Rfl: 3     nitroglycerin (NITROSTAT) 0.4 MG SL tablet, Place 1 tablet (0.4 mg total) under the tongue every 5 (five) minutes as needed for chest pain., Disp: 30 tablet, Rfl: 12     NOVOLOG FLEXPEN 100 unit/mL injection pen, Inject 6 units before lunch and dinner.  E11.39, Disp: 15 mL, Rfl: 4     omeprazole (PRILOSEC) 20 MG capsule, Take 1 capsule  "(20 mg total) by mouth 2 (two) times a day before meals., Disp: 60 capsule, Rfl: 3     pen needle, diabetic (NOVOFINE 32) 32 gauge x 1/4\" Ndle, 32 G x 6 MM - Used to inject Novolog and Lantus insulin. Need 4 needles per day. E11.39, Disp: 150 each, Rfl: 4     sucralfate (CARAFATE) 100 mg/mL suspension, Take 10 mL (1 g total) by mouth 4 (four) times a day., Disp: 420 mL, Rfl: 0    No family history on file.     reports that she has never smoked. She has never used smokeless tobacco. She reports that she does not drink alcohol or use illicit drugs.    Review of Systems:  10 system were reviewed and all are within normal limits except for those listed in the HPI.      EXAM:  Visit Vitals     /64     Pulse 72     Ht 4' 8.5\" (1.435 m)     Wt 113 lb (51.3 kg)     SpO2 95%     BMI 24.89 kg/m2     GENERAL: Very thin elderly female   EYES: Anicteric Sclera,  EOMI  CARDIAC: RRR w/out murmur  CHEST/LUNG: Clear to ascultation, No wheezes  ABDOMEN: Soft with, +Bowel Sounds  NEURO:No focal deficits, ambulatory  LYMPH: No Axillary or inguinal Adenopathy  EXTREMITIES: Ambulatory, No lower extremity deformities      LABS:  Lab Results   Component Value Date    WBC 4.4 01/12/2017    WBC 6.6 07/09/2014    HGB 12.4 01/12/2017    HCT 37.5 01/12/2017    MCV 83 01/12/2017     01/12/2017     INR/Prothrombin Time    Results from last 7 days  Lab Units 01/12/17  1112   LN-SODIUM mmol/L 138   LN-POTASSIUM mmol/L 4.3   LN-CHLORIDE mmol/L 104   LN-CO2 mmol/L 22   LN-BLOOD UREA NITROGEN mg/dL 18   LN-CREATININE mg/dL 1.38*   LN-CALCIUM mg/dL 9.8     Lab Results   Component Value Date    ALT 34 01/12/2017    AST 32 01/12/2017    ALKPHOS 49 01/12/2017    BILITOT 1.0 01/12/2017       IMAGES:   US ABDOMEN LIMITED  1/12/2017 11:47 AM     INDICATION: Abdominal Pain  COMPARISON: None.     FINDINGS:  GALLBLADDER: Cholelithiasis. No signs of cholecystitis.  BILE DUCTS: No bile duct dilation. The common hepatic duct measures 3 mm.  LIVER: " Normal where seen.  RIGHT KIDNEY: No hydronephrosis.  PANCREAS: Not imaged in detail on this limited study. No incidental abnormalities.     Right pleural effusion noted. No ascites in the right upper quadrant.     IMPRESSION:   CONCLUSION:  1. Cholelithiasis.  2. Right pleural effusion.    CT ABDOMEN PELVIS WO ORAL W IV CONTRAST  1/12/2017 12:22 PM      INDICATION: Epigastric pain with nausea and vomiting, not stooling  TECHNIQUE: CT abdomen and pelvis. Multiplanar reformation images (MPR). Dose reduction techniques were used.   IV CONTRAST: Iohexol (Omni) 75mL  COMPARISON: Ultrasound today.     FINDINGS:  LUNG BASES: Moderate right and smaller left pleural effusions. Mild bilateral lung base infiltrates which could represent atelectasis or edema.     ABDOMEN: Diffuse hepatic steatosis. Cholelithiasis. Trace fluid between the gallbladder wall and liver, however no mesenteric stranding adjacent the gallbladder to suggest acute cholecystitis. Aortoiliac atherosclerosis. The mesenteric arteries are   patent. There is orificial stenosis of both renal arteries. The left kidney has cortical scarring, and is smaller than the right.     Normal-appearing spleen, adrenals, and pancreas.     PELVIS: Normal amount stool in colon. No evidence small bowel obstruction. Trace pelvic ascites. No adenopathy.     MUSCULOSKELETAL: Diffuse lumbar disc degeneration and facet arthropathy with multilevel spinal stenosis.     IMPRESSION:   CONCLUSION:  1. Bilateral pleural effusions.  2. Cholelithiasis.  3. Bilateral renal artery atherosclerosis with possible hemodynamically significant lesion in the left renal artery.    Assessment/Plan: Pt with epigastric abdominal pain.  She does have a history of PUD.  We can see gall stones on her CT and US.  I would recommend evaluating her with an EGD.  If there is not evidence of recurrence of her ulcers then I have recommended a cholecystectomy. I discussed with her the plan to do this  laparoscopically understanding the possibility of needing to convert to an open operation. I went over some of the risks of surgery including but not limited to bleeding, infection and bile duct injury. I also discussed the outpatient nature of the surgery and the expected recovery time.         Mitchell Mcbride MD  [phone]  [facility] Department of Surgery

## 2021-06-08 NOTE — ANESTHESIA POSTPROCEDURE EVALUATION
Patient: Nav Laurent  ESOPHAGOGASTRODUODENOSCOPY   Anesthesia type: general    Patient location: Phase II Recovery  Last vitals:   Vitals:    02/07/17 1200   BP: 152/73   Pulse: 80   Resp:    Temp:    SpO2: 95%     Post vital signs: stable  Level of consciousness: awake and responds to simple questions  Post-anesthesia pain: pain controlled  Post-anesthesia nausea and vomiting: no  Pulmonary: unassisted, return to baseline  Cardiovascular: stable and blood pressure at baseline  Hydration: adequate  Anesthetic events: no    QCDR Measures:  ASA# 11 - Shelli-op Cardiac Arrest: ASA11B - Patient did NOT experience unanticipated cardiac arrest  ASA# 12 - Shelli-op Mortality Rate: ASA12B - Patient did NOT die  ASA# 13 - PACU Re-Intubation Rate: NA - No ETT / LMA used for case  ASA# 10 - Composite Anes Safety: ASA10A - No serious adverse event  ASA# 38 - New Corneal Injury: ASA38A - No new exposure keratitis or corneal abrasion in PACU    Additional Notes:

## 2021-06-08 NOTE — ANESTHESIA PREPROCEDURE EVALUATION
Anesthesia Evaluation      Patient summary reviewed     Airway   Mallampati: II   Pulmonary    (+) COPD, shortness of breath, decreased breath sounds,     ROS comment: Old trach scar                         Cardiovascular   (+) hypertension, CAD, CHF, cardiomyopathy,     Rhythm: regular  Rate: normal,         Neuro/Psych    (+) neuromuscular disease,  CVA ,     Endo/Other    (+) diabetes mellitus poorly controlled,      GI/Hepatic/Renal    (+) GERD, esophageal disease,            Dental    (+) edentulous                Chemistry        Component Value Date/Time     01/12/2017 1112    K 4.3 01/12/2017 1112     01/12/2017 1112    CO2 22 01/12/2017 1112    BUN 18 01/12/2017 1112    CREATININE 1.38 (H) 01/12/2017 1112     (H) 01/12/2017 1112        Component Value Date/Time    CALCIUM 9.8 01/12/2017 1112    ALKPHOS 49 01/12/2017 1112    AST 32 01/12/2017 1112    ALT 34 01/12/2017 1112    BILITOT 1.0 01/12/2017 1112        Lab Results   Component Value Date    WBC 4.4 01/12/2017    HGB 12.4 01/12/2017    HCT 37.5 01/12/2017    MCV 83 01/12/2017     01/12/2017     POC glucose 237 (did not take insulin).           Anesthesia Plan  Planned anesthetic: MAC  Ketamine/propofol/versed  ASA 4     Anesthetic plan and risks discussed with: patient,  services used and child/children    Post-op plan: routine recovery

## 2021-06-08 NOTE — ANESTHESIA CARE TRANSFER NOTE
Last vitals:   Vitals:    02/07/17 1153   BP: 157/76   Pulse: 80   Resp: 14   Temp: 36.7  C (98.1  F)   SpO2: 100%     Patient's level of consciousness is drowsy  Spontaneous respirations: yes  Maintains airway independently: yes  Dentition unchanged: yes  Oropharynx: oropharynx clear of all foreign objects    QCDR Measures:  ASA# 20 - Surgical Safety Checklist: ASA20A - Safety Checks Done  PQRS# 430 - Adult PONV Prevention: 4558F - Pt received => 2 anti-emetic agents (different classes) preop & intraop  ASA# 8 - Peds PONV Prevention: NA - Not pediatric patient, not GA or 2 or more risk factors NOT present  PQRS# 424 - Shelli-op Temp Management: 4559F-8P - Body temp not recorded within timeframe  PQRS# 426 - PACU Transfer Protocol: - Transfer of care checklist used  ASA# 14 - Acute Post-op Pain: ASA14B - Patient did NOT experience pain >= 7 out of 10    I completed my SBAR handoff to the receiving nurse per policy and procedure.

## 2021-06-09 NOTE — PROGRESS NOTES
MTM Follow Up Encounter  ASSESSMENT AND PLAN  GERD: Reviewed that she in fact has omeprazole with her, but the other's person's capsules were a different color so she did not realize. I reinforced that she is alwready taking omeprazole in her pill box, so to not take an extra capsule (currently taking 3 capsules daily). Removed the AM omeprazole and recommended that she takes it 30-60 minutes before breakfast. Left the PM omeprazole. Recommended that she not use the omeprazole that is not hers. She was understanding and agreeable.   PLAN:   1. Removed AM omeprazole from pill box. Continues to take AM medications after breakfast  2. Take AM omeprazole 30-60 minutes before breakfast (seperate from other AM medications)  3. Do not use omeprazole that is not yours.      CHF:  stable. Pt is on appropriate evidence-based beta blocker, is on appropriate ACE-I and diuretic therapy. Beta blocker or losartan (decreased dose due to hypotension) are not at a therapeutic dose. Would recommend discussing with Dr. Freed at follow up in March. Last K WNL.     Constipation: Stable. Recommended to continue current regimen.     Anemia: Will check a Hgb today since starting ferrous sulfate and having 2 units of RBC. If Hgb continues to be stable, consider d/cing iron in the future.   PLAN:   1. CBC today.     CAD: Appears to be using nitro quite often. Unclear whether her symptoms are cardiac or respiratory related. Recommend that she discuss with Dr. Freed at follow up.   PLAN:   1. Defer to Dr. Freed regarding frequent use of nitro    COPD: Unclear how often she is using Combivent, considering it is >2 years old and only ~20 puffs have been used. Recommended that she look for her most recent inhaler, but in case I will refill for her. Recommended that she use PRN, which may reduce her feeling of needing nitro so often.   PLAN:   1. Do not use  Combivent. New Combivent rx sent.     Type 2 Diabetes:  borderline controlled. A1C not at  goal of <7%. FBP unclear if goal  mg/dL since she is checking at very random times of the day. Recommended that she check fasting blood sugars. Last microalbuminuria ratio elevated, but will defer to Dr. Freed regarding increasing losartan dose.     MTM FOLLOW UP  1 month    SUBJECTIVE AND OBJECTIVE  Nav Laurent is a 76 y.o. female here for a follow-up medication therapy management (MTM) appointment. A Invinceaong  joins us.     Medication Adherence: brought pill box and medication bottles. Sometimes she forgets. If forgets she will take later that night.   AM pills -- takes after breakfast  PM pills -- takes before dinner    GERD: Reports that she needs a refill of omeprazole for bloating. Does not put in pill box. Only uses prn, not daily. Needs it about 4-5 times a week.   Upon review of her bottles, she had 3 bottles of omeprazole -- one with a different person's name on it. The omeprazole was in her pill box in the AM.     CHF/HTN: Currently taking carvedilol 12.5 + 6.25 mg BID, furosemide 20 mg daily, losartan 100 mg one-half tablet (50 mg) daily, and spironolactone 25 mg daily. No feeling of fluid in chest, swelling, denies lightheadedness/dizziness. Reports home /40 mmHg. Able to lay flat. Furosemide makes her go to the bathroom and it helps with swelling. Denies gynecomastia (spironolactone added Feb 2016).  Last K = 4.4 on 3/6/17     Constipation: Brought a bottle of bisacodyl 5 mg. Reports it is not in her pill box and she is using PRN. Regular BMs lately.     Anemia: Currently taking ferrous sulfate 325 mg daily. Received 2 units of RBC on 2/21/17.   Last Hgb = 9.6 on 2/20/17.     CAD: Reports that she uses nitro when out of breath or hard to breath, helps. No chest pain but coughing, wheezing. Ongoing for 10 years she would feel pain in chest and make her neck tight. Not as much anymore. Gets a mild version every day, 1-2 times daily. If overexerts she feels tightness, they dont know what  is causing it. Uses nitro 2-3 times a day if overexerts but lately she reports she has not been taking it.     COPD: Brought Combivent Respimat with her.  in  and over 100 puffs remain. Reports that she uses it  when out of breath as needed. Some days takes multiple times a day. Reports that the albuterol HFA is harder to use. Thinks she has another Combivent at home.      Type 2 Diabetes: Currently taking Lantus 8 units daily and Novolog 3 units BID before meals. Tests BG 1-3 times daily. Blood sugars per glucometer:   3/17: 152 (1030am), 246 (1am)  3/16: 138 (1:30pm)  3/15: 214 (12am)  3/14: 104 (11am)  3/13: 259 (830pm), 306 (2pm)  3/12: 175 (930pm), 76 (1pm)  3/11: 161 (11pm), 146 (430pm), 222 (2am)  3/9: 209 (9pm), 105 (11am)  3/8: 248, 139 (10:30pm)  3/7: 206 (9pm)  3/5: 183 (145am)  Last A1c checked 17 = 7.9%  No signs of hypoglycemia. Sometimes when too much insulin she gets shaky, but she is trying to maintain her blood sugars by eating.  Using a OneTouch Ultra 2 meter.   Microalbumin checked 17 ratio = 122.3    APAP capsules easier to swallow.             Nav's medication list was reviewed with them, discussing reason for use, directions for use, and potential side effects of each medication as needed. Indication, safety, efficacy, and convenience was assessed for all medications addressed above.  No environmental factors were noted currently affecting patient.  This care plan was communicated via EMR with her primary care provider, Tomas Salazar MD, who is the authorizing prescriber for this visit.  Direct supervision was available by either the patient's PCP or other available provider.    Time and complexity billing metrics are included in the docflowsheet linked to this visit    Time spent: 30 min  Jessica Kaplan, Pharm.D., BCACP   MTM Pharmacist at [facility]

## 2021-06-09 NOTE — PROGRESS NOTES
Pt arrived via w/c and was assisted to chair 11 - accompanied by her granddaughter and the  (German Edwards). With the  - educated pt on receiving a transfusion, potential s/sx of a reaction, and the consent was signed. At time of IV placement the type and crossmatch was drawn. Pt's hgb was 9.6 on 2/20/17, with a history of cardiomyopathy. NS was used as the primary IV solution - flushed the IV line between units and administered lasix 20 mg IVP. VSS. Assisted to the bathroom x 2. Ate 90% of lunch. The  (Jeet) was present at time of discharge instructions. Post transfusion AVS was explained and given to the pt (and her granddaughter) - instructed to call Dr. Salazar with any questions or concerns. Writer took pt in stable condition via w/c to awaiting vehicle at 14:10.    Antonella Angelo RN

## 2021-06-09 NOTE — PROGRESS NOTES
Assessment/Plan:     1. Ischemic cardiomyopathy with systolic dysfunction, NYHA class II: Nav Laurent appears well compensated.  We discussed heart failure, following a low salt diet, monitoring weights, and heart failure treatment.  I increased her carvedilol to 18.75 mg twice a day.  I encouraged her to call the clinic if she experiences any dizziness or lightheadedness.  BMP pending.  We also discussed possible device implantation.  She is not wanting this due to her age and is okay with the thought of passing away.  She met with a heart failure nurse clinician to discuss heart failure management.        Heart failure treatment includes:  - Beta blocker therapy with carvedilol 18.75 mg twice a day  - ARB therapy with losartan 100 mg daily  - Aldosterone blocker therapy with spironolactone 25 mg daily.  BMP pending.  - Diuretic therapy with furosemide 20 mg daily    Follow-up with Dr. Freed on March 27 as scheduled and in the heart failure clinic in 6 weeks    Subjective:     Nav Laurent is seen at [facility] heart failure clinic today for post-hospitalization follow-up.  There is an  for the duration of the appointment.  She was hospitalized at [facility] from February 8 - February 11, 2017 with abdominal bloating and dyspnea.  Her BNP on admission was 2168.  She received IV Lasix during hospitalization which improved her symptoms.  The most recent evaluation of Her ejection fraction was 19% from an  echo on 2/9/2017.  Her echocardiogram also showed mild mitral regurgitation.  Her heart failure is not a new diagnosis.  In 2014 her ejection fraction was 20%.  Her past medical history is also significant for hypertension, coronary artery disease, dyslipidemia, COPD, diabetes and chronic kidney disease.  She has a history of an MI in which she received a bare-metal stent to the LAD.    Since being discharged from the hospital, Nav feels that she is improving.  She states her shortness of breath  and abdominal bloating have improved since discharge.  She continues to feel weak and have dyspnea on exertion.  She denies any other acute heart failure symptoms.  She denies fatigue, lightheadedness, shortness of breath, orthopnea, PND, palpitations, chest pain and abdominal fullness/bloating.      Nav danielson weight at discharge was 103 pounds.  She is not monitoring home weights due to not being able to see the numbers on the scale.  Her clinic weight today was 107 pounds.She is following a low sodium diet.  She has a meal service that provides her 7 meals a week.  She participates in regular physical activity including walking.      Medication reconciliation was done today.    Review of Systems:   General: Weight Loss  Eyes: Visual Distubance  Ears/Nose/Throat: WNL  Lungs: WNL  Heart: WNL  Stomach: WNL  Bladder: WNL  Muscle/Joints: WNL     Nervous System: Dizziness, Loss of Balance  Mental Health: WNL     Blood: WNL    Patient Active Problem List   Diagnosis     Atherosclerosis     Alternating Esotropia     Chronic Obstructive Pulmonary Disease     Murmurs     Vitamin D Deficiency     Hyperlipidemia     Nonproliferative Retinopathy     Acute Myocardial Infarction     Coronary Artery Disease     Ischemic Cardiomyopathy     Congestive Heart Failure     Ischemic Stroke  left side weakness arms/legs     Stenosis Of Larynx     Esophageal Reflux     Anemia     Acute on chronic diastolic CHF (congestive heart failure), NYHA class 3     Hyperlipidemia     CKD (chronic kidney disease) stage 3, GFR 30-59 ml/min     ACS (acute coronary syndrome)     Iron deficiency anemia     Myofascial pain syndrome     Lumbago     Joint Pain, Localized in the Shoulder      Cervicalgia     Acute on chronic systolic CHF (congestive heart failure), NYHA class 3     Hypertension     Pain of upper abdomen     Type 2 diabetes mellitus with ophthalmic manifestations     Bruit of right carotid artery     Elevated troponin     Calculus of  gallbladder without cholecystitis     Coronary artery disease involving native coronary artery of native heart without angina pectoris       Past Medical History:   Diagnosis Date     Alternating esotropia      Anemia      Atherosclerosis      CHF (congestive heart failure)      COPD (chronic obstructive pulmonary disease)      Coronary artery disease     status post MI times two with BMS to LAD     Diabetes mellitus      Dysphagia      Dyspnea      GERD (gastroesophageal reflux disease)      Hyperlipidemia      Hypertension      Ischemic cardiomyopathy      Ischemic cardiomyopathy     EF 20-25% - follows with Dr. Freed     Murmur      Nonproliferative diabetic retinopathy      Occult blood positive stool 05/29/2014     Stenosis of larynx      Stroke      Vitamin D deficiency        Past Surgical History:   Procedure Laterality Date     CARDIAC CATHETERIZATION       ESOPHAGOGASTRODUODENOSCOPY N/A 2/7/2017    Procedure: ESOPHAGOGASTRODUODENOSCOPY ;  Surgeon: Mitchell Mcbride MD;  Location: [facility];  Service:      TRACHEOSTOMY         History reviewed. No pertinent family history.    Social History     Social History     Marital status:      Spouse name: N/A     Number of children: N/A     Years of education: N/A     Occupational History     Not on file.     Social History Main Topics     Smoking status: Never Smoker     Smokeless tobacco: Never Used     Alcohol use No     Drug use: No     Sexual activity: Not Currently     Other Topics Concern     Not on file     Social History Narrative    Lives with family       Current Outpatient Prescriptions   Medication Sig Dispense Refill     acetaminophen 500 mg coapsule Take 2 capsules by mouth twice daily as needed for back pain (Patient taking differently: Take 1,000 mg by mouth 2 (two) times a day as needed for pain (back pain). ) 120 capsule 0     albuterol (PROVENTIL) 2.5 mg /3 mL (0.083 %) nebulizer solution Take 3 mL (2.5 mg total) by nebulization  every 6 (six) hours as needed for wheezing. 75 mL 12     alcohol swabs (ALCOHOL PREP PADS) PadM USE FOUR TIMES A DAY AS DIRECTED/ SIV 4 ZAUG IB HNUB LI TUS ELIZBAETH MOB QHIA 200 each 10     aspirin 325 MG EC tablet Take 1 tablet (325 mg total) by mouth daily. 100 tablet 2     atorvastatin (LIPITOR) 40 MG tablet Take 1 tablet (40 mg total) by mouth bedtime. 90 tablet 1     bisacodyl (DULCOLAX, BISACODYL,) 5 mg EC tablet Take 2 tablets (10 mg total) by mouth daily as needed for constipation. 30 tablet 1     blood glucose test strips Test blood sugars four times daily -- before meals and bedtime 200 each 11     blood pressure monitor Kit Check blood pressure daily 1 each 0     carboxymethylcellulose (REFRESH LIQUIGEL) 1 % ophthalmic solution 1 to 2 drops affected eye as needed for dryness (Patient taking differently: Apply 1-2 drops to eye as needed. 1 to 2 drops affected eye as needed for dryness) 30 mL 12     carvedilol (COREG) 12.5 MG tablet Take 1 tablet (12.5 mg total) by mouth 2 (two) times a day with meals. 180 tablet 1     cetirizine (ZYRTEC) 10 MG tablet Take 1 tablet (10 mg total) by mouth daily. As needed for allergy symptoms stuffy nose and itchy eyes (Patient taking differently: Take 10 mg by mouth daily as needed. As needed for allergy symptoms stuffy nose and itchy eyes)  0     ferrous sulfate 325 (65 FE) MG tablet Take 1 tablet by mouth daily with breakfast.       furosemide (LASIX) 20 MG tablet Take 1 tablet (20 mg total) by mouth daily. 90 tablet 1     HYDROcodone-acetaminophen 5-325 mg per tablet Take 1 tablet by mouth every 4 (four) hours as needed for pain. 15 tablet 0     ipratropium-albuterol (COMBIVENT RESPIMAT)  mcg/actuation Mist inhaler Inhale 1 puff 4 (four) times a day. 1 Inhaler 11     LANTUS SOLOSTAR 100 unit/mL (3 mL) pen Inject 8 Units under the skin bedtime. E11.39 15 mL 4     losartan (COZAAR) 100 MG tablet TAKE 1 PILL BY MOUTH DAILY/ TXHUA HNUB NOJ 1 LUB TSHUAJ PAB BON NTSHAV SIAB  "90 tablet 1     MAPAP EXTRA STRENGTH 500 mg tablet   3     nitroglycerin (NITROSTAT) 0.4 MG SL tablet Place 1 tablet (0.4 mg total) under the tongue every 5 (five) minutes as needed for chest pain. 90 tablet 3     NOVOLOG FLEXPEN 100 unit/mL injection pen Inject 3 Units under the skin 2 (two) times daily before lunch and supper. 15 mL 4     omeprazole (PRILOSEC) 20 MG capsule Take 1 capsule (20 mg total) by mouth 2 (two) times a day before meals. 60 capsule 3     pen needle, diabetic (NOVOFINE 32) 32 gauge x 1/4\" Ndle 32 G x 6 MM - Used to inject Novolog and Lantus insulin. Need 4 needles per day. E11.39 150 each 4     spironolactone (ALDACTONE) 25 MG tablet Take 1 tablet (25 mg total) by mouth daily. 90 tablet 3     carvedilol (COREG) 6.25 MG tablet Take 1 tablet (6.25 mg total) by mouth 2 (two) times a day with meals. Take a total dose of 18.75 mg twice a day 60 tablet 3     No current facility-administered medications for this visit.        No Known Allergies    Objective:     Vitals:    03/06/17 1527   BP: 116/70   Pulse: 75   SpO2: 97%     Wt Readings from Last 3 Encounters:   03/06/17 107 lb 4.8 oz (48.7 kg)   03/01/17 107 lb 12.8 oz (48.9 kg)   02/17/17 111 lb 8 oz (50.6 kg)       General Appearance:   Alert, cooperative and in no acute distress.   HEENT:  No scleral icterus; the mucous membranes were pink and moist.   Neck: neck vein assessment limited as patient was examined in the chair due to safety concerns of getting on the exam table   Chest: The spine was straight. The chest was symmetric.   Lungs:   Respirations unlabored; the lungs are clear to auscultation.   Cardiovascular:   Regular rhythm. S1 and S2 without murmur, clicks or rubs. Radial and posterior tibial pulses are intact and symmetrical.    Abdomen:  Soft, nontender, nondistended, bowel sounds present   Extremities: No cyanosis. Trace bilateral lower extremity edema.   Skin: No xanthelasma.   Neurologic: Mood and affect are appropriate.     "     Lab Review   BMP pending          Cardiographics  Echocardiogram: 2/9/2017  Summary     Left ventricle ejection fraction is severely decreased. The calculated left ventricular ejection fraction is 19%.    Akinesis involving the entire anterior, anteroseptal, septal and apical segments    Mild mitral regurgitation    Pleural effusion is present           40 minutes were face to face spent with the patient with greater than 50% spent on education and counseling.      Amira South, [facility], [place]   Heart Failure Clinic

## 2021-06-09 NOTE — PROGRESS NOTES
"Heart failure Education for Tandem Visits    Patient seen in clinic for HF education s/p recent hospital discharge 02/08/17.  Reviewed \"HF Sx Awareness & Action Plan\" handout and \"A Stronger Pump\" booklet highlighting:  patient's type of heart failure, importance of daily wts, Na management in diet, Fluid Guidelines, if applicable and medication review and importance of compliance  Instructed patient in signs and sx of heart failure, reiterated when to call clinic - reviewed HF hotline # and after hours call #.  Patient verbalized understanding of HF discussion.  Plan for f/u with continued education reviewed.  No formal follow up scheduled with nurse clinician for continued edication - will continue to reinforce HF management education.    Adorable davionong pt here today with . She lives with her son who helps with meal prep. Has a scale but unable to see as sight is poor. Son speaks and reads english, will assist the pt.       "

## 2021-06-09 NOTE — PROGRESS NOTES
DIABETES EDUCATION CARE PLAN    Assessment/Plan:     Follow-up visit for diabetes education and counseling. Nav is testing her blood sugar 1-3 times per day. The 30 day blood sugar average is 170 (52 tests). Noted her last A1c decreased. Her blood sugars are close to target when she takes her insulin as prescribed. Once or twice per week she stays overnight at someone's house and does not bring her insulin. On the days she forgets her insulin her blood sugar spikes above 300. Reinforced the importance of taking her insulin with when she leaves the house. Reviewed symptoms and treatment of hyperglycemia. Her appetite is poor due to fluid retention from congestive heart failure. Weight down 10 pounds in the past month.    Diabetes medications:   Lantus Solostar 8 units at bedtime  Novolog FlexPen 3-4 units before each meal (3 meals per day)    Plan:  1. Test blood sugar 3 times per day (before breakfast, before dinner and bedtime).  2. Continue current insulin doses.  3. Bring insulin when you are staying away from home.  4. Make an appointment with Dianne Lopez today for mid April.  5. Follow-up with me as needed.    Subjective/Objective:      Nav Laurent is a 76 y.o. female referred by Tomas Salazar MD.  Accompanied by , Chintan    Wt Readings from Last 3 Encounters:   03/21/17 101 lb (45.8 kg)   03/15/17 100 lb (45.4 kg)   03/06/17 107 lb 4.8 oz (48.7 kg)       Lab Results   Component Value Date    HGBA1C 7.9 (H) 02/17/2017     Diet/Eating Habits: 3 meals per day and fruit for snacks    Physical Activity: Walking slowly. Limited due to CHF, poor balance. Walks with a cane    SMBG pattern/BG ranges: Uses One Touch Ultra 2 meter  Tests 1-3 times per day  Blood sugar record scanned    Hypoglycemia: none    Blood sugar goals: -130; Bedtime: 120-140    EDUCATION RECORD     Monitoring   Meter (per above goals): Competent  Monitoring: Competent  BG goals: Assessed, Discussed and  Competent    Nutrition Management  Nutrition Management: Discussed  Weight: Discussed  Portions/Balance: Assessed and Discussed  Physical Activity: Assessed and Discussed  Injected Medications: Assessed, Discussed and Competent   Storage/Exp:Competent   Site Rotation: Competent     Acute Complications: Prevent, Detect, Treat:  Hypoglycemia: Assessed, Discussed and Competent  Hyperglycemia: Assessed, Discussed and Competent    Chronic Complications  Foot Care:Competent  Skin Care: Competent  Eye: Competent  ABC: Discussed and Competent  Teeth:Competent  Goal Setting and Problem Solving: Assessed, Discussed and Literature provided  Barriers: Assessed and Language (Hmong), low vision, hearing loss  Psychosocial Adjustments: Assessed    Time spent with the patient: 30 minutes  Previous Education: yes  Visit Type: Medical Nutrition Therapy, Initial (62090)  Hours Remaining: DSMT 2 and MNT 1.5 for 3017  Diagnosis per referral:Type 2 diabetes, uncontrolled with ophthalmic manifestations, with long-term use of insulin (E11.65, E11.39, Z79.4)    Ashley Gordillo RD, LD, CDE  3/21/2017  10:14 AM

## 2021-06-10 NOTE — PROGRESS NOTES
CHIEF COMPLAINT: Nav Laurent had concerns including Eye Trauma; Medication Refill; and Back Pain.    Pauma: 1.............. had concerns including Eye Trauma; Medication Refill; and Back Pain.    1. Diabetes    2. Anemia    3. Sacroiliac dysfunction    4. Ischemic Cardiomyopathy    5. Essential hypertension      No problem-specific Assessment & Plan notes found for this encounter.      CC:              Left eye injury. Pt requesting eye drops that was prescribe before     What's it like:      Pt states feels like something is in eye    How long is it ongoing:    Eye injury 1x week ago   What makes it worse :    Nothing makes it worse  What makes it better:      Eye paste helped but can't apply, pt stopped med from ER.   0/10-10/10:Pain/Intesity     1      Take for more Blood?      Associated Sx:   Pt states loss of appetite, requesting ensure drinks for extra boost of nutrition. Pt also states back pain, requesting injections for back last time you gave injections in knees and helped a lot.      SUBJECTIVE:  Nav Laurent is a 76 y.o. female    Past Medical History:   Diagnosis Date     Alternating esotropia      Anemia      Atherosclerosis      CHF (congestive heart failure)      COPD (chronic obstructive pulmonary disease)      Coronary artery disease     status post MI times two with BMS to LAD     Diabetes mellitus      Dysphagia      Dyspnea      GERD (gastroesophageal reflux disease)      Hyperlipidemia      Hypertension      Ischemic cardiomyopathy      Ischemic cardiomyopathy     EF 20-25% - follows with Dr. Freed     Murmur      Nonproliferative diabetic retinopathy      Occult blood positive stool 05/29/2014     Stenosis of larynx      Stroke      Vitamin D deficiency      Past Surgical History:   Procedure Laterality Date     CARDIAC CATHETERIZATION       ESOPHAGOGASTRODUODENOSCOPY N/A 2/7/2017    Procedure: ESOPHAGOGASTRODUODENOSCOPY ;  Surgeon: Mitchell Mcbride MD;  Location: [facility];  Service:       TRACHEOSTOMY       Review of patient's allergies indicates no known allergies.  Current Outpatient Prescriptions   Medication Sig Dispense Refill     acetaminophen 500 mg coapsule Take 2 capsules by mouth twice daily as needed for back pain (Patient taking differently: Take 1,000 mg by mouth 2 (two) times a day as needed for pain (back pain). ) 120 capsule 0     albuterol (PROVENTIL) 2.5 mg /3 mL (0.083 %) nebulizer solution Take 3 mL (2.5 mg total) by nebulization every 6 (six) hours as needed for wheezing. 75 mL 12     alcohol swabs (ALCOHOL PREP PADS) PadM USE FOUR TIMES A DAY AS DIRECTED/ SIV 4 ZAUG IB HNUB LI TUS ELIZABETH MOB QHIA 200 each 10     aspirin 325 MG EC tablet Take 1 tablet (325 mg total) by mouth daily. 100 tablet 2     atorvastatin (LIPITOR) 40 MG tablet Take 1 tablet (40 mg total) by mouth bedtime. 90 tablet 1     bisacodyl (DULCOLAX, BISACODYL,) 5 mg EC tablet Take 2 tablets (10 mg total) by mouth daily as needed for constipation. 30 tablet 1     blood glucose test strips Test blood sugars four times daily -- before meals and bedtime 200 each 11     blood pressure monitor Kit Check blood pressure daily 1 each 0     carvedilol (COREG) 12.5 MG tablet Take 1 tablet (12.5 mg total) by mouth 2 (two) times a day with meals. 180 tablet 1     carvedilol (COREG) 6.25 MG tablet Take 1 tablet (6.25 mg total) by mouth 2 (two) times a day with meals. Take a total dose of 18.75 mg twice a day 60 tablet 3     ferrous sulfate 325 (65 FE) MG tablet Take 1 tablet by mouth daily with breakfast.       furosemide (LASIX) 20 MG tablet Take 20 mg by mouth daily.       ipratropium-albuterol (COMBIVENT RESPIMAT)  mcg/actuation Mist inhaler Inhale 1 puff 4 (four) times a day. 1 Inhaler 11     LANTUS SOLOSTAR 100 unit/mL (3 mL) pen Inject 8 Units under the skin bedtime. E11.39 15 mL 4     losartan (COZAAR) 100 MG tablet Take 50 mg by mouth daily. Indications: Chronic Heart Failure       nitroglycerin (NITROSTAT) 0.4  "MG SL tablet Place 1 tablet (0.4 mg total) under the tongue every 5 (five) minutes as needed for chest pain. 90 tablet 3     NOVOLOG FLEXPEN 100 unit/mL injection pen Inject 3 Units under the skin 2 (two) times daily before lunch and supper. 15 mL 4     omeprazole (PRILOSEC) 20 MG capsule Take 1 capsule (20 mg total) by mouth 2 (two) times a day before meals. 60 capsule 3     pen needle, diabetic (NOVOFINE 32) 32 gauge x 1/4\" Ndle 32 G x 6 MM - Used to inject Novolog and Lantus insulin. Need 4 needles per day. E11.39 150 each 4     spironolactone (ALDACTONE) 25 MG tablet Take 1 tablet (25 mg total) by mouth daily. 90 tablet 3     carboxymethylcellulose (REFRESH LIQUIGEL) 1 % ophthalmic solution 1 to 2 drops affected eye as needed for dryness (Patient taking differently: Apply 1-2 drops to eye as needed. 1 to 2 drops affected eye as needed for dryness) 30 mL 12     food supplemt, lactose-reduced (ENSURE ACTIVE HEART HEALTH) Liqd 1 can 3 times daily  (Vanilla) 90 Bottle 12     No current facility-administered medications for this visit.      No family history on file.  Social History     Social History     Marital status:      Spouse name: N/A     Number of children: N/A     Years of education: N/A     Social History Main Topics     Smoking status: Never Smoker     Smokeless tobacco: Never Used     Alcohol use No     Drug use: No     Sexual activity: Not Currently     Other Topics Concern     None     Social History Narrative    Lives with family     Patient Active Problem List   Diagnosis     Atherosclerosis     Alternating Esotropia     Chronic Obstructive Pulmonary Disease     Murmurs     Vitamin D Deficiency     Mixed hyperlipidemia     Nonproliferative Retinopathy     Acute Myocardial Infarction     Coronary Artery Disease     Ischemic Cardiomyopathy     Congestive Heart Failure     Ischemic Stroke  left side weakness arms/legs     Stenosis Of Larynx     Esophageal Reflux     Anemia     Acute on chronic " diastolic CHF (congestive heart failure), NYHA class 3     Hyperlipidemia     CKD (chronic kidney disease) stage 3, GFR 30-59 ml/min     ACS (acute coronary syndrome)     Iron deficiency anemia     Myofascial pain syndrome     Lumbago     Joint Pain, Localized in the Shoulder      Cervicalgia     Acute on chronic systolic CHF (congestive heart failure), NYHA class 3     Hypertension     Pain of upper abdomen     Type 2 diabetes mellitus with ophthalmic manifestations     Bruit of right carotid artery     Elevated troponin     Calculus of gallbladder without cholecystitis     Coronary artery disease involving native coronary artery of native heart without angina pectoris                                              SOCIAL: She  reports that she has never smoked. She has never used smokeless tobacco. She reports that she does not drink alcohol or use illicit drugs.    REVIEW OF SYSTEMS:     Family reviewed and not pertinent to presenting issue   Review of systems otherwise negative as requested from patient, except   Positive ROS outlined and discussed in [place].    OBJECTIVE:  /72 (Patient Site: Left Arm, Patient Position: Sitting, Cuff Size: Adult Regular)  Pulse 68  Resp 16  Wt 101 lb 4 oz (45.9 kg)  SpO2 99%  Breastfeeding? No  BMI 22.3 kg/m2    GENERAL:     No acute distress.   Alert and oriented X 3         Physical:  The tropia bilaterally  No scleral injection no no appreciable foreign body no appreciable corneal haziness  Left eye 3 mm pupil  Lateral tenderness to palpation SI joints  Slight weakness with dorsiflexion of left foot which is old  Flexion extension the knee 5/5  No lower extremity edema  Normal foot exam normal monofilament testing  Normal distal pulses  Cardiac exam S1-S2 positive S3    Normal monofilament exam      Recent Results (from the past 240 hour(s))   Glycosylated Hemoglobin A1c   Result Value Ref Range    Hemoglobin A1c 8.5 (H) 3.5 - 6.0 %   HM1 (CBC with Diff)   Result  Value Ref Range    WBC 5.7 4.0 - 11.0 thou/uL    RBC 4.60 3.80 - 5.40 mill/uL    Hemoglobin 12.9 12.0 - 16.0 g/dL    Hematocrit 39.0 35.0 - 47.0 %    MCV 85 80 - 100 fL    MCH 28.0 27.0 - 34.0 pg    MCHC 33.0 32.0 - 36.0 g/dL    RDW 14.6 (H) 11.0 - 14.5 %    Platelets 203 140 - 440 thou/uL    MPV 8.7 7.0 - 10.0 fL    Neutrophils % 53 50 - 70 %    Lymphocytes % 31 20 - 40 %    Monocytes % 11 (H) 2 - 10 %    Eosinophils % 5 0 - 6 %    Basophils % 1 0 - 2 %    Neutrophils Absolute 3.0 2.0 - 7.7 thou/uL    Lymphocytes Absolute 1.8 0.8 - 4.4 thou/uL    Monocytes Absolute 0.6 0.0 - 0.9 thou/uL    Eosinophils Absolute 0.3 0.0 - 0.4 thou/uL    Basophils Absolute 0.0 0.0 - 0.2 thou/uL       ASSESSMENT & PLAN      Nav was seen today for eye trauma, medication refill and back pain.    Diagnoses and all orders for this visit:    Diabetes  -     Glycosylated Hemoglobin A1c    Anemia  -     HM1(CBC and Differential)  -     HM1 (CBC with Diff)    Sacroiliac dysfunction  -     Ambulatory referral to [facility]    Ischemic Cardiomyopathy    Essential hypertension    Other orders  -     Varicella-zoster vaccine subq; Standing  -     Pneumococcal conjugate vaccine 13-valent 6wks-17yrs; >50yrs; Standing  -     food supplemt, lactose-reduced (ENSURE ACTIVE HEART HEALTH) Liqd; 1 can 3 times daily  (Vanilla)      No Follow-up on file.       Anticipatory Guidance and Symptomatic Cares Discussed   Advised to call back directly if there are further questions, or if these symptoms fail to improve as anticipated or worsen.  Return to clinic if patient has a clinical concern that warrants an exam.        25 Min Total Time, > 50% counseling and coordination of Care    Tomas Salazar MD  Family Medicine   [facility] [id number] [phone]

## 2021-06-10 NOTE — PROGRESS NOTES
ASSESSMENT: Nav Laurent is a 76 y.o. female who presents for consultation at the request of HE PCP Tomas Salazar MD, with a past medical history significant for Chronic COPD, vitamin D deficiency, CAD, Acute MI, Stroke 2009 with chronic Left sided weakness, Reflux, stage 3 CKD, hyperlipidemia, iron deficiency anemia, HTN, Type 2 diabetes, who presents today for new patient evaluation of ongoing chronic bilateral low back pain the lumbosacral junction as well as generalized thoracic pain and ongoing chronic neck pain for the last 5-6 years with no known injury.    Patient is neurologically intact on exam, no significant SI joint or facet loading noted on exam.    NIRAV: 74%    WHO-5: 12    PHQ-2: 6    Diagnoses and all orders for this visit:    Chronic bilateral low back pain without sciatica  -     XR Lumbar Spine 2 or 3 VWS; Future; Expected date: 4/24/17  -     Ambulatory referral to PT/OT  -     gabapentin (NEURONTIN) 100 MG capsule; Take 100 mg by mouth 3 (three) times a day. Follow Gabapentin Dosing chart given  Dispense: 90 capsule; Refill: 3    Chronic bilateral thoracic back pain  -     XR Thoracic Spine 3 VWS; Future; Expected date: 4/24/17  -     Ambulatory referral to PT/OT    Neck pain, bilateral  -     XR Cervical Spine 2 - 3 VWS; Future; Expected date: 4/24/17  -     Ambulatory referral to PT/OT       PLAN:  Reviewed spine anatomy and disease process. Discussed diagnosis and treatment options with the patient today. A shared decision making model was used.  The patient's values and choices were respected. The following represents what was discussed and decided upon by the provider and the patient.      -DIAGNOSTIC TESTS: Reviewed cervical trial with patient today.  Did order cervical, thoracic, lumbar x-ray to further elevuate.    -PHYSICAL THERAPY: Referral to physical therapy sent for further strengthening and possible myofascial release, sent to renew PT on [address] per patient request.   Discussed the importance of core strengthening, ROM, stretching exercises with the patient and how each of these entities is important in decreasing pain.  Explained to the patient that the purpose of physical therapy is to teach the patient a home exercise program.  These exercises need to be performed every day in order to decrease pain and prevent future occurrences of pain.        -MEDICATIONS: Tried low-dose gabapentin 100 mg titrating up to 1 tablet 3 times a day as tolerated for pain.  Discussed side effects of medications and proper use. Patient verbalized understanding.    -INTERVENTIONS: No recommendations for injections at this time.    -PATIENT EDUCATION:  45 minutes of total visit time was spent face to face with the patient today, 60 % of the visit was spent on counseling, education, and coordinating care.     -FOLLOW-UP:   Follow-up in 6 weeks, sooner pain is worsening or new symptoms arise.    Advised pt to call the [facility] if symptoms worsen or you have problems controlling bladder and bowel function.   ______________________________________________________________________    SUBJECTIVE:  HPI:  Nav Laurent  Is a 76 y.o. female who presents today for new patient evaluation of low back pain across the lumbosacral junction that is her biggest concern currently pain is a 4/10 that is more bothersome with bending or lifting however she also has pain into her entire thoracic pain as well as cervical spine that is slightly worse on the left ongoing for the last 5-6 years with no known injury.  Patient denies any radicular arm or leg symptoms, denies numbness or tingling, denies weakness, denies recent trips or falls or balance changes.    Patient does endorse upper and lower extremity weakness on the left that is chronic in nature due to a stroke in 2009, no acute changes.    Treatment to Date: Prior chiropractic manipulation and acupuncture with some relief however due to cost unable to continue.  No  prior physical therapy.  No prior spinal surgery or injections.    Medications: Not currently aching medications for pain.    Current Outpatient Prescriptions on File Prior to Encounter   Medication Sig Dispense Refill     albuterol (PROVENTIL) 2.5 mg /3 mL (0.083 %) nebulizer solution Take 3 mL (2.5 mg total) by nebulization every 6 (six) hours as needed for wheezing. 75 mL 12     alcohol swabs (ALCOHOL PREP PADS) PadM USE FOUR TIMES A DAY AS DIRECTED/ SIV 4 ZAUG IB HNUB LI TUS ELIZABETH MOB QHIA 200 each 10     aspirin 325 MG EC tablet Take 1 tablet (325 mg total) by mouth daily. 100 tablet 2     atorvastatin (LIPITOR) 40 MG tablet Take 1 tablet (40 mg total) by mouth bedtime. 90 tablet 1     bisacodyl (DULCOLAX, BISACODYL,) 5 mg EC tablet Take 2 tablets (10 mg total) by mouth daily as needed for constipation. 30 tablet 1     blood glucose test strips Test blood sugars four times daily -- before meals and bedtime 200 each 11     blood pressure monitor Kit Check blood pressure daily 1 each 0     carboxymethylcellulose (REFRESH LIQUIGEL) 1 % ophthalmic solution 1 to 2 drops affected eye as needed for dryness (Patient taking differently: Apply 1-2 drops to eye as needed. 1 to 2 drops affected eye as needed for dryness) 30 mL 12     carvedilol (COREG) 12.5 MG tablet Take 1 tablet (12.5 mg total) by mouth 2 (two) times a day with meals. Take along with 6.25 mg BID to total 18.75 mg  tablet 1     carvedilol (COREG) 6.25 MG tablet Take 1 tablet (6.25 mg total) by mouth 2 (two) times a day with meals. Take along with 12.5 mg BID to total dose of 18.75 mg twice a day 60 tablet 3     ferrous sulfate 325 (65 FE) MG tablet Take 1 tablet by mouth daily with breakfast.       food supplemt, lactose-reduced (ENSURE ACTIVE HEART HEALTH) Liqd 1 can 3 times daily  (Vanilla) 90 Bottle 12     furosemide (LASIX) 20 MG tablet Take 20 mg by mouth daily.       ipratropium-albuterol (COMBIVENT RESPIMAT)  mcg/actuation Mist inhaler  "Inhale 1 puff 4 (four) times a day. 1 Inhaler 11     LANTUS SOLOSTAR 100 unit/mL (3 mL) pen Inject 8 Units under the skin bedtime. E11.39 15 mL 4     losartan (COZAAR) 100 MG tablet Take 50 mg by mouth daily. Indications: Chronic Heart Failure       nitroglycerin (NITROSTAT) 0.4 MG SL tablet Place 1 tablet (0.4 mg total) under the tongue every 5 (five) minutes as needed for chest pain. 90 tablet 3     NOVOLOG FLEXPEN 100 unit/mL injection pen Inject 3 Units under the skin 2 (two) times daily before lunch and supper. 15 mL 4     omeprazole (PRILOSEC) 20 MG capsule Take 1 capsule (20 mg total) by mouth 2 (two) times a day before meals. 60 capsule 3     pen needle, diabetic (NOVOFINE 32) 32 gauge x 1/4\" Ndle 32 G x 6 MM - Used to inject Novolog and Lantus insulin. Need 4 needles per day. E11.39 150 each 4     spironolactone (ALDACTONE) 25 MG tablet Take 1 tablet (25 mg total) by mouth daily. 90 tablet 3     acetaminophen 500 mg coapsule Take 2 capsules by mouth twice daily as needed for back pain (Patient taking differently: Take 1,000 mg by mouth 2 (two) times a day as needed for pain (back pain). ) 120 capsule 0     No current facility-administered medications on file prior to encounter.        No Known Allergies    Past Medical History:   Diagnosis Date     Alternating esotropia      Anemia      Atherosclerosis      CHF (congestive heart failure)      COPD (chronic obstructive pulmonary disease)      Coronary artery disease     status post MI times two with BMS to LAD     Diabetes mellitus      Dysphagia      Dyspnea      GERD (gastroesophageal reflux disease)      Hyperlipidemia      Hypertension      Ischemic cardiomyopathy      Ischemic cardiomyopathy     EF 20-25% - follows with Dr. Freed     Murmupat      Nonproliferative diabetic retinopathy      Occult blood positive stool 05/29/2014     Stenosis of larynx      Stroke      Vitamin D deficiency         Patient Active Problem List   Diagnosis     Atherosclerosis     " Alternating Esotropia     Chronic Obstructive Pulmonary Disease     Murmurs     Vitamin D Deficiency     Mixed hyperlipidemia     Nonproliferative Retinopathy     Acute Myocardial Infarction     Coronary Artery Disease     Ischemic Cardiomyopathy     Ischemic Stroke  left side weakness arms/legs     Stenosis Of Larynx     Esophageal Reflux     Anemia     Hyperlipidemia     CKD (chronic kidney disease) stage 3, GFR 30-59 ml/min     ACS (acute coronary syndrome)     Iron deficiency anemia     Myofascial pain syndrome     Lumbago     Joint Pain, Localized in the Shoulder      Cervicalgia     Hypertension     Pain of upper abdomen     Type 2 diabetes mellitus with ophthalmic manifestations     Bruit of right carotid artery     Elevated troponin     Calculus of gallbladder without cholecystitis     Coronary artery disease involving native coronary artery of native heart without angina pectoris       Past Surgical History:   Procedure Laterality Date     CARDIAC CATHETERIZATION       ESOPHAGOGASTRODUODENOSCOPY N/A 2/7/2017    Procedure: ESOPHAGOGASTRODUODENOSCOPY ;  Surgeon: Mitchell Mcbride MD;  [place] [facility] [place]  Service:      TRACHEOSTOMY         No family history on file.    SOCIAL HX: Patient is .  Patient denies smoking, alcohol, illicit drug use.    ROS: Positive for changes in vision, irregular heart rate, dizziness, anxiety, poor sleep quality, indigestion, back pain, joint pain, leg pain, headache, depression/worry, blood sugar changes due to diabetes.  Specifically negative for bowel/bladder dysfunction, balance changes, headache, dizziness, foot drop, fevers, chills, appetite changes, nausea/vomiting, unexplained weight loss. Otherwise 13 systems reviewed are negative. Please see the patient's intake questionnaire from today for details.    OBJECTIVE:  /55 (Patient Site: Left Arm, Patient Position: Sitting)  Pulse 70  Temp 97.4  F (36.3  C) (Oral)   Wt 106 lb (48.1 kg)  SpO2 98%   BMI 23.35 kg/m2    PHYSICAL EXAMINATION:    --CONSTITUTIONAL:  Vital signs as above.  No acute distress.  The patient is well nourished and well groomed.  --PSYCHIATRIC:  Appropriate mood and affect. The patient is awake, alert, oriented to person, place, time and answering questions appropriately with clear speech.    --SKIN:  Skin over the face, bilateral lower extremities, and posterior torso is clean, dry, intact without rashes.    --RESPIRATORY: Normal rhythm and effort. No abnormal accessory muscle breathing patterns noted.   --ABDOMINAL:  Soft, non-distended, non-tender throughout all quadrants.  No pulsatile mass palpated in the left lower quadrant.  --STANDING EXAMINATION:  Normal lumbar lordosis noted, no lateral shift.  --MUSCULOSKELETAL: Lumbar spine inspection reveals no evidence of deformity. Range of motion is not limited in lumbar flexion, extension. No tenderness to palpation. Straight leg raising in the supine position is negative to radicular pain. Sciatic notch non-tender.  --GROSS MOTOR: Gait is non-antalgic. Easily arises from a seated position. Toe walking and heel walking are normal without significant difficulty.    --LOWER EXTREMITY MOTOR TESTING:  Plantar flexion left 5/5, right 5/5   Dorsiflexion left 5/5, right 5/5   Great toe MTP extension left 5/5, right 5/5  Knee flexion left 5/5, right 5/5  Knee extension left 5/5, right 5/5   Hip flexion left 5/5, right 5/5  Hip abduction left 5/5, right 5/5  Hip adduction left 5/5, right 5/5   --HIPS: Full range of motion bilaterally. Negative FABERs on the involved lower extremity.   --NEUROLOGICAL:  2/4 patellar, medial hamstring, and achilles reflexes bilaterally.  Sensation to light touch is intact in the bilateral L4, L5, and S1 dermatomes. Babinski is negative. No clonus.  --VASCULAR:  2/4 dorsalis pedis and posterior tibialsi pulses bilaterally.  Bilateral lower extremities are warm.  There is no pitting edema of the bilateral lower  extremities.    RESULTS: Prior medical records from 4/14/17 were reviewed today.    Imaging: No results lumbar spine found.    Cervical Spine MRI 2/22/2010  Conclusion:  1.  MRI of the head: No acute intracranial pathology is identified.  No mass lesion.  No evidence for hemorrhage or acute infarct on this exam.  Mild small vessel degenerative changes scattered in the brain.  Moderate inflammatory disease in the left mastoid air cells.  2.  MRI cervical spine: At C3-4, there is a moderate sized broad-based central disc herniation which causes a broad but mild indentation along the ventral surface of the cord at this level.  At C6-7, there is a left posterior lateral interbody spurring with potential left C7 nerve root impingement.  No spinal cord edema or myelomalacia is identified.

## 2021-06-10 NOTE — PROGRESS NOTES
Xray cervical, thoracic, and lumbar was fax to [facility] per pt request, [place] location.

## 2021-06-10 NOTE — PROGRESS NOTES
Assessment/Plan:     1. Ischemic cardiomyopathy with systolic dysfunction, NYHA class II: Nav Laurent appears well compensated.  No signs of fluid retention today.  She continues to follow a low-sodium diet.  She states she has been losing weight.  Her primary doctor has put her on Ensure for supplement.  No changes to medications due to dizziness.  She met with a heart failure nurse clinician to discuss heart failure management.        Heart failure treatment includes:  - Beta blocker therapy with carvedilol 18.75 mg twice a day  - ARB therapy with losartan 100 mg daily  - Aldosterone blocker therapy with spironolactone 25 mg daily.  Last BMP was 3/6/2017 and was stable.  - Diuretic therapy with furosemide 20 mg daily    Follow-up with Dr. Freed in June/July and in the heart failure clinic as needed    Subjective:     Nav Laurent is seen at [facility] heart failure clinic today for continued follow-up.  An  is present for the duration of the appointment.  He follows up for ischemic cardiomyopathy with systolic dysfunction.  Her most recent echocardiogram was done February 9, 2017 which showed an ejection fraction of 19% along with mild mitral regurgitation.  She is not interested in receiving any device prophylactically to decrease her chances of sudden cardiac death. Her past medical history is also significant for hypertension, coronary artery disease, dyslipidemia, COPD, diabetes and chronic kidney disease. She has a history of an MI in which she received a bare-metal stent to the LAD.    Today, she states her dyspnea on exertion has improved.  She continues to feel weak and fatigued.  She has occasional dizziness with standing.  She denies any falls.  She denies any other acute heart failure symptoms.  She denies lightheadedness, shortness of breath, dyspnea on exertion, orthopnea, PND, palpitations, chest pain, abdominal fullness/bloating and lower extremity edema.      She is not monitoring  home weights due to unable to read the numbers on the scale.  She is following a low sodium diet.  She participates in regular physical activity including walking.      Review of Systems:   General: WNL  Eyes: WNL  Ears/Nose/Throat: WNL  Lungs: Cough  Heart: WNL  Stomach: WNL  Bladder: WNL  Muscle/Joints: Joint Pain, Muscle Weakness, Muscle Pain  Skin: WNL  Nervous System: WNL  Mental Health: WNL     Blood: WNL     Patient Active Problem List   Diagnosis     Atherosclerosis     Alternating Esotropia     Chronic Obstructive Pulmonary Disease     Murmurs     Vitamin D Deficiency     Mixed hyperlipidemia     Nonproliferative Retinopathy     Acute Myocardial Infarction     Coronary Artery Disease     Ischemic Cardiomyopathy     Ischemic Stroke  left side weakness arms/legs     Stenosis Of Larynx     Esophageal Reflux     Anemia     Hyperlipidemia     CKD (chronic kidney disease) stage 3, GFR 30-59 ml/min     ACS (acute coronary syndrome)     Iron deficiency anemia     Myofascial pain syndrome     Lumbago     Joint Pain, Localized in the Shoulder      Cervicalgia     Hypertension     Pain of upper abdomen     Type 2 diabetes mellitus with ophthalmic manifestations     Bruit of right carotid artery     Elevated troponin     Calculus of gallbladder without cholecystitis     Coronary artery disease involving native coronary artery of native heart without angina pectoris       Past Medical History:   Diagnosis Date     Alternating esotropia      Anemia      Atherosclerosis      CHF (congestive heart failure)      COPD (chronic obstructive pulmonary disease)      Coronary artery disease     status post MI times two with BMS to LAD     Diabetes mellitus      Dysphagia      Dyspnea      GERD (gastroesophageal reflux disease)      Hyperlipidemia      Hypertension      Ischemic cardiomyopathy      Ischemic cardiomyopathy     EF 20-25% - follows with Dr. Freed     Murmur      Nonproliferative diabetic retinopathy      Occult blood  positive stool 05/29/2014     Stenosis of larynx      Stroke      Vitamin D deficiency        Past Surgical History:   Procedure Laterality Date     CARDIAC CATHETERIZATION       ESOPHAGOGASTRODUODENOSCOPY N/A 2/7/2017    Procedure: ESOPHAGOGASTRODUODENOSCOPY ;  Surgeon: Mitchell Mcbride MD;  Location: [facility];  Service:      TRACHEOSTOMY         History reviewed. No pertinent family history.    Social History     Social History     Marital status:      Spouse name: N/A     Number of children: N/A     Years of education: N/A     Occupational History     Not on file.     Social History Main Topics     Smoking status: Never Smoker     Smokeless tobacco: Never Used     Alcohol use No     Drug use: No     Sexual activity: Not Currently     Other Topics Concern     Not on file     Social History Narrative    Lives with family       Current Outpatient Prescriptions   Medication Sig Dispense Refill     acetaminophen 500 mg coapsule Take 2 capsules by mouth twice daily as needed for back pain (Patient taking differently: Take 1,000 mg by mouth 2 (two) times a day as needed for pain (back pain). ) 120 capsule 0     albuterol (PROVENTIL) 2.5 mg /3 mL (0.083 %) nebulizer solution Take 3 mL (2.5 mg total) by nebulization every 6 (six) hours as needed for wheezing. 75 mL 12     alcohol swabs (ALCOHOL PREP PADS) PadM USE FOUR TIMES A DAY AS DIRECTED/ SIV 4 ZAUG IB HNUB LI TUS ELIZABETH MOB QHIA 200 each 10     aspirin 325 MG EC tablet Take 1 tablet (325 mg total) by mouth daily. 100 tablet 2     atorvastatin (LIPITOR) 40 MG tablet Take 1 tablet (40 mg total) by mouth bedtime. 90 tablet 1     bisacodyl (DULCOLAX, BISACODYL,) 5 mg EC tablet Take 2 tablets (10 mg total) by mouth daily as needed for constipation. 30 tablet 1     blood glucose test strips Test blood sugars four times daily -- before meals and bedtime 200 each 11     blood pressure monitor Kit Check blood pressure daily 1 each 0     carboxymethylcellulose  "(REFRESH LIQUIGEL) 1 % ophthalmic solution 1 to 2 drops affected eye as needed for dryness (Patient taking differently: Apply 1-2 drops to eye as needed. 1 to 2 drops affected eye as needed for dryness) 30 mL 12     carvedilol (COREG) 12.5 MG tablet Take 1 tablet (12.5 mg total) by mouth 2 (two) times a day with meals. 180 tablet 1     carvedilol (COREG) 6.25 MG tablet Take 1 tablet (6.25 mg total) by mouth 2 (two) times a day with meals. Take a total dose of 18.75 mg twice a day 60 tablet 3     ferrous sulfate 325 (65 FE) MG tablet Take 1 tablet by mouth daily with breakfast.       food supplemt, lactose-reduced (ENSURE ACTIVE HEART HEALTH) Liqd 1 can 3 times daily  (Vanilla) 90 Bottle 12     furosemide (LASIX) 20 MG tablet Take 20 mg by mouth daily.       ipratropium-albuterol (COMBIVENT RESPIMAT)  mcg/actuation Mist inhaler Inhale 1 puff 4 (four) times a day. 1 Inhaler 11     LANTUS SOLOSTAR 100 unit/mL (3 mL) pen Inject 8 Units under the skin bedtime. E11.39 15 mL 4     losartan (COZAAR) 100 MG tablet Take 50 mg by mouth daily. Indications: Chronic Heart Failure       nitroglycerin (NITROSTAT) 0.4 MG SL tablet Place 1 tablet (0.4 mg total) under the tongue every 5 (five) minutes as needed for chest pain. 90 tablet 3     NOVOLOG FLEXPEN 100 unit/mL injection pen Inject 3 Units under the skin 2 (two) times daily before lunch and supper. 15 mL 4     omeprazole (PRILOSEC) 20 MG capsule Take 1 capsule (20 mg total) by mouth 2 (two) times a day before meals. 60 capsule 3     pen needle, diabetic (NOVOFINE 32) 32 gauge x 1/4\" Ndle 32 G x 6 MM - Used to inject Novolog and Lantus insulin. Need 4 needles per day. E11.39 150 each 4     spironolactone (ALDACTONE) 25 MG tablet Take 1 tablet (25 mg total) by mouth daily. 90 tablet 3     No current facility-administered medications for this visit.        No Known Allergies    Objective:     Vitals:    04/17/17 1059   BP: 118/60   Pulse: 68   Resp: 16     Wt Readings from " Last 3 Encounters:   04/17/17 101 lb (45.8 kg)   04/14/17 101 lb 4 oz (45.9 kg)   03/31/17 101 lb 9.6 oz (46.1 kg)       General Appearance:   Alert, cooperative and in no acute distress.   HEENT:  No scleral icterus; the mucous membranes were pink and moist.   Neck: JVP normal. No HJR.   Chest: The spine was straight. The chest was symmetric.   Lungs:   Respirations unlabored; the lungs are clear to auscultation.   Cardiovascular:   Regular rhythm. S1 and S2 without murmur, clicks or rubs. Radial and posterior tibial pulses are intact and symmetrical.    Abdomen:  Soft, nontender, nondistended, bowel sounds present   Extremities: No cyanosis, clubbing, or edema.   Skin: No xanthelasma.   Neurologic: Mood and affect are appropriate.         Lab Review   Lab Results   Component Value Date    CREATININE 0.87 03/06/2017    BUN 19 03/06/2017     03/06/2017    K 4.4 03/06/2017     03/06/2017    CO2 24 03/06/2017     Lab Results   Component Value Date    BNP 3092 (H) 02/09/2017     BNP (pg/mL)   Date Value   02/09/2017 3092 (H)   02/08/2017 2168 (H)   09/28/2015 136 (H)     Creatinine (mg/dL)   Date Value   03/06/2017 0.87   02/17/2017 1.52 (H)   02/11/2017 1.25 (H)   02/10/2017 1.27 (H)       Cardiographics  Echocardiogram: 2/9/2017  Summary     Left ventricle ejection fraction is severely decreased. The calculated left ventricular ejection fraction is 19%.    Akinesis involving the entire anterior, anteroseptal, septal and apical segments    Mild mitral regurgitation    Pleural effusion is present          15 minutes were spent face to face with the patient with greater than 50% spent on education and counseling.      Amira [place], [facility]   Heart Failure Clinic

## 2021-06-10 NOTE — PROGRESS NOTES
0905 Nav arrived A&Ox4 ambulatory w her cane, and stable, accompanied by her daughter. HE Hmong  present. Pt confirms she is here for IVF. She states she has been feeling faint, dizzy and seeing stars with activity x2days and does not know why. Pt states she does not eat/drink much due to no appetite.  Pt had stroke July 2009 and oct 2010, blurry vision and L arm tingling since.    PIV R AC, 1failed attempt, excellent blood return and line easily flushed NS  1L LR infused at 333mL/hr until complete. VS improved during infusion. Line flushed NS and dc'd, site covered w clean dressing/coban. Dc education reviewed, pt/dtr stated understanding and that her needs were met today.  Pt did eat a moderate lunch and rested during her infusion today.  1250 Nav dc'd A&OX4 ambulatory with her cane and stable, accompanied by her daughter

## 2021-06-10 NOTE — PROGRESS NOTES
New patient evaluation  --PCP referred Dr. Salazar  --Hx of chronic low back pain x 5-6 years per pt  --C/O B/L low back pain which is her main concern  --Rates back pain 4/10  --Also C/O B/L posterior thigh pain to the knee, left side worse than right  --Also C/O ongoing neck, B/L upper back and B/L shoulder pain, comes and goes  --Hx of stroke left side, 2009 per pt  --No PT  --Tired chiro and acupuncture, no relief    Medication  --Tylenol 500 mg 2 cap BID, stopped taking due to no relief per pt

## 2021-06-12 NOTE — PROGRESS NOTES
"CHIEF COMPLAINT: Nav Laurent had concerns including Follow-up and Medication Refill.    [place]: 1.............. had concerns including Follow-up and Medication Refill.    1. Type 2 diabetes mellitus with retinopathy, with long-term current use of insulin, macular edema presence unspecified, unspecified laterality, unspecified retinopathy severity    2. Ischemic Cardiomyopathy    3. Mixed hyperlipidemia    4. Chronic Obstructive Pulmonary Disease    5. CKD (chronic kidney disease) stage 3, GFR 30-59 ml/min    6. Coronary artery disease involving native coronary artery of native heart without angina pectoris    7. Anemia    8. Hypertensive heart disease with heart failure       No problem-specific Assessment & Plan notes found for this encounter.      CC:             Pt states f/u DM check     Diabetes currently I believe she is taking Lantus 8 daily and she has NovoLog she states she is taking it 3 and 3 with meals she \"states her sugars are normal  She has had no low blood sugars  A.m. sugars have ranged [id number] day average of 159  P.m. sugars have been ranging 132 380 last 10 days  Coronary artery disease  No dyspnea no chest pain no chest discomfort  She does occasionally use albuterol she wants a refill of her medication for this  History of anemia no heartburn no dysphasia no dark stools except when she takes her iron      Medications today she brings in 2 bottles of atorvastatin both are full  She brings in 3 bottles of aspirin all of which are full  She brings in a bottle of carvedilol 12.5 and a bottle of carvedilol 6.25 these are completely full  Losartan has 7 weeks and it  Spironolactone is two thirds two thirds full  She does not bring in furosemide  She has 2 bottles of iron  She cannot tell me how she is taking her medication  She states that she is not able to see out of her eyes specifically her right eye is worsening and she is interested in seeing ophthalmology  She has 2 bottles of " gabapentin    What's it like:          Chronic muscle ache in back   How long is it ongoing:    Ongoing for long time   What makes it worse :       Constant pain all the time   What makes it better:         Pain med helps with pain   0/10-10/10:Pain/Intesity    Pain level can get up to a 10        Associated Sx:   Pt states loss of vision in right eye, can't see.         SUBJECTIVE:  Nav Laurent is a 76 y.o. female    Past Medical History:   Diagnosis Date     Alternating esotropia      Anemia      Anemia      Anxiety      Atherosclerosis      CHF (congestive heart failure)      COPD (chronic obstructive pulmonary disease)      Coronary artery disease     status post MI times two with BMS to LAD     Depression      Diabetes mellitus      Dysphagia      Dyspnea      Gall bladder disease      GERD (gastroesophageal reflux disease)      Hyperlipidemia      Hypertension      Ischemic cardiomyopathy      Ischemic cardiomyopathy     EF 20-25% - follows with Dr. Freed     Murmur      Nonproliferative diabetic retinopathy      Occult blood positive stool 05/29/2014     Stenosis of larynx      Stroke      Vitamin D deficiency      Past Surgical History:   Procedure Laterality Date     CARDIAC CATHETERIZATION       ESOPHAGOGASTRODUODENOSCOPY N/A 2/7/2017    Procedure: ESOPHAGOGASTRODUODENOSCOPY ;  Surgeon: Mitchell Mcbride MD;  Location: [facility];  Service:      TRACHEOSTOMY       Review of patient's allergies indicates no known allergies.  Current Outpatient Prescriptions   Medication Sig Dispense Refill     alcohol swabs (ALCOHOL PREP PADS) PadM USE FOUR TIMES A DAY AS DIRECTED/ SIV 4 ZAUG IB HNUB LI TUS ELIZABETH MOB QHIA 200 each 10     aspirin 325 MG EC tablet Take 1 tablet (325 mg total) by mouth daily. 100 tablet 2     atorvastatin (LIPITOR) 40 MG tablet TAKE 1 PILL (40 MG TOTAL) BY MOUTH EVERY DAY AT BEDTIME/ TXHUA HMO THAUM MUS PW NOJ 1 LUB PAB ZOO NTSHAV MUAJ ROJ 90 tablet 1     blood glucose test strips Test  "blood sugars four times daily -- before meals and bedtime 200 each 11     blood pressure monitor Kit Check blood pressure daily 1 each 0     carvedilol (COREG) 12.5 MG tablet Take 1 tablet (12.5 mg total) by mouth 2 (two) times a day with meals. Take along with 6.25 mg BID to total 18.75 mg  tablet 1     carvedilol (COREG) 6.25 MG tablet Take 1 tablet (6.25 mg total) by mouth 2 (two) times a day with meals. Take along with 12.5 mg BID to total dose of 18.75 mg twice a day 60 tablet 0     ferrous sulfate 325 (65 FE) MG tablet Take 1 tablet by mouth daily with breakfast.       food supplemt, lactose-reduced (ENSURE ACTIVE HEART HEALTH) Liqd 1 can 3 times daily  (Vanilla) 90 Bottle 12     gabapentin (NEURONTIN) 100 MG capsule TAKE 1 PILL BY MOUTH 3 TIMES EVERYDAY/ TXHUA HNUB NOJ 1 LUB TSHUAJ 3 ZAUG IB HNUB PAB BON MOB LEEG 90 capsule 3     ipratropium-albuterol (COMBIVENT RESPIMAT)  mcg/actuation Mist inhaler Inhale 1 puff 4 (four) times a day. 1 Inhaler 11     losartan (COZAAR) 100 MG tablet Take 0.5 tablets (50 mg total) by mouth daily. 30 tablet 5     NOVOLOG FLEXPEN 100 unit/mL injection pen Inject 3 Units under the skin 2 (two) times daily before lunch and supper. 15 mL 4     pen needle, diabetic (NOVOFINE 32) 32 gauge x 1/4\" Ndle 32 G x 6 MM - Used to inject Novolog and Lantus insulin. Need 4 needles per day. E11.39 150 each 4     spironolactone (ALDACTONE) 25 MG tablet Take 1 tablet (25 mg total) by mouth daily. 90 tablet 3     acetaminophen 500 mg coapsule Take 2 capsules by mouth twice daily as needed for back pain (Patient taking differently: Take 1,000 mg by mouth 2 (two) times a day as needed for pain (back pain). ) 120 capsule 0     albuterol (PROVENTIL) 2.5 mg /3 mL (0.083 %) nebulizer solution Take 3 mL (2.5 mg total) by nebulization every 6 (six) hours as needed for wheezing. 75 mL 12     bisacodyl (DULCOLAX, BISACODYL,) 5 mg EC tablet Take 2 tablets (10 mg total) by mouth daily as needed " for constipation. 30 tablet 1     carboxymethylcellulose (REFRESH LIQUIGEL) 1 % ophthalmic solution 1 to 2 drops affected eye as needed for dryness (Patient taking differently: Apply 1-2 drops to eye as needed. 1 to 2 drops affected eye as needed for dryness) 30 mL 12     furosemide (LASIX) 20 MG tablet Take 1 tablet (20 mg total) by mouth daily. 90 tablet 1     furosemide (LASIX) 20 MG tablet Take 1 tablet (20 mg total) by mouth daily. As needed for swelling 90 tablet 0     ketotifen (ZADITOR/ZYRTEC ITCHY EYES) 0.025 % (0.035 %) ophthalmic solution 1 drop affected eye for itching and watering twice daily as needed 19 mL prn     LANTUS SOLOSTAR 100 unit/mL (3 mL) pen Inject 8 Units under the skin bedtime. E11.39 15 mL 4     nitroglycerin (NITROSTAT) 0.4 MG SL tablet Place 1 tablet (0.4 mg total) under the tongue every 5 (five) minutes as needed for chest pain. 90 tablet 3     omeprazole (PRILOSEC) 20 MG capsule Take 1 capsule (20 mg total) by mouth daily before breakfast. 90 capsule 0     No current facility-administered medications for this visit.      No family history on file.  Social History     Social History     Marital status:      Spouse name: N/A     Number of children: N/A     Years of education: N/A     Social History Main Topics     Smoking status: Never Smoker     Smokeless tobacco: Never Used     Alcohol use No     Drug use: No     Sexual activity: Not Currently     Other Topics Concern     None     Social History Narrative    Lives with family     Patient Active Problem List   Diagnosis     Atherosclerosis     Alternating Esotropia     Chronic Obstructive Pulmonary Disease     Murmurs     Vitamin D Deficiency     Mixed hyperlipidemia     Nonproliferative Retinopathy     Acute Myocardial Infarction     Coronary Artery Disease     Ischemic Cardiomyopathy     Ischemic Stroke  left side weakness arms/legs     Stenosis Of Larynx     Esophageal Reflux     Anemia     Hyperlipidemia     CKD (chronic  kidney disease) stage 3, GFR 30-59 ml/min     ACS (acute coronary syndrome)     Iron deficiency anemia     Myofascial pain syndrome     Lumbago     Joint Pain, Localized in the Shoulder      Cervicalgia     Hypertension     Pain of upper abdomen     Type 2 diabetes mellitus with ophthalmic manifestations     Bruit of right carotid artery     Elevated troponin     Calculus of gallbladder without cholecystitis     Coronary artery disease involving native coronary artery of native heart without angina pectoris                                              SOCIAL: She  reports that she has never smoked. She has never used smokeless tobacco. She reports that she does not drink alcohol or use illicit drugs.    REVIEW OF SYSTEMS:     FAMILY HISTORY NOT PERTINENT TO CHIEF COMPLAINT OR PRESENTING PROBLEM  Review of systems otherwise negative as requested from patient, except   Positive ROS outlined and discussed in [place].    OBJECTIVE:  /62 (Patient Site: Left Arm, Patient Position: Sitting, Cuff Size: Adult Regular)  Pulse 72  Resp 16  Wt 111 lb 4 oz (50.5 kg)  SpO2 99%  Breastfeeding? No  BMI 24.5 kg/m2    GENERAL:     No acute distress.   Alert and oriented X 3         Physical:  Bilateral cataracts  East ectropion  Oropharynx no thrush  Lungs clear with no crackles no wheezing  Cardiac S1-S2 she is in a regular sinus rhythm no appreciable murmur  Lower extremes edema  Normal monofilament testing  Palpable distal pulses  Ulcerations  Pale complexion next    ASSESSMENT & PLAN      Nav was seen today for follow-up and medication refill.    Diagnoses and all orders for this visit:    Type 2 diabetes mellitus with retinopathy, with long-term current use of insulin, macular edema presence unspecified, unspecified laterality, unspecified retinopathy severity  -     Glycosylated Hemoglobin A1c  -     Comprehensive Metabolic Panel  -     Alcohol prep pad    Ischemic Cardiomyopathy  -     carvedilol (COREG) 6.25 MG  tablet; Take 1 tablet (6.25 mg total) by mouth 2 (two) times a day with meals. Take along with 12.5 mg BID to total dose of 18.75 mg twice a day  -     Comprehensive Metabolic Panel  -     Thyroid Cascade  -     BNP(B-type Natriuretic Peptide)    Mixed hyperlipidemia  -     Thyroid Cascade    Chronic Obstructive Pulmonary Disease    CKD (chronic kidney disease) stage 3, GFR 30-59 ml/min    Coronary artery disease involving native coronary artery of native heart without angina pectoris    Anemia  -     HM1(CBC and Differential)  -     Iron and Transferrin Iron Binding Capacity  -     Ferritin  -     HM1 (CBC with Diff)    Hypertensive heart disease with heart failure   -     BNP(B-type Natriuretic Peptide)    Other orders  -     omeprazole (PRILOSEC) 20 MG capsule; Take 1 capsule (20 mg total) by mouth daily before breakfast.  -     ketotifen (ZADITOR/ZYRTEC ITCHY EYES) 0.025 % (0.035 %) ophthalmic solution; 1 drop affected eye for itching and watering twice daily as needed  -     furosemide (LASIX) 20 MG tablet; Take 1 tablet (20 mg total) by mouth daily. As needed for swelling    Aloe up with her son who sets up her medications so we can establish what medication she is taking daily  Every 3 months visit for diabetes  Eyes see ophthalmology  Continue ketoprofen as needed for allergy  No Follow-up on file.       Anticipatory Guidance and Symptomatic Cares Discussed   Advised to call back directly if there are further questions, or if these symptoms fail to improve as anticipated or worsen.  Return to clinic if patient has a clinical concern that warrants an exam.        30  Min Total Time, > 50% counseling and coordination of Care    Tomas Salazar MD  Family Medicine   [facility] [address] [phone]

## 2021-06-13 NOTE — PROGRESS NOTES
MTM Follow Up Encounter  ASSESSMENT AND PLAN  Medication Adherence: Brings her medications today. No home care anymore -- feels like she does not need them. Has a box at home that son puts medications. Son reads English. Takes AM medications after she eats. Takes evening medications at 7pm and has evening meal around the same time.     Type 2 Diabetes:  Uncontrolled. A1C not at goal of <7%, but slightly improved. FBP unknown if at goal  mg/dL -- she is testing blood sugars at random parts of the day. Recommended to check fasting AM and 2 hours after a meal. Overall, even if her 9 and 10am values are fasting, they are elevated. Will have her increase Lantus slightly yo 10 units. Ok to remain off Trulicity. Can consider switching to Glucerna at follow up, since it contains less sugar than Ensure.   PLAN:   1. Increase Lantus to 10 units.   Future consideration = Consider changing Ensure to Glucerna    GERD: Uncontrolled. Unclear if her symptoms are GERD related, but will try increasing omeprazole today. Reviewed that omeprazole will work best if given 30-60 minutes before a meal. Will increase to BID, take 30-60 mins before meals.   PLAN:   1. Increase omeprazole to BID (30-60 minutes before a meal)    CHF: Stable, no edema currently. Will recheck K and SCr today since it was increased when last checked. Reviewed to avoid NSAIDs. Could consider decreasing spironolactone to 12.5 mg if K remains elevated. K likely higher since she is not taking furosemide anymore.   PLAN:   1. BMP today    CAD: Stable -- ok to crush. Recommended to continue current regimen.  Could consider changing to rosuvastatin 20 mg, which would be the equivalent dose of atorvastatin 40 mg and may be a smaller tablet size. Will discuss further at follow up.   Future consideration = change atorvastatin 40 mg to rosuvastatin 20 mg.     Pain: Stable. Pain does not seem to be a complaint of her and does not appear that she is taking gabapentin.      COPD: Stable. Recommended to continue current regimen.     Anemia: Last Hgb level slightly low -- reviewed that I would recommend continuing iron tablets and we will continue to monitor. Likely iron is contributing to the dark stools.     MTM FOLLOW UP  October 9th + Martin    SUBJECTIVE AND OBJECTIVE  Navadan Laurent is a 76 y.o. female here for a follow-up medication therapy management (MTM) appointment. A [employer]  joins us today.     Medication Adherence: Brings her medications today. No home care anymore -- feels like she does not need them. Has a box at home that son puts medications. Son reads English. Takes AM medications after she eats. Takes evening medications at 7pm and has evening meal around the same time.     Type 2 Diabetes: Currently taking Lantus 8 units and Novolog 4 units before meals. She is no longer on Trulicity and would prefer to stay on insulin because the Trulicity needle hurt. Uses a magnifying glass to read the amount of units on the insulin pen.   Reports that she eats three meals daily.   Tests BG randomly throughout the day. Blood sugars per glucometer:   ~9am: 265, 214, 122, 103  ~10am: 258, 286, 153  ~5pm: 115,   ~8pm: 389, 172  ~11pm: 188, 257, 204, 301, 204, 194, 308, 178  12-2am: 321, 351, 187, 196  Last A1c checked 9/5/17 = 7.9%, previously 8.5% on 4/14/17.   Hypoglycemia none   Using a Contour Next meter.   Microalbumin checked 2/17/17  Reports that her appetite is about the same. She is drinking Ensure three times daily.     GERD: Currently taking omeprazole 20 mg QAM. No fatty foods. Feels like she has acid reflux, occurs at no particular time. 1-2 times daily, not necessarily after she eats. Comes when it wants to, morning or night. Tightness in throat and chest, burning, pain up to head and neck. Referred to heart doctor. She likes the omeprazole and would like to continue.     CHF: Currently taking losartan 50 mg daily, carvedilol 18.25 mg BID, and spironolactone  25 mg daily. Has not been taking furosemide -- denies swelling in feet or ankles. Is able to lay flat. Last K level on 9/5/17 = 5.4 and SCr 1.55 (previously 0.87)    CAD: Currently taking atorvastatin 40 mg daily. Has to crush and mix atorvastatin with applesauce since she cannot swallow. Has a mortar and pestle to crush. Last LDL on 9/30/16.   Reports that she uses nitro a few times a month as needed.     Pain: Does not have gabapentin with her today.  Denies any pain, reports that her pain is better. Just back pain. Uses APAP only, no ibuprofen or Aleve.     COPD: Repots that her breathing is ok. Has a nebulizer and Combivent at home -- reports that she only ues as needed in emergency -- twice weekly.     Anemia: Currently taking ferrous sulfate 325 mg daily. Reports that her stools get dark from the iron.   Last hgb = 11.7 on 9/5/17.           Nav's medication list was reviewed with them, discussing reason for use, directions for use, and potential side effects of each medication as needed. Indication, safety, efficacy, and convenience was assessed for all medications addressed above.  No environmental factors were noted currently affecting patient.  This care plan was communicated via EMR with her primary care provider, Tomas Salazar MD, who is the authorizing prescriber for this visit.  Direct supervision was available by either the patient's PCP or other available provider.    Time and complexity billing metrics are included in the docflowsheet linked to this visit    Time spent: 45 min  Jessica Kaplan, Pharm.D., BCACP   MTM Pharmacist at [facility]

## 2021-06-13 NOTE — PROGRESS NOTES
MTM Follow Up Encounter  ASSESSMENT AND PLAN  Type 2 Diabetes: Uncontrolled. A1C not at goal of <7%, but slightly improved. FBP over all not at goal  mg/dL -- she is testing blood sugars at random parts of the day. Recommended to check fasting AM. Will have her increase Lantus slightly to 12 units. Ok to remain off Trulicity. Can consider switching to Glucerna at follow up, since it contains less sugar than Ensure.   PLAN:   1. Increase Lantus to 12 units.   2. Consider changing Ensure to Glucerna  **Per [facility], she has been receiving Ensure -- will change to Glucerna (pt prefers vanilla)     GERD: Improved. Looking at her pill box, she is only taking omeprazole 20 mg once daily. Since she denies any symptoms, will have her continue at this dose.     CAD: Stable -- ok to crush. Recommended to continue current regimen.  Could consider changing to rosuvastatin 20 mg, which would be the equivalent dose of atorvastatin 40 mg and may be a smaller tablet size  PLAN:   1. I will look into the pill size of rosuvastatin 20 mg  **Per [facility], rosuvastatin is significantly smaller. -- rx sent.     MTM FOLLOW UP  3 weeks    SUBJECTIVE AND OBJECTIVE  Nav Laurent is a 76 y.o. female here for a follow-up medication therapy management (MTM) appointment. A [employer]  joins us today. They were unaware of our appointment today, therefore it was very brief.     Medication Adherence: Brings her medications and pill box -- her son sets it up.     Type 2 Diabetes: Currently taking Lantus 10 units and Novolog 4 units TID -- confirmed doses. Tests BG 2-3 times daily. Blood sugars per glucometer:   Fasting AM = 177, 100, 214, 226.  ~10am = 225, 262, 184, 375, 143, 199  Noon = 172, , 336, 210, 180  8-10pm = 339, 265, 174, 394, 261, 257  10-11pm = 405, 366, 153, 344,   12-2 am = 86,  241, 321, 326  Last A1c checked 9/5/17 = 7.9%.   Hypoglycemia none  Using a [employer] Contour Next EZ meter.   Microalbumin checked 2/17/17  She is  drinking Ensure three times daily.      GERD: At last MTM appt, omeprazole increased to BID. Reports no pain in her stomach anymore.      CAD: Currently taking atorvastatin 40 mg daily. Has to crush and mix atorvastatin with applesauce since she cannot swallow. Has a mortar and pestle to crush. Last LDL on 9/30/16.          Nav's medication list was reviewed with them, discussing reason for use, directions for use, and potential side effects of each medication as needed. Indication, safety, efficacy, and convenience was assessed for all medications addressed above.  No environmental factors were noted currently affecting patient.  This care plan was communicated via EMR with her primary care provider, Tomas Salazar MD, who is the authorizing prescriber for this visit.  Direct supervision was available by either the patient's PCP or other available provider.    Time and complexity billing metrics are included in the docflowsheet linked to this visit    Time spent: 10 min  Jessica Kaplan Pharm.D., BCACP   MTM Pharmacist at [facility]

## 2021-06-13 NOTE — PROGRESS NOTES
CHIEF COMPLAINT: Nav Laurent had concerns including Follow-up.    [place]: 1.............. had concerns including Follow-up.    1. Atherosclerosis of coronary artery of native heart with angina pectoris, unspecified vessel or lesion type    2. Bruit of right carotid artery    3. Type 2 diabetes mellitus with retinopathy, with long-term current use of insulin, macular edema presence unspecified, unspecified laterality, unspecified retinopathy severity    4. CKD (chronic kidney disease) stage 3, GFR 30-59 ml/min    5. Anemia, unspecified type    6. Nonproliferative Retinopathy    7. Coronary artery disease involving native coronary artery of native heart without angina pectoris    8. Other emphysema    9. Mixed hyperlipidemia    10. Essential hypertension    11. Iron deficiency anemia, unspecified iron deficiency anemia type      Other emphysema  COPD is unchanged.  Warning signs of respiratory distress were reviewed with the patient.         Mixed hyperlipidemia  Lipid abnormalities are unchanged.  Nutritional counseling was provided.  Lipids will be reassessed in 6 months.    Coronary artery disease involving native coronary artery of native heart without angina pectoris  Coronary artery disease is unchanged.  Continue current treatment regimen.  Regular aerobic exercise.  Continue current medications.  Cardiac status will be reassessed in 6 months.    Hypertension  Hypertension is improving with treatment.  Regular aerobic exercise.  Continue current medications.  Blood pressure will be reassessed at the next regular appointment.    Iron deficiency anemia  NO  DIZZINESS NO DYSPNEA      CC:              Pt states f/u DM and discuss lab results    What's it like:            Pt states had labs drawn, did referral, and now here to discuss lab results.           How long is it ongoing:     No pain today  What makes it worse :       None   What makes it better:         none  0/10-10/10:Pain/Intesity     0      Associated  Sx:  Pt states bloating today, anytime she eats pork or leftovers.         SUBJECTIVE:  Nav Laurent is a 76 y.o. female    Past Medical History:   Diagnosis Date     Alternating esotropia      Anemia      Anemia      Anxiety      Atherosclerosis      CHF (congestive heart failure)      COPD (chronic obstructive pulmonary disease)      Coronary artery disease     status post MI times two with BMS to LAD     Depression      Diabetes mellitus      Dysphagia      Dyspnea      Gall bladder disease      GERD (gastroesophageal reflux disease)      Hyperlipidemia      Hypertension      Ischemic cardiomyopathy      Ischemic cardiomyopathy     EF 20-25% - follows with Dr. Freed     Murmur      Nonproliferative diabetic retinopathy      Occult blood positive stool 05/29/2014     Stenosis of larynx      Stroke      Vitamin D deficiency      Past Surgical History:   Procedure Laterality Date     CARDIAC CATHETERIZATION       ESOPHAGOGASTRODUODENOSCOPY N/A 2/7/2017    Procedure: ESOPHAGOGASTRODUODENOSCOPY ;  Surgeon: Mitchell Mcbride MD;  Location: [facility];  Service:      TRACHEOSTOMY       Review of patient's allergies indicates no known allergies.  Current Outpatient Prescriptions   Medication Sig Dispense Refill     acetaminophen 500 mg coapsule Take 2 capsules by mouth twice daily as needed for back pain (Patient taking differently: Take 1,000 mg by mouth 2 (two) times a day as needed for pain (back pain). ) 120 capsule 0     albuterol (PROVENTIL) 2.5 mg /3 mL (0.083 %) nebulizer solution Take 3 mL (2.5 mg total) by nebulization every 6 (six) hours as needed for wheezing. 75 mL 12     alcohol swabs (ALCOHOL PREP PADS) PadM USE FOUR TIMES A DAY AS DIRECTED/ SIV 4 ZAUG IB HNUB LI TUS ELIZABETH MOB QHIA 200 each 10     aspirin 325 MG EC tablet Take 1 tablet (325 mg total) by mouth daily. 100 tablet 2     atorvastatin (LIPITOR) 40 MG tablet TAKE 1 PILL (40 MG TOTAL) BY MOUTH EVERY DAY AT BEDTIME/ TXHUA HMO THAUM MUS PW NOJ 1  JODY JACKSON NTSHAV MUAJ ROJ 90 tablet 1     bisacodyl (DULCOLAX, BISACODYL,) 5 mg EC tablet Take 2 tablets (10 mg total) by mouth daily as needed for constipation. 30 tablet 1     blood glucose test strips Test blood sugars four times daily -- before meals and bedtime 200 each 11     blood pressure monitor Kit Check blood pressure daily 1 each 0     carboxymethylcellulose (REFRESH LIQUIGEL) 1 % ophthalmic solution 1 to 2 drops affected eye as needed for dryness (Patient taking differently: Apply 1-2 drops to eye as needed. 1 to 2 drops affected eye as needed for dryness) 30 mL 12     carvedilol (COREG) 12.5 MG tablet Take 1 tablet (12.5 mg total) by mouth 2 (two) times a day with meals. Take along with 6.25 mg BID to total 18.75 mg  tablet 1     carvedilol (COREG) 6.25 MG tablet Take 1 tablet (6.25 mg total) by mouth 2 (two) times a day with meals. Take along with 12.5 mg BID to total dose of 18.75 mg twice a day 60 tablet 0     ferrous sulfate 325 (65 FE) MG tablet Take 1 tablet by mouth daily with breakfast.       food supplemt, lactose-reduced (ENSURE ACTIVE HEART HEALTH) Liqd 1 can 3 times daily  (Vanilla) 90 Bottle 12     furosemide (LASIX) 20 MG tablet Take 1 tablet (20 mg total) by mouth daily. As needed for swelling 90 tablet 0     ipratropium-albuterol (COMBIVENT RESPIMAT)  mcg/actuation Mist inhaler Inhale 1 puff 4 (four) times a day. 1 Inhaler 11     ketotifen (ZADITOR/ZYRTEC ITCHY EYES) 0.025 % (0.035 %) ophthalmic solution 1 drop affected eye for itching and watering twice daily as needed 19 mL prn     LANTUS SOLOSTAR 100 unit/mL (3 mL) pen Inject 10 Units under the skin at bedtime. E11.39 15 mL 4     losartan (COZAAR) 100 MG tablet Take 0.5 tablets (50 mg total) by mouth daily. 30 tablet 5     nitroglycerin (NITROSTAT) 0.4 MG SL tablet Place 1 tablet (0.4 mg total) under the tongue every 5 (five) minutes as needed for chest pain. 30 tablet 3     NOVOLOG FLEXPEN 100 unit/mL injection pen  "Inject 4 Units under the skin 3 (three) times a day before meals. 15 mL 4     omeprazole (PRILOSEC) 20 MG capsule Take 1 capsule (20 mg total) by mouth 2 (two) times a day before meals. 180 capsule 0     pen needle, diabetic (NOVOFINE 32) 32 gauge x 1/4\" Ndle 32 G x 6 MM - Used to inject Novolog and Lantus insulin. Need 4 needles per day. E11.39 150 each 4     spironolactone (ALDACTONE) 25 MG tablet Take 1 tablet (25 mg total) by mouth daily. 90 tablet 3     No current facility-administered medications for this visit.      No family history on file.  Social History     Social History     Marital status:      Spouse name: N/A     Number of children: N/A     Years of education: N/A     Social History Main Topics     Smoking status: Never Smoker     Smokeless tobacco: Never Used     Alcohol use No     Drug use: No     Sexual activity: Not Currently     Other Topics Concern     None     Social History Narrative    Lives with family     Patient Active Problem List   Diagnosis     Atherosclerosis     Alternating Esotropia     Other emphysema     Murmurs     Vitamin D Deficiency     Mixed hyperlipidemia     Nonproliferative Retinopathy     Acute Myocardial Infarction     Coronary Artery Disease     Ischemic Cardiomyopathy     Ischemic Stroke  left side weakness arms/legs     Stenosis Of Larynx     Esophageal Reflux     Anemia     Hyperlipidemia     CKD (chronic kidney disease) stage 3, GFR 30-59 ml/min     ACS (acute coronary syndrome)     Iron deficiency anemia     Myofascial pain syndrome     Lumbago     Joint Pain, Localized in the Shoulder      Cervicalgia     Hypertension     Pain of upper abdomen     Type 2 diabetes mellitus with ophthalmic manifestations     Bruit of right carotid artery     Calculus of gallbladder without cholecystitis     Coronary artery disease involving native coronary artery of native heart without angina pectoris                                              SOCIAL: She  reports that she " has never smoked. She has never used smokeless tobacco. She reports that she does not drink alcohol or use illicit drugs.    REVIEW OF SYSTEMS:     FAMILY HISTORY NOT PERTINENT TO CHIEF COMPLAINT OR PRESENTING PROBLEM  Review of systems otherwise negative as requested from patient, except   Positive ROS outlined and discussed in [place].    OBJECTIVE:  /68 (Patient Site: Left Arm, Patient Position: Sitting, Cuff Size: Adult Regular)  Pulse 77  Resp 16  Wt 110 lb 8 oz (50.1 kg)  SpO2 99%  Breastfeeding? No  BMI 24.34 kg/m2    GENERAL:     No acute distress.   Alert and oriented X 3         Physical:    Carotid bruit on the right  Oropharynx upper lower dentures no thrush  Easy tropia of the right eye currently  No cervical or supraclavicular nodes  Old scar midline from a tricky [place]  Lungs are clear without crackles  Cardiac S1-S2 positive S3  Abdomen soft with positive bowel sounds  Calves are supple  Excellent distal pulses  Normal foot exam  No tremulousness  Slightly pale        ASSESSMENT & PLAN      Nav was seen today for follow-up.    Diagnoses and all orders for this visit:    Atherosclerosis of coronary artery of native heart with angina pectoris, unspecified vessel or lesion type  -     CK Total  -     HM1(CBC and Differential)  -     Influenza High Dose, Seasonal 65+ yrs  -     HM1 (CBC with Diff)    Bruit of right carotid artery    Type 2 diabetes mellitus with retinopathy, with long-term current use of insulin, macular edema presence unspecified, unspecified laterality, unspecified retinopathy severity    CKD (chronic kidney disease) stage 3, GFR 30-59 ml/min    Anemia, unspecified type    Nonproliferative Retinopathy    Coronary artery disease involving native coronary artery of native heart without angina pectoris  -     nitroglycerin (NITROSTAT) 0.4 MG SL tablet; Place 1 tablet (0.4 mg total) under the tongue every 5 (five) minutes as needed for chest pain.    Other emphysema    Mixed  hyperlipidemia  -     Hepatic Profile    Essential hypertension    Iron deficiency anemia, unspecified iron deficiency anemia type        Return in about 3 months (around 1/9/2018).  Once he had of goal patient to see MTM  Blood pressure goal  Continue present medications  Currently on an ARB, spironolactone, statin      Anticipatory Guidance and Symptomatic Cares Discussed   Advised to call back directly if there are further questions, or if these symptoms fail to improve as anticipated or worsen.  Return to clinic if patient has a clinical concern that warrants an exam.        25  Min Total Time, > 50% counseling and coordination of Care    Tomas Salazar MD  Family Medicine   [facility] [id number] [phone]

## 2021-06-13 NOTE — PROGRESS NOTES
MTM Follow Up Encounter  ASSESSMENT AND PLAN  Type 2 Diabetes: Uncontrolled. A1C not at goal of <7%, but slightly improved. FBP over all not at goal  mg/dL -- she is testing blood sugars at random parts of the day. Recommended to check fasting AM. Will have her increase Lantus slightly to 12 units and take this dose consistently.   PLAN:   1. Increase Lantus to 12 units.     CAD: At last MTM appt, atorvastatin was changed to rosuvastatin, which was confirmed to be smaller than atorvastatin. Reviewed with Nav that this was sent already. Per [facility], was picked up 10/21/17 by her son. Recommended that she check at home and bring bottle to follow up.     Pain: Stable Unclear if Nav is having relief from gabapentin, but it appears that she believes that taking her medicines helps with her pain. She is on a very low dose. Will have her continue for now and will reassess at follow up.     CHF: Unclear if she is taking carvedilol at the appropriate dose that has been prescribed to her. I reinforced that she is missing a strnegth of carvedilol with hre and she plansot look at home. Appears to be stable with no current edema.   Per [facility], furosemide last filed 9/5/17 for 3 months and carvedilol 12.5 mg delivered 10/23/17    MTM FOLLOW UP  1 month    SUBJECTIVE AND OBJECTIVE  Nav FATUMA Laurent is a 76 y.o. female here for a follow-up medication therapy management (MTM) appointment. A [employer]  oins us today.     Medication Concern(s)/Question(s): No concerns    Medication Adherence: Brings her medications today. Brings: aspirin 325 mg, varvedilol 6.25 mg, Lantus, Novolog, losartan, omeprazole, spironolactone, and atorvastatin.     Type 2 Diabetes: Currently taking Lantus and Novolog 5 units TID. If BG are high (200-300) she gives Lantus 10 units, if low (<170) she gives 8 units. At last MTM appt, Lantus was increased to 12 units.   Tests BG 1-2 times daily, various times. Blood sugars per glucometer:   Fasting AM  ~9am = 229, 209.  ~11am-12pm = 158, 189, 208, 196, 146, 188, 170, 164  8-11pm = 341, 214, 327, 179, 150, 216, 348, 233, 279, 283, 230, 309, 258  Last A1c checked 9/5/17 = 7.9%.   Hypoglycemia none  Microalbumin checked 2/17/17  Reports that she got the Glucerna from [facility]. Reports that it is fine, she likes the taste. She mixes it water. 3 bottles a day.   A lot of [employer] parties lately so she has been eating more.   Reports that she has needles at home but she does not know what they are for. It is something once a week.     CAD: Brings atorvastatin 40 mg today. Has to crush and mix atorvastatin with applesauce since she cannot swallow. Has a mortar and pestle to crush. At last MTM appt, she was prescribed rosuvastatin 20 mg since I confirmed with [facility] that it is smaller. She still has not received it.  Last LDL on 9/30/16.      Pain: Brings gabapentin 100 mg today -- she has not brought it in the past. Reports that she remembers taking medicines like this shape. Does not know what it is for. Reports that keeping up with medicines helps her keep up with the pain. Mainly low back issues.     CHF: Brings losartan 100 mg daily, carvedilol 6.25 mg BID, and spironolactone 25 mg daily. Did not bring carvedilol 12.5 mg or furosemide today. Has not been taking furosemide -- denies swelling in feet or ankles. Thinks that carvedilol 12.5 mg is at home.         Nav's medication list was reviewed with them, discussing reason for use, directions for use, and potential side effects of each medication as needed. Indication, safety, efficacy, and convenience was assessed for all medications addressed above.  No environmental factors were noted currently affecting patient.  This care plan was communicated via EMR with her primary care provider, Tomas Salazar MD, who is the authorizing prescriber for this visit.  Direct supervision was available by either the patient's PCP or other available provider.    Time and complexity  billing metrics are included in the docflowsheet linked to this visit    Time spent: 30 min  Jessica Kaplan, Pharm.D., BCACP   MTM Pharmacist at [facility]

## 2021-06-14 NOTE — PROGRESS NOTES
Pt is here to discuss issues with bloating after eating greasy foods and meats.  Pt had an U/S in January which showed cholelithiasis.    Olivia Oliveira RN, CBN  [facility]  P [phone]  F [phone]

## 2021-06-14 NOTE — PROGRESS NOTES
MTM Follow Up Encounter  ASSESSMENT AND PLAN  Medication Adherence:   Per [facility], carvedilol 12.5 mg picked up on 11/20.   Cancelled trulicity on her profile at [facility].     Knee Pain: Continue to keep weight off and follow up with Dr. Salazar      Type 2 Diabetes:  poorly controlled. A1C was not at goal of <7%, and I would anticipate that it will not be better. Will check at follow up. FBP at goal  mg/dL, but she does not have many values since our last MTM appt. Will remain her on 10 units of Tresiba therefore. Evening blood sugars seems to be the highest, therefore recommended that she increase her mealtime Novolog to 7 units. She was nervous, but I emphasized that this is a minor change.   Will recheck Microalbumin at follow up  Encouraged her to minimize rice and noodles.   She will likely need a more controlled A1c if she would like to proceed with surgery.   PLAN:   1. Continue Tresiba 10 units.   2. Increase Novolog to 7 units before meals.   3. A1c and microalbuminuria at follow up.      Hyperlipidemia: Stable. Recommended to continue current regimen. Will check lipid panel at follow up.     MTM FOLLOW UP  2 weeks    SUBJECTIVE AND OBJECTIVE  Nav Laurent is a 76 y.o. female here for a follow-up medication therapy management (MTM) appointment.     Medication Adherence: Brings her medicines today. Brings all medicines on her list, except carvedilol 12.5 mg BID (has carvedilol 6.25 mg). Brings Trulicity 0.75 mg which she is not on anymore. Reports that [facility] keeps sending it to her.     Knee Pain: Reports that she fell on 11/11 after tripping over her grandchild. Seen by Dr Salazar on 11/30/17 then [facility] and was told to keep weight off for 6 weeks.      Type 2 Diabetes: Currently taking Tresiba 10 units daily and Novolog 5 units before meals (usually eats two meals a day). At last MTM appt, Lantus was changed to Tresiba and continued on 12 units. She reports that her body feels shaky at 12  and she is scared. Would like 8 or 10 units. Brings Trulicity 0.75 today -- she is not taking, but her pharmacy delivered it to her.   Tests BG 1-3 times daily before meals. Blood sugars per glucometer:   Fasting AM  = 88, 87  10-2pm = 199, 215, 175, 94, 100, 268, 202, 187  9-10pm = 229, 251, 232, 383, 343, 262, 202, 289, 367, 375  Last A1c checked 9/5/17 = 7.9%.   Microalbumin checked 2/17/17 ratio = 122.3  Daughter makes her a lot of noodles. Thinks she needs to cut back on the rice.      Hyperlipidemia: Brings rosuvastatin 20 mg today. At last MTM appt, atorvastatin 40 mg was changed since they were hard for her to swallow.Reports that she has been taking rosuvastatin and it is easier to swallow. Last lipids checked 9/30/16.    Gallstones -- saw surgery and cholestyectomy was recommended.           Nav's medication list was reviewed with them, discussing reason for use, directions for use, and potential side effects of each medication as needed. Indication, safety, efficacy, and convenience was assessed for all medications addressed above.  No environmental factors were noted currently affecting patient.  This care plan was communicated via EMR with her primary care provider, Tomas Salazar MD, who is the authorizing prescriber for this visit.  Direct supervision was available by either the patient's PCP or other available provider.    Time and complexity billing metrics are included in the docflowsheet linked to this visit    Time spent: 30 min  Jessica Kaplan, Pharm.D., BCACP   MTM Pharmacist at [facility]

## 2021-06-14 NOTE — PROGRESS NOTES
ASSESSMENT & PLAN      Nav was seen today for abdominal pain.    Diagnoses and all orders for this visit:    Screen for colon cancer  -     Cologuard  -     Occult Blood(ICT)    Constipation, unspecified constipation type    Postprandial abdominal bloating  -     Ambulatory referral to General Surgery  -     Comprehensive Metabolic Panel  -     Lipase  -     HM1(CBC and Differential)  -     HM1 (CBC with Diff)    Cholelithiases  -     Ambulatory referral to General Surgery  -     Comprehensive Metabolic Panel  -     Lipase  -     HM1(CBC and Differential)  -     HM1 (CBC with Diff)    Other orders  -     bisacodyl (DULCOLAX, BISACODYL,) 5 mg EC tablet; Take 2 tablets (10 mg total) by mouth daily as needed for constipation.  -     methylcellulose (CITRUCEL) 500 mg Tab; Take 1 tablet (500 mg total) by mouth daily. With a 8 oz glass of water        No Follow-up on file.           CHIEF COMPLAINT: Nav Laurent had concerns including Abdominal Pain.    [place]: 1.............. had concerns including Abdominal Pain.    1. Screen for colon cancer    2. Constipation, unspecified constipation type    3. Postprandial abdominal bloating    4. Cholelithiases      No problem-specific Assessment & Plan notes found for this encounter.      CC:              Constipation takes a pill daily  Helpful  DUlcolax  Stimulated  FLuid Encouraged  Add Fiber     Other emphysema  COPD is unchanged.  Warning signs of respiratory distress were reviewed with the patient.            Mixed hyperlipidemia  Lipid abnormalities are unchanged.  Nutritional counseling was provided.  Lipids will be reassessed yearly.     Coronary artery disease involving native coronary artery of native heart without angina pectoris  Coronary artery disease is unchanged.  NO CP  No NTG use   Continue current treatment regimen.  Regular aerobic exercise.  Continue current medications.  Cardiac status will be reassessed in 6 months.     Hypertension  Hypertension is improving  with treatment.  Regular aerobic exercise.  Continue current medications.  Blood pressure will be reassessed at the next regular appointment.    Carotid Ultrasound Mild Blockage.       Bloating After greasy Foods --- Tries to eat veggies an not as bad  Vomiting No  Nauseated  No      What's it like:                      Bloating  How long is it ongoing:      Since October   What makes it worse :       greasy  What makes it better:         Plain veggies  0/10-10/10:Pain/Intesity    Pain mild    No  vomiting       Any associated Sx to above complaint:   + Constipation  No Urine Sx    History of Low BG    Novolog 5 with   Lantus 10 a bedtime    History of anemia  On Prilosec  History of PUD    SUBJECTIVE:  Nav Laurent is a 76 y.o. female    Past Medical History:   Diagnosis Date     Alternating esotropia      Anemia      Anemia      Anxiety      Atherosclerosis      CHF (congestive heart failure)      COPD (chronic obstructive pulmonary disease)      Coronary artery disease     status post MI times two with BMS to LAD     Depression      Diabetes mellitus      Dysphagia      Dyspnea      Gall bladder disease      GERD (gastroesophageal reflux disease)      Hyperlipidemia      Hypertension      Ischemic cardiomyopathy      Ischemic cardiomyopathy     EF 20-25% - follows with Dr. Freed     Murmur      Nonproliferative diabetic retinopathy      Occult blood positive stool 05/29/2014     Stenosis of larynx      Stroke      Vitamin D deficiency      Past Surgical History:   Procedure Laterality Date     CARDIAC CATHETERIZATION       ESOPHAGOGASTRODUODENOSCOPY N/A 2/7/2017    Procedure: ESOPHAGOGASTRODUODENOSCOPY ;  Surgeon: Mitchell Mcbride MD;  Location: [facility];  Service:      TRACHEOSTOMY       Review of patient's allergies indicates no known allergies.  Current Outpatient Prescriptions   Medication Sig Dispense Refill     acetaminophen 500 mg coapsule Take 2 capsules by mouth twice daily as needed for back pain  (Patient taking differently: Take 1,000 mg by mouth 2 (two) times a day as needed for pain (back pain). ) 120 capsule 0     albuterol (PROVENTIL) 2.5 mg /3 mL (0.083 %) nebulizer solution Take 3 mL (2.5 mg total) by nebulization every 6 (six) hours as needed for wheezing. 75 mL 12     alcohol swabs (ALCOHOL PREP PADS) PadM USE FOUR TIMES A DAY AS DIRECTED/ SIV 4 ZAUG IB HNUB LI TUS ELIZABETH MOB QHIA 200 each 10     aspirin 325 MG EC tablet Take 1 tablet (325 mg total) by mouth daily. 100 tablet 2     bisacodyl (DULCOLAX, BISACODYL,) 5 mg EC tablet Take 2 tablets (10 mg total) by mouth daily as needed for constipation. 30 tablet 3     blood glucose test strips Test blood sugars four times daily -- before meals and bedtime 200 each 11     blood pressure monitor Kit Check blood pressure daily 1 each 0     carvedilol (COREG) 12.5 MG tablet Take 1 tablet (12.5 mg total) by mouth 2 (two) times a day with meals. Take along with 6.25 mg BID to total 18.75 mg  tablet 1     carvedilol (COREG) 6.25 MG tablet Take 1 tablet (6.25 mg total) by mouth 2 (two) times a day with meals. Take along with 12.5 mg BID to total dose of 18.75 mg twice a day 60 tablet 0     ferrous sulfate 325 (65 FE) MG tablet Take 1 tablet by mouth daily with breakfast.       furosemide (LASIX) 20 MG tablet Take 1 tablet (20 mg total) by mouth daily. As needed for swelling 90 tablet 0     ipratropium-albuterol (COMBIVENT RESPIMAT)  mcg/actuation Mist inhaler Inhale 1 puff 4 (four) times a day. 1 Inhaler 11     ketotifen (ZADITOR/ZYRTEC ITCHY EYES) 0.025 % (0.035 %) ophthalmic solution 1 drop affected eye for itching and watering twice daily as needed 19 mL prn     LANTUS SOLOSTAR 100 unit/mL (3 mL) pen Inject 12 Units under the skin at bedtime. E11.39 15 mL 4     losartan (COZAAR) 100 MG tablet Take 0.5 tablets (50 mg total) by mouth daily. 30 tablet 5     nitroglycerin (NITROSTAT) 0.4 MG SL tablet Place 1 tablet (0.4 mg total) under the tongue  "every 5 (five) minutes as needed for chest pain. 30 tablet 3     NOVOLOG FLEXPEN 100 unit/mL injection pen Inject 4 Units under the skin 3 (three) times a day before meals. 15 mL 4     nut.tx.gluc.intol,lac-free,soy (GLUCERNA SHAKE) Liqd Drink 1 bottle three times daily 90 Bottle 11     omeprazole (PRILOSEC) 20 MG capsule Take 1 capsule (20 mg total) by mouth daily before breakfast. 90 capsule 1     pen needle, diabetic (NOVOFINE 32) 32 gauge x 1/4\" Ndle 32 G x 6 MM - Used to inject Novolog and Lantus insulin. Need 4 needles per day. E11.39 150 each 4     rosuvastatin (CRESTOR) 20 MG tablet Take 1 tablet (20 mg total) by mouth daily. 30 tablet 11     spironolactone (ALDACTONE) 25 MG tablet Take 1 tablet (25 mg total) by mouth daily. 90 tablet 3     carboxymethylcellulose (REFRESH LIQUIGEL) 1 % ophthalmic solution 1 to 2 drops affected eye as needed for dryness (Patient taking differently: Apply 1-2 drops to eye as needed. 1 to 2 drops affected eye as needed for dryness) 30 mL 12     methylcellulose (CITRUCEL) 500 mg Tab Take 1 tablet (500 mg total) by mouth daily. With a 8 oz glass of water 30 tablet 5     No current facility-administered medications for this visit.      No family history on file.  Social History     Social History     Marital status:      Spouse name: N/A     Number of children: N/A     Years of education: N/A     Social History Main Topics     Smoking status: Never Smoker     Smokeless tobacco: Never Used     Alcohol use No     Drug use: No     Sexual activity: Not Currently     Other Topics Concern     None     Social History Narrative    Lives with family     Patient Active Problem List   Diagnosis     Atherosclerosis     Alternating Esotropia     Other emphysema     Murmurs     Vitamin D Deficiency     Mixed hyperlipidemia     Nonproliferative Retinopathy     Acute Myocardial Infarction     Coronary Artery Disease     Ischemic Cardiomyopathy     Ischemic Stroke  left side weakness " "arms/legs     Stenosis Of Larynx     Esophageal Reflux     Anemia     Hyperlipidemia     CKD (chronic kidney disease) stage 3, GFR 30-59 ml/min     ACS (acute coronary syndrome)     Iron deficiency anemia     Myofascial pain syndrome     Lumbago     Joint Pain, Localized in the Shoulder      Cervicalgia     Hypertension     Pain of upper abdomen     Type 2 diabetes mellitus with ophthalmic manifestations     Bruit of right carotid artery     Calculus of gallbladder without cholecystitis     Coronary artery disease involving native coronary artery of native heart without angina pectoris                                              SOCIAL: She  reports that she has never smoked. She has never used smokeless tobacco. She reports that she does not drink alcohol or use illicit drugs.    REVIEW OF SYSTEMS:   Family history not pertinent to chief complaint or presenting problem    Review of systems otherwise negative as requested from patient, except   Those positive ROS outlined and discussed in [place].    OBJECTIVE:  /70 (Patient Site: Right Arm, Patient Position: Sitting, Cuff Size: Adult Regular)  Pulse 68  Resp 16  Ht 4' 8.5\" (1.435 m)  Wt 112 lb 4 oz (50.9 kg)  BMI 24.72 kg/m2    GENERAL:     No acute distress.   Alert and oriented X 3         Physical:    Alt esotropia  Dentures no thrush  No JVD  S1 S2 +S3  Regular  CTA  NT Abd Neg garcia's sign  No edema  ++ PT pulses        ASSESSMENT & PLAN      Nav was seen today for abdominal pain.    Diagnoses and all orders for this visit:    Screen for colon cancer  -     Cologuard  -     Occult Blood(ICT)    Constipation, unspecified constipation type    Postprandial abdominal bloating  -     Ambulatory referral to General Surgery  -     Comprehensive Metabolic Panel  -     Lipase  -     HM1(CBC and Differential)  -     HM1 (CBC with Diff)    Cholelithiases  -     Ambulatory referral to General Surgery  -     Comprehensive Metabolic Panel  -     Lipase  -     " HM1(CBC and Differential)  -     HM1 (CBC with Diff)    Other orders  -     bisacodyl (DULCOLAX, BISACODYL,) 5 mg EC tablet; Take 2 tablets (10 mg total) by mouth daily as needed for constipation.  -     methylcellulose (CITRUCEL) 500 mg Tab; Take 1 tablet (500 mg total) by mouth daily. With a 8 oz glass of water        No Follow-up on file.       Anticipatory Guidance and Symptomatic Cares Discussed   Advised to call back directly if there are further questions, or if these symptoms fail to improve as anticipated or worsen.  Return to clinic if patient has a clinical concern that warrants an exam.        25 Min Total Time, > 50% counseling and coordination of Care    Tomas Salazar MD  Family Medicine   [facility] [address] [phone]

## 2021-06-14 NOTE — PROGRESS NOTES
MTM Follow Up Encounter  ASSESSMENT AND PLAN  Knee Pain: Encouraged her to set up an appointment with a provider in the clinic today to examine her knee.     Type 2 Diabetes:  reasonably well controlled. A1C was not at goal of <7%. FBP overall at goal  mg/dL. I would be surprised that she would drop so low with a 2 unts dose increase, but if that is the case, will try Tresiba. Due to it's longer half-life, it has a lower risk of hypoglycemia. Will keep Novolog dose the same. She was willing to try and to increase to 12 units. Provided her with a sample today.   Will check A1c next week.   Will consider increasing losartan if microalbuminuria remains elevated.   Encouraged her to focus more on vegetables and meat and less rice.   PLAN:   1. Stop Lantus and start Tresiba U-100 and increase to 12 units    Hyperlipidemia: Recommended her to start rosuvastatin, after educating patient on what it is for. It is a smaller size than her atorvastatin. Will recheck cholesterol panel at least 6-8 weeks after starting rosuvastatin.   PLAN:   1. Start rosuvastatin 20 mg HS  2. Check lipid panel in 6-8 weeks    MTM FOLLOW UP  12/6/17    SUBJECTIVE AND OBJECTIVE  Nav Laurent is a 76 y.o. female here for a follow-up medication therapy management (MTM) appointment. A [employer]  joins us today.     Medication Concern(s)/Question(s): Fell on 11/11 and has knee pain. Would like to be seen today if possible.     Medication Adherence: Brought all of her medications today. Delivered from [place]. Son helps her with her medicines.      Knee Pain: Reports that she fell on 11/11 after tripping over her grandchild. Has pain in her left knee which is stable. Is putting Vicks VapoRub on her knee, which helps a little. Not in big pain, but it is there. Pain is getting better.     Type 2 Diabetes: Currently taking Lantus 10 units daily and Novolog 4 units TID. At last MTM appt, Lantus was increased to 12 units but she reports that  after one day of 12 units she felt like her BG dropped too low so she decreased back to 10 units.   Tests BG 1-3 times daily before meals. Blood sugars per glucometer:   AM (5-10am) = 77, 109, 91, 133  Before lunch (10-2pm) = 145, 157, 88, 114, 157, 125, 78, 107, 186, 204, 158, 134, 120, 138  Before dinner (6-9pm) = 259, 227, 218, 190, 223, 325, 224, 301, 250, 90, 340, 260, 248, 259  Bedtime (10-2am) = 241, 158, 312, 238, 303, 339, 273, 435, 195, 315, 83, 248  Last A1c checked 9/5/17 = 7.9%.   Microalbumin checked 2/17/17 ratio = 122.3  Eating rice (brown) all day, but does have vegetables and meat as well     Hyperlipidemia: Brings rosuvastatin 20 mg today. At last MTM appt, atorvastatin 40 mg was changed since they were hard for her to swallow. Rosuvastatin is smaller, but she admits she is not taking because she did not know what it is for. Last lipids checked 9/30/16.          Nav's medication list was reviewed with them, discussing reason for use, directions for use, and potential side effects of each medication as needed. Indication, safety, efficacy, and convenience was assessed for all medications addressed above.  No environmental factors were noted currently affecting patient.  This care plan was communicated via EMR with her primary care provider, Tomas Salazar MD, who is the authorizing prescriber for this visit.  Direct supervision was available by either the patient's PCP or other available provider.    Time and complexity billing metrics are included in the docflowsheet linked to this visit    Time spent: 30 min  Jessica Kaplan, Pharm.D., BCACP   MTM Pharmacist at [facility]

## 2021-06-14 NOTE — PROGRESS NOTES
"ASSESSMENT & PLAN      Nav was seen today for knee injury.    Diagnoses and all orders for this visit:    Knee pain  -     XR Knee Left Plus [place] VW; Future  -     XR Knee Right Plus [place] VW; Future    Injury of left patella, sequela  -     Ambulatory referral for Orthotics / Prosthetics - [place]  -     Ambulatory referral to Physical Therapy    Ceruminosis    Other orders  -     acetic acid-hydrocortisone (VOSOL-HC) otic solution; 3 drops each ear up to four times daily        No Follow-up on file.           CHIEF COMPLAINT: Nav Laurent had concerns including Knee Injury.    [place]: 1.............. had concerns including Knee Injury.    1. Knee pain    2. Injury of left patella, sequela    3. Ceruminosis      No problem-specific Assessment & Plan notes found for this encounter.      CC:             [place] Thanksgiving Day   Tripped over [facility]  Was able to walk \"But Slowly\"  Level 20/10      What's it like:                      Sharp pain  How long is it ongoing:    Since 11/23  What makes it worse :       walk  What makes it better:         rest  0/10-10/10:Pain/Intesity     20/10      Any associated Sx to above complaint:  Gives out  Yes         SUBJECTIVE:  Nav Laurent is a 76 y.o. female    Past Medical History:   Diagnosis Date     Alternating esotropia      Anemia      Anemia      Anxiety      Atherosclerosis      CHF (congestive heart failure)      COPD (chronic obstructive pulmonary disease)      Coronary artery disease     status post MI times two with BMS to LAD     Depression      Diabetes mellitus      Dysphagia      Dyspnea      Gall bladder disease      GERD (gastroesophageal reflux disease)      Hyperlipidemia      Hypertension      Ischemic cardiomyopathy      Ischemic cardiomyopathy     EF 20-25% - follows with Dr. Freed     Murmur      Nonproliferative diabetic retinopathy      Occult blood positive stool 05/29/2014     Stenosis of larynx      Stroke      Vitamin D deficiency      Past " Surgical History:   Procedure Laterality Date     CARDIAC CATHETERIZATION       ESOPHAGOGASTRODUODENOSCOPY N/A 2/7/2017    Procedure: ESOPHAGOGASTRODUODENOSCOPY ;  Surgeon: Mitchell Mcbride MD;  Location: [facility];  Service:      TRACHEOSTOMY       Review of patient's allergies indicates no known allergies.  Current Outpatient Prescriptions   Medication Sig Dispense Refill     acetaminophen 500 mg coapsule Take 2 capsules by mouth twice daily as needed for back pain (Patient taking differently: Take 1,000 mg by mouth 2 (two) times a day as needed for pain (back pain). ) 120 capsule 0     albuterol (PROVENTIL) 2.5 mg /3 mL (0.083 %) nebulizer solution Take 3 mL (2.5 mg total) by nebulization every 6 (six) hours as needed for wheezing. 75 mL 12     alcohol swabs (ALCOHOL PREP PADS) PadM USE FOUR TIMES A DAY AS DIRECTED/ SIV 4 ZAUG IB HNUB LI TUS ELIZABETH MOB QHIA 200 each 10     aspirin 325 MG EC tablet Take 1 tablet (325 mg total) by mouth daily. 100 tablet 2     bisacodyl (DULCOLAX, BISACODYL,) 5 mg EC tablet Take 2 tablets (10 mg total) by mouth daily as needed for constipation. 30 tablet 3     blood glucose test strips Test blood sugars four times daily -- before meals and bedtime 200 each 11     blood pressure monitor Kit Check blood pressure daily 1 each 0     carboxymethylcellulose (REFRESH LIQUIGEL) 1 % ophthalmic solution 1 to 2 drops affected eye as needed for dryness (Patient taking differently: Apply 1-2 drops to eye as needed. 1 to 2 drops affected eye as needed for dryness) 30 mL 12     carvedilol (COREG) 12.5 MG tablet Take 1 tablet (12.5 mg total) by mouth 2 (two) times a day with meals. Take along with 6.25 mg BID to total 18.75 mg  tablet 1     carvedilol (COREG) 6.25 MG tablet Take 1 tablet (6.25 mg total) by mouth 2 (two) times a day with meals. Take along with 12.5 mg BID to total dose of 18.75 mg twice a day 60 tablet 0     ferrous sulfate 325 (65 FE) MG tablet Take 1 tablet by mouth  "daily with breakfast.       gabapentin (NEURONTIN) 100 MG capsule Take 100 mg by mouth every morning.       INSULIN DEGLUDEC (TRESIBA FLEXTOUCH U-100 SUBQ) Inject 12 Units under the skin daily.       ipratropium-albuterol (COMBIVENT RESPIMAT)  mcg/actuation Mist inhaler Inhale 1 puff 4 (four) times a day. 1 Inhaler 11     ketotifen (ZADITOR/ZYRTEC ITCHY EYES) 0.025 % (0.035 %) ophthalmic solution 1 drop affected eye for itching and watering twice daily as needed 19 mL prn     losartan (COZAAR) 50 MG tablet Take 1 tablet by mouth daily.  5     methylcellulose (CITRUCEL) 500 mg Tab Take 1 tablet (500 mg total) by mouth daily. With a 8 oz glass of water 30 tablet 5     nitroglycerin (NITROSTAT) 0.4 MG SL tablet Place 1 tablet (0.4 mg total) under the tongue every 5 (five) minutes as needed for chest pain. 30 tablet 3     NOVOLOG FLEXPEN 100 unit/mL injection pen Inject 4 Units under the skin 3 (three) times a day before meals. 15 mL 4     nut.tx.gluc.intol,lac-free,soy (GLUCERNA SHAKE) Liqd Drink 1 bottle three times daily 90 Bottle 11     omeprazole (PRILOSEC) 20 MG capsule Take 1 capsule (20 mg total) by mouth daily before breakfast. 90 capsule 1     pen needle, diabetic (NOVOFINE 32) 32 gauge x 1/4\" Ndle 32 G x 6 MM - Used to inject Novolog and Lantus insulin. Need 4 needles per day. E11.39 150 each 4     rosuvastatin (CRESTOR) 20 MG tablet Take 1 tablet (20 mg total) by mouth daily. 30 tablet 11     spironolactone (ALDACTONE) 25 MG tablet Take 1 tablet (25 mg total) by mouth daily. 90 tablet 3     acetic acid-hydrocortisone (VOSOL-HC) otic solution 3 drops each ear up to four times daily 10 mL 3     No current facility-administered medications for this visit.      No family history on file.  Social History     Social History     Marital status:      Spouse name: N/A     Number of children: N/A     Years of education: N/A     Social History Main Topics     Smoking status: Never Smoker     Smokeless " "tobacco: Never Used     Alcohol use No     Drug use: No     Sexual activity: Not Currently     Other Topics Concern     None     Social History Narrative    Lives with family     Patient Active Problem List   Diagnosis     Atherosclerosis     Alternating Esotropia     Other emphysema     Murmurs     Vitamin D Deficiency     Mixed hyperlipidemia     Nonproliferative Retinopathy     Acute Myocardial Infarction     Coronary Artery Disease     Ischemic Cardiomyopathy     Ischemic Stroke  left side weakness arms/legs     Stenosis Of Larynx     Esophageal Reflux     Anemia     Hyperlipidemia     CKD (chronic kidney disease) stage 3, GFR 30-59 ml/min     ACS (acute coronary syndrome)     Iron deficiency anemia     Myofascial pain syndrome     Lumbago     Joint Pain, Localized in the Shoulder      Cervicalgia     Hypertension     Pain of upper abdomen     Type 2 diabetes mellitus with ophthalmic manifestations     Bruit of right carotid artery     Calculus of gallbladder without cholecystitis     Coronary artery disease involving native coronary artery of native heart without angina pectoris                                              SOCIAL: She  reports that she has never smoked. She has never used smokeless tobacco. She reports that she does not drink alcohol or use illicit drugs.    REVIEW OF SYSTEMS:   Family history not pertinent to chief complaint or presenting problem    Review of systems otherwise negative as requested from patient, except   Those positive ROS outlined and discussed in [place].    OBJECTIVE:  /68 (Patient Site: Left Arm, Patient Position: Sitting, Cuff Size: Adult Regular)  Pulse 64  Ht 4' 8.5\" (1.435 m)  Wt 114 lb (51.7 kg)  BMI 25.11 kg/m2    GENERAL:     No acute distress.   Alert and oriented X 3         Physical:    Small effusion left knee  Contusion across the anterior lower portion of the kneecap  Questionable step-off of the lower portion of the kneecap  Mild tenderness to " palpation across the patellar tendon  Tenderness with forced extension at the knee  X-ray reviewed looks like a medial condyle tibial plateau fracture  Calves are supple  Mild joint line tenderness bilaterally  Some crepitus with passive major range of motion bilaterally  Mild tenderness to palpation across her right knee        ASSESSMENT & PLAN      Nav was seen today for knee injury.    Diagnoses and all orders for this visit:    Knee pain  -     XR Knee Left Plus [place] VW; Future  -     XR Knee Right Plus [place] VW; Future    Injury of left patella, sequela  -     Ambulatory referral for Orthotics / Prosthetics - [place]  -     Ambulatory referral to Physical Therapy    Ceruminosis    Other orders  -     acetic acid-hydrocortisone (VOSOL-HC) otic solution; 3 drops each ear up to four times daily        No Follow-up on file.       Anticipatory Guidance and Symptomatic Cares Discussed   Advised to call back directly if there are further questions, or if these symptoms fail to improve as anticipated or worsen.  Return to clinic if patient has a clinical concern that warrants an exam.        25 Min Total Time, > 50% counseling and coordination of Care    Tomas Salazar MD  Family Medicine   [facility] [address] [phone]

## 2021-06-14 NOTE — PROGRESS NOTES
I was consulted by Tomas Salazar MD   to evaluate this patients gall bladder.    HPI: Nav Laurent is a 76 y.o. female who has been experiencing some problems with abdominal bloating after eating. she has been noting this for about 1 year. It has been occurring several times per week. It is not associated with nausea or vomiting.  She seems to struggle with constipation as well.    Allergies:Review of patient's allergies indicates no known allergies.    Past Medical History:   Diagnosis Date     Alternating esotropia      Anemia      Anemia      Anxiety      Atherosclerosis      CHF (congestive heart failure)      COPD (chronic obstructive pulmonary disease)      Coronary artery disease     status post MI times two with BMS to LAD     Depression      Diabetes mellitus      Dysphagia      Dyspnea      Gall bladder disease      GERD (gastroesophageal reflux disease)      Hyperlipidemia      Hypertension      Ischemic cardiomyopathy      Ischemic cardiomyopathy     EF 20-25% - follows with Dr. Freed     Murmur      Nonproliferative diabetic retinopathy      Occult blood positive stool 05/29/2014     Stenosis of larynx      Stroke      Vitamin D deficiency        Past Surgical History:   Procedure Laterality Date     CARDIAC CATHETERIZATION       ESOPHAGOGASTRODUODENOSCOPY N/A 2/7/2017    Procedure: ESOPHAGOGASTRODUODENOSCOPY ;  Surgeon: Mitchell Mcbride MD;  Location: [facility];  Service:      TRACHEOSTOMY         CURRENT MEDS:    Current Outpatient Prescriptions:      albuterol (PROVENTIL) 2.5 mg /3 mL (0.083 %) nebulizer solution, Take 3 mL (2.5 mg total) by nebulization every 6 (six) hours as needed for wheezing., Disp: 75 mL, Rfl: 12     alcohol swabs (ALCOHOL PREP PADS) PadM, USE FOUR TIMES A DAY AS DIRECTED/ SIV 4 ZAUG IB HNUB LI TUS ELIZABETH MOB QHIA, Disp: 200 each, Rfl: 10     aspirin 325 MG EC tablet, Take 1 tablet (325 mg total) by mouth daily., Disp: 100 tablet, Rfl: 2     bisacodyl (DULCOLAX,  BISACODYL,) 5 mg EC tablet, Take 2 tablets (10 mg total) by mouth daily as needed for constipation., Disp: 30 tablet, Rfl: 3     blood glucose test strips, Test blood sugars four times daily -- before meals and bedtime, Disp: 200 each, Rfl: 11     blood pressure monitor Kit, Check blood pressure daily, Disp: 1 each, Rfl: 0     carboxymethylcellulose (REFRESH LIQUIGEL) 1 % ophthalmic solution, 1 to 2 drops affected eye as needed for dryness (Patient taking differently: Apply 1-2 drops to eye as needed. 1 to 2 drops affected eye as needed for dryness), Disp: 30 mL, Rfl: 12     carvedilol (COREG) 12.5 MG tablet, Take 1 tablet (12.5 mg total) by mouth 2 (two) times a day with meals. Take along with 6.25 mg BID to total 18.75 mg BID, Disp: 180 tablet, Rfl: 1     carvedilol (COREG) 6.25 MG tablet, Take 1 tablet (6.25 mg total) by mouth 2 (two) times a day with meals. Take along with 12.5 mg BID to total dose of 18.75 mg twice a day, Disp: 60 tablet, Rfl: 0     ferrous sulfate 325 (65 FE) MG tablet, Take 1 tablet by mouth daily with breakfast., Disp: , Rfl:      gabapentin (NEURONTIN) 100 MG capsule, Take 1 capsule by mouth 3 (three) times a day., Disp: , Rfl: 3     ipratropium-albuterol (COMBIVENT RESPIMAT)  mcg/actuation Mist inhaler, Inhale 1 puff 4 (four) times a day., Disp: 1 Inhaler, Rfl: 11     ketotifen (ZADITOR/ZYRTEC ITCHY EYES) 0.025 % (0.035 %) ophthalmic solution, 1 drop affected eye for itching and watering twice daily as needed, Disp: 19 mL, Rfl: prn     LANTUS SOLOSTAR 100 unit/mL (3 mL) pen, Inject 12 Units under the skin at bedtime. E11.39, Disp: 15 mL, Rfl: 4     losartan (COZAAR) 50 MG tablet, Take 1 tablet by mouth daily., Disp: , Rfl: 5     nitroglycerin (NITROSTAT) 0.4 MG SL tablet, Place 1 tablet (0.4 mg total) under the tongue every 5 (five) minutes as needed for chest pain., Disp: 30 tablet, Rfl: 3     NOVOLOG FLEXPEN 100 unit/mL injection pen, Inject 4 Units under the skin 3 (three) times a  "day before meals., Disp: 15 mL, Rfl: 4     nut.tx.gluc.intol,lac-free,soy (GLUCERNA SHAKE) Liqd, Drink 1 bottle three times daily, Disp: 90 Bottle, Rfl: 11     omeprazole (PRILOSEC) 20 MG capsule, Take 1 capsule (20 mg total) by mouth daily before breakfast., Disp: 90 capsule, Rfl: 1     pen needle, diabetic (NOVOFINE 32) 32 gauge x 1/4\" Ndle, 32 G x 6 MM - Used to inject Novolog and Lantus insulin. Need 4 needles per day. E11.39, Disp: 150 each, Rfl: 4     rosuvastatin (CRESTOR) 20 MG tablet, Take 1 tablet (20 mg total) by mouth daily., Disp: 30 tablet, Rfl: 11     spironolactone (ALDACTONE) 25 MG tablet, Take 1 tablet (25 mg total) by mouth daily., Disp: 90 tablet, Rfl: 3     acetaminophen 500 mg coapsule, Take 2 capsules by mouth twice daily as needed for back pain (Patient taking differently: Take 1,000 mg by mouth 2 (two) times a day as needed for pain (back pain). ), Disp: 120 capsule, Rfl: 0     methylcellulose (CITRUCEL) 500 mg Tab, Take 1 tablet (500 mg total) by mouth daily. With a 8 oz glass of water, Disp: 30 tablet, Rfl: 5    History reviewed. No pertinent family history.     reports that she has never smoked. She has never used smokeless tobacco. She reports that she does not drink alcohol or use illicit drugs.    Review of Systems:  10 system were reviewed and all are within normal limits except for those listed in the HPI.      EXAM:  /63  Pulse 68  Resp 18  Ht 4' 8.5\" (1.435 m)  Wt 116 lb (52.6 kg)  BMI 25.55 kg/m2  GENERAL: Well developed female   EYES: Anicteric Sclera,  EOMI  CARDIAC: RRR w/out murmur  CHEST/LUNG: Clear to ascultation, No wheezes  ABDOMEN: Soft with, +Bowel Sounds, upper midline scar.  NEURO:No focal deficits, ambulatory  LYMPH: No Axillary or inguinal Adenopathy  EXTREMITIES: Ambulatory, No lower extremity deformities      LABS:  Lab Results   Component Value Date    WBC 4.8 11/21/2017    WBC 6.6 07/09/2014    HGB 10.5 (L) 11/21/2017    HCT 32.1 (L) 11/21/2017    MCV " 87 11/21/2017     11/21/2017     INR/Prothrombin Time    Results from last 7 days  Lab Units 11/21/17  1523   LN-SODIUM mmol/L 140   LN-POTASSIUM mmol/L 5.6*   LN-CHLORIDE mmol/L 109*   LN-CO2 mmol/L 23   LN-BLOOD UREA NITROGEN mg/dL 23   LN-CREATININE mg/dL 1.18*   LN-CALCIUM mg/dL 9.7     Lab Results   Component Value Date    ALT 12 11/21/2017    AST 18 11/21/2017    ALKPHOS 54 11/21/2017    BILITOT 0.4 11/21/2017       IMAGES:   I reviewed the US and see the presence of Gall Stones  US ABDOMEN LIMITED  1/12/2017 11:47 AM     INDICATION: Abdominal Pain  COMPARISON: None.     FINDINGS:  GALLBLADDER: Cholelithiasis. No signs of cholecystitis.  BILE DUCTS: No bile duct dilation. The common hepatic duct measures 3 mm.  LIVER: Normal where seen.  RIGHT KIDNEY: No hydronephrosis.  PANCREAS: Not imaged in detail on this limited study. No incidental abnormalities.     Right pleural effusion noted. No ascites in the right upper quadrant.     IMPRESSION:   CONCLUSION:  1.  Cholelithiasis.  2.  Right pleural effusion.    Assessment/Plan: Pt with signs and symptoms consistent with chronic cholecystitis. I have recommended a cholecystectomy. I discussed with her the plan to do this laparoscopically understanding the possibility of needing to convert to an open operation. I went over some of the risks of surgery including but not limited to bleeding, infection and bile duct injury. I also discussed the outpatient nature of the surgery and the expected recovery time.  The pt did have a prior surgery where they did a surgery she believes on her stomach.       Mitchell Mcbride MD  [phone]  [facility] Department of Surgery

## 2021-06-16 NOTE — PROGRESS NOTES
ASSESSMENT & PLAN      Nav was seen today for fatigue and diabetes.    Diagnoses and all orders for this visit:    Dyspnea  -     BNP(B-type Natriuretic Peptide)  -     HM1(CBC and Differential)  -     Iron and Transferrin Iron Binding Capacity  -     Ferritin  -     Comprehensive Metabolic Panel  -     Thyroid Cascade  -     Troponin I  -     HM1 (CBC with Diff)  -     Electrocardiogram Perform and Read    Anemia  -     HM1(CBC and Differential)  -     Iron and Transferrin Iron Binding Capacity  -     Ferritin  -     HM1 (CBC with Diff)    CAD (coronary artery disease)  -     Troponin I  -     Electrocardiogram Perform and Read    CHF (congestive heart failure)  -     Electrocardiogram Perform and Read    Cardiac LV ejection fraction 10-20%  -     Electrocardiogram Perform and Read    LBBB (left bundle branch block)  -     Electrocardiogram Perform and Read    Other orders  -     carvedilol (COREG) 12.5 MG tablet; Take 2 tablets (25 mg total) by mouth 2 (two) times a day with meals. Take along with 6.25 mg BID to total 18.75 mg BID  -     isosorbide mononitrate (IMDUR) 30 MG 24 hr tablet; Take 1 tablet (30 mg total) by mouth daily.        No Follow-up on file.           CHIEF COMPLAINT: Nav Laurent had concerns including Fatigue and Diabetes.    [place]: 1.............. had concerns including Fatigue and Diabetes.    1. Dyspnea    2. Anemia    3. CAD (coronary artery disease)    4. CHF (congestive heart failure)    5. Cardiac LV ejection fraction 10-20%    6. LBBB (left bundle branch block)      No problem-specific Assessment & Plan notes found for this encounter.      CC:              SOB and fatigue    Since 2017  Outside out of breath    IN Home OK  Laying Flat?  Swelling ++    Weak from Pelvis to legs    Echo Complete   Order# [id number]   [place] physician: Travis Ivy MD Ordering physician: Zoya Lakhani MD Study date: 17   Patient Information   Patient Name MRN Sex              Age   Nav Laurent  [id number] Female 1940 (77 y.o.)   Indications   Congestive heart failure; systolic dysfunction   Summary     Left ventricle ejection fraction is severely decreased. The calculated left ventricular ejection fraction is 19%.    Akinesis involving the entire anterior, anteroseptal, septal and apical segments    Mild mitral regurgitation    Pleural effusion is present           What's it like:                      Dry and sometimes productive cough with exertion and laying flat  How long is it ongoin mos  What makes it worse :       Laying flat and exertion   What makes it better:         Rest   0/10-10/10:Pain/Intesity     troublesome      Any associated Sx to above complaint:  dyspnea        SUBJECTIVE:  Nav Laurent is a 77 y.o. female    Past Medical History:   Diagnosis Date     Alternating esotropia      Anemia      Anemia      Anxiety      Atherosclerosis      CHF (congestive heart failure)      COPD (chronic obstructive pulmonary disease)      Coronary artery disease     status post MI times two with BMS to LAD     Depression      Diabetes mellitus      Dysphagia      Dyspnea      Gall bladder disease      GERD (gastroesophageal reflux disease)      Hyperlipidemia      Hypertension      Ischemic cardiomyopathy      Ischemic cardiomyopathy     EF 20-25% - follows with Dr. Freed     Murmur      Nonproliferative diabetic retinopathy      Occult blood positive stool 2014     Stenosis of larynx      Stroke      Vitamin D deficiency      Past Surgical History:   Procedure Laterality Date     CARDIAC CATHETERIZATION       ESOPHAGOGASTRODUODENOSCOPY N/A 2017    Procedure: ESOPHAGOGASTRODUODENOSCOPY ;  Surgeon: Mitchell Mcbride MD;  Location: [facility] [place];  Service:      TRACHEOSTOMY       Review of patient's allergies indicates no known allergies.  Current Outpatient Prescriptions   Medication Sig Dispense Refill     acetaminophen 500 mg coapsule Take 2 capsules by mouth twice daily as needed  for back pain (Patient taking differently: Take 1,000 mg by mouth 2 (two) times a day as needed for pain (back pain). ) 120 capsule 0     acetic acid-hydrocortisone (VOSOL-HC) otic solution 3 drops each ear up to four times daily 10 mL 3     albuterol (PROVENTIL) 2.5 mg /3 mL (0.083 %) nebulizer solution Take 3 mL (2.5 mg total) by nebulization every 6 (six) hours as needed for wheezing. 75 mL 12     alcohol swabs (ALCOHOL PREP PADS) PadM USE FOUR TIMES A DAY AS DIRECTED/ SIV 4 ZAUG IB HNUB LI TUS ELIZABETH MOB QHIA 200 each 10     aspirin 325 MG EC tablet Take 1 tablet (325 mg total) by mouth daily. 100 tablet 2     bisacodyl (DULCOLAX, BISACODYL,) 5 mg EC tablet Take 2 tablets (10 mg total) by mouth daily as needed for constipation. 30 tablet 3     blood pressure monitor Kit Check blood pressure daily 1 each 0     carboxymethylcellulose (REFRESH LIQUIGEL) 1 % ophthalmic solution 1 to 2 drops affected eye as needed for dryness (Patient taking differently: Apply 1-2 drops to eye as needed. 1 to 2 drops affected eye as needed for dryness) 30 mL 12     carvedilol (COREG) 12.5 MG tablet Take 2 tablets (25 mg total) by mouth 2 (two) times a day with meals. Take along with 6.25 mg BID to total 18.75 mg  tablet 1     ferrous sulfate 325 (65 FE) MG tablet Take 1 tablet (325 mg total) by mouth daily with breakfast. 30 tablet 2     furosemide (LASIX) 20 MG tablet TAKE 1 PILL (20 MG TOTAL) BY MOUTH DAILY / TXHUA HNUB NOJ 1 LUB TSHUAJ PAB BON PHOB VOG 90 tablet 0     gabapentin (NEURONTIN) 100 MG capsule Take 100 mg by mouth every morning.       gabapentin (NEURONTIN) 100 MG capsule TAKE 1 PILL BY MOUTH 3 TIMES EVERYDAY/ TXHUA HNUB NOJ 1 LUB TSHUAJ 3 ZAUG IB HNUB PAB BON MOB LEEG 90 capsule 10     INSULIN DEGLUDEC (TRESIBA FLEXTOUCH U-100 SUBQ) Inject 10 Units under the skin daily.       ipratropium-albuterol (COMBIVENT RESPIMAT)  mcg/actuation Mist inhaler Inhale 1 puff 4 (four) times a day as needed. 1 Inhaler 11      "isosorbide mononitrate (IMDUR) 30 MG 24 hr tablet Take 1 tablet (30 mg total) by mouth daily. 30 tablet 11     ketotifen (ZADITOR/ZYRTEC ITCHY EYES) 0.025 % (0.035 %) ophthalmic solution 1 drop affected eye for itching and watering twice daily as needed 19 mL prn     losartan (COZAAR) 50 MG tablet Take 1 tablet by mouth daily.  5     methylcellulose (CITRUCEL) 500 mg Tab Take 1 tablet (500 mg total) by mouth daily. With a 8 oz glass of water 30 tablet 5     nitroglycerin (NITROSTAT) 0.4 MG SL tablet Place 1 tablet (0.4 mg total) under the tongue every 5 (five) minutes as needed for chest pain. 30 tablet 3     NOVOLOG FLEXPEN 100 unit/mL injection pen Inject 7 Units under the skin 3 (three) times a day before meals. 15 mL 4     nut.tx.gluc.intol,lac-free,soy (GLUCERNA SHAKE) Liqd Drink 1 bottle three times daily 90 Bottle 11     omeprazole (PRILOSEC) 20 MG capsule Take 1 capsule (20 mg total) by mouth daily before breakfast. 90 capsule 1     omeprazole (PRILOSEC) 20 MG capsule Take 1 capsule (20 mg total) by mouth 2 (two) times a day before meals. 180 capsule 2     ONETOUCH ULTRA TEST strips TEST BLOOD SUGARS FOUR TIMES DAILY -- BEFORE MEALS AND BEDTIME 200 strip 11     pen needle, diabetic (NOVOFINE 32) 32 gauge x 1/4\" Ndle 32 G x 6 MM - Used to inject Novolog and Lantus insulin. Need 4 needles per day. E11.39 150 each 4     rosuvastatin (CRESTOR) 20 MG tablet Take 1 tablet (20 mg total) by mouth daily. 30 tablet 11     spironolactone (ALDACTONE) 25 MG tablet Take 1 tablet (25 mg total) by mouth daily. 90 tablet 3     No current facility-administered medications for this visit.      No family history on file.  Social History     Social History     Marital status:      Spouse name: N/A     Number of children: N/A     Years of education: N/A     Social History Main Topics     Smoking status: Never Smoker     Smokeless tobacco: Never Used     Alcohol use No     Drug use: No     Sexual activity: Not Currently " "    Other Topics Concern     Not on file     Social History Narrative    Lives with family     Patient Active Problem List   Diagnosis     Atherosclerosis     Alternating Esotropia     Other emphysema     Murmurs     Vitamin D Deficiency     Mixed hyperlipidemia     Nonproliferative Retinopathy     Acute Myocardial Infarction     Coronary Artery Disease     Ischemic Cardiomyopathy     Ischemic Stroke  left side weakness arms/legs     Stenosis Of Larynx     Esophageal Reflux     Anemia     Hyperlipidemia     CKD (chronic kidney disease) stage 3, GFR 30-59 ml/min     ACS (acute coronary syndrome)     Iron deficiency anemia     Myofascial pain syndrome     Lumbago     Joint Pain, Localized in the Shoulder      Cervicalgia     Hypertension     Pain of upper abdomen     Type 2 diabetes mellitus with ophthalmic manifestations     Bruit of right carotid artery     Calculus of gallbladder without cholecystitis     Coronary artery disease involving native coronary artery of native heart without angina pectoris     LBBB (left bundle branch block)                                              SOCIAL: She  reports that she has never smoked. She has never used smokeless tobacco. She reports that she does not drink alcohol or use illicit drugs.    REVIEW OF SYSTEMS:   Family history not pertinent to chief complaint or presenting problem    Review of systems otherwise negative as requested from patient, except   Those positive ROS outlined and discussed in [place].    OBJECTIVE:  /77 (Patient Site: Left Arm, Patient Position: Sitting, Cuff Size: Adult Regular)  Pulse 77  Ht 4' 8.5\" (1.435 m)  Wt 114 lb (51.7 kg)  SpO2 92%  BMI 25.11 kg/m2    GENERAL:     No acute distress.   Alert and oriented X 3         Physical:  Jugular venous distention  No appreciable carotid bruits  Lungs slight crackles right base  Cardiac S1-S2 positive S3  Review of her old echo ejection fraction 19%  No appreciable hepatosplenomegaly  Trace edema " lower extremities extremities with sock line edema and shiny skin  Palms are pale  Mucous membranes are pale no foot ulcerations      ASSESSMENT & PLAN      Nav was seen today for fatigue and diabetes.    Diagnoses and all orders for this visit:    Dyspnea  -     BNP(B-type Natriuretic Peptide)  -     HM1(CBC and Differential)  -     Iron and Transferrin Iron Binding Capacity  -     Ferritin  -     Comprehensive Metabolic Panel  -     Thyroid Cascade  -     Troponin I  -     HM1 (CBC with Diff)  -     Electrocardiogram Perform and Read    Anemia  -     HM1(CBC and Differential)  -     Iron and Transferrin Iron Binding Capacity  -     Ferritin  -     HM1 (CBC with Diff)    CAD (coronary artery disease)  -     Troponin I  -     Electrocardiogram Perform and Read    CHF (congestive heart failure)  -     Electrocardiogram Perform and Read    Cardiac LV ejection fraction 10-20%  -     Electrocardiogram Perform and Read    LBBB (left bundle branch block)  -     Electrocardiogram Perform and Read    Other orders  -     carvedilol (COREG) 12.5 MG tablet; Take 2 tablets (25 mg total) by mouth 2 (two) times a day with meals. Take along with 6.25 mg BID to total 18.75 mg BID  -     isosorbide mononitrate (IMDUR) 30 MG 24 hr tablet; Take 1 tablet (30 mg total) by mouth daily.      Bee Please make the following     Change:    Carvedilol to 12.5   2 in AM and 2 in PM  Furosemide 20mg  2 in AM and 1 PM    Stop the Carvedilol 6.25     Add:    Imdur or Isosorbide mononitrate 30 mg in AM      Recheck here [place] in AM Monday      No Follow-up on file.       Anticipatory Guidance and Symptomatic Cares Discussed   Advised to call back directly if there are further questions, or if these symptoms fail to improve as anticipated or worsen.  Return to clinic if patient has a clinical concern that warrants an exam.        25  Min Total Time, > 50% counseling and coordination of Care    Tomas Salazar MD  [facility]  [facility] [address]  [phone]

## 2021-06-16 NOTE — PROGRESS NOTES
[facility]  OFFICE VISIT - FAMILY MEDICINE     ASSESSMENT AND PLAN     1. Shortness of breath  Patient very ill-appearing in clinic today. Known CHF with EF 10-20%, CAD, DM, CKD. SOB at rest and MUSE. Fatigued and weak. Not eating at home. Worsening condition despite attempts at titration of home meds including lasix, carvedilol. Discussed with PCP Dr. Salazar, recommends ED and hospital admission for further evaluation & treatment. Paramedics called from clinic today.    2. Weakness     3. Decreased appetite     4. Cardiac LV ejection fraction 10-20%         CHIEF COMPLAINT     Follow-up (f/u from last visit ); Shortness of Breath (pt states shortness of breath all the time ); Constipation (pt states can't eat due to constipation ); and Extremity Weakness (pt states legs and hips are not that strong anymore )    HPI     Nav Laurent is a 77 y.o. female with past medical history as below who presents today for follow up visit. Seen last 2/26/18 with Dr. Salazar, medications adjusted to carvedilol 25mg BID and furosemide 40mg daily. Pt reports feeling much worse today. Reports worsening shortness of breath, fatigue, weakness. Reports not eating well at home. Denies CP. Some cough. No fevers, chills, nausea, vomiting or diarrhea. Here today with .       Review of Systems  12-point review of systems completed and as per HPI, otherwise negative.     PMSH     Past Medical History:   Diagnosis Date     Alternating esotropia      Anemia      Anemia      Anxiety      Atherosclerosis      CHF (congestive heart failure)      COPD (chronic obstructive pulmonary disease)      Coronary artery disease     status post MI times two with BMS to LAD     Depression      Diabetes mellitus      Dysphagia      Dyspnea      Gall bladder disease      GERD (gastroesophageal reflux disease)      Hyperlipidemia      Hypertension      Ischemic cardiomyopathy      Ischemic cardiomyopathy     EF 20-25% - follows with  Dr. Freed     Murmur      Nonproliferative diabetic retinopathy      Occult blood positive stool 05/29/2014     Stenosis of larynx      Stroke      Vitamin D deficiency      Past Surgical History:   Procedure Laterality Date     CARDIAC CATHETERIZATION       ESOPHAGOGASTRODUODENOSCOPY N/A 2/7/2017    Procedure: ESOPHAGOGASTRODUODENOSCOPY ;  Surgeon: Mitchell Mcbride MD;  Location: [facility];  Service:      TRACHEOSTOMY         PFSH   No family history on file.  Social History     Social History     Marital status:      Spouse name: N/A     Number of children: N/A     Years of education: N/A     Occupational History     Not on file.     Social History Main Topics     Smoking status: Never Smoker     Smokeless tobacco: Never Used     Alcohol use No     Drug use: No     Sexual activity: Not Currently     Other Topics Concern     Not on file     Social History Narrative    Lives with family     Relevant history was reviewed with the patient today, unless noted in HPI, is not pertinent for this visit.    MEDICATIONS     Current Outpatient Prescriptions on File Prior to Visit   Medication Sig Dispense Refill     acetaminophen 500 mg coapsule Take 2 capsules by mouth twice daily as needed for back pain (Patient taking differently: Take 1,000 mg by mouth 2 (two) times a day as needed for pain (back pain). ) 120 capsule 0     acetic acid-hydrocortisone (VOSOL-HC) otic solution 3 drops each ear up to four times daily 10 mL 3     albuterol (PROVENTIL) 2.5 mg /3 mL (0.083 %) nebulizer solution Take 3 mL (2.5 mg total) by nebulization every 6 (six) hours as needed for wheezing. 75 mL 12     alcohol swabs (ALCOHOL PREP PADS) PadM USE FOUR TIMES A DAY AS DIRECTED/ SIV 4 ZAUG IB HNUB LI TUS ELIZABETH MOB QHIA 200 each 10     aspirin 325 MG EC tablet Take 1 tablet (325 mg total) by mouth daily. 100 tablet 2     bisacodyl (DULCOLAX, BISACODYL,) 5 mg EC tablet Take 2 tablets (10 mg total) by mouth daily as needed for  constipation. 30 tablet 3     blood pressure monitor Kit Check blood pressure daily 1 each 0     carboxymethylcellulose (REFRESH LIQUIGEL) 1 % ophthalmic solution 1 to 2 drops affected eye as needed for dryness (Patient taking differently: Apply 1-2 drops to eye as needed. 1 to 2 drops affected eye as needed for dryness) 30 mL 12     carvedilol (COREG) 25 MG tablet Take 1 tablet (25 mg total) by mouth 2 (two) times a day with meals. 180 tablet 1     ferrous sulfate 325 (65 FE) MG tablet TAKE 1 TABLET (325 MG TOTAL) BY MOUTH DAILY WITH BREAKFAST. 30 tablet 11     furosemide (LASIX) 20 MG tablet TAKE 2 PILL (20 MG TOTAL) BY MOUTH DAILY / TXHUA HNUB NOJ 1 LUB TSHUAJ PAB BON PHOB  tablet 0     gabapentin (NEURONTIN) 100 MG capsule Take 100 mg by mouth every morning.       gabapentin (NEURONTIN) 100 MG capsule TAKE 1 PILL BY MOUTH 3 TIMES EVERYDAY/ TXHUA HNUB NOJ 1 LUB TSHUAJ 3 ZAUG IB HNUB PAB BON MOB LEEG 90 capsule 10     INSULIN DEGLUDEC (TRESIBA FLEXTOUCH U-100 SUBQ) Inject 10 Units under the skin daily.       ipratropium-albuterol (COMBIVENT RESPIMAT)  mcg/actuation Mist inhaler Inhale 1 puff 4 (four) times a day as needed. 1 Inhaler 11     isosorbide mononitrate (IMDUR) 30 MG 24 hr tablet Take 1 tablet (30 mg total) by mouth daily. 30 tablet 11     ketotifen (ZADITOR/ZYRTEC ITCHY EYES) 0.025 % (0.035 %) ophthalmic solution 1 drop affected eye for itching and watering twice daily as needed 19 mL prn     losartan (COZAAR) 50 MG tablet Take 1 tablet by mouth daily.  5     methylcellulose (CITRUCEL) 500 mg Tab Take 1 tablet (500 mg total) by mouth daily. With a 8 oz glass of water 30 tablet 5     nitroglycerin (NITROSTAT) 0.4 MG SL tablet Place 1 tablet (0.4 mg total) under the tongue every 5 (five) minutes as needed for chest pain. 30 tablet 3     NOVOLOG FLEXPEN 100 unit/mL injection pen Inject 7 Units under the skin 3 (three) times a day before meals. 15 mL 4     nut.tx.gluc.intol,lac-free,soy  "(GLUCERNA SHAKE) Liqd Drink 1 bottle three times daily 90 Bottle 11     omeprazole (PRILOSEC) 20 MG capsule Take 1 capsule (20 mg total) by mouth daily before breakfast. 90 capsule 1     omeprazole (PRILOSEC) 20 MG capsule Take 1 capsule (20 mg total) by mouth 2 (two) times a day before meals. 180 capsule 2     ONETOUCH ULTRA TEST strips TEST BLOOD SUGARS FOUR TIMES DAILY -- BEFORE MEALS AND BEDTIME 200 strip 11     pen needle, diabetic (NOVOFINE 32) 32 gauge x 1/4\" Ndle 32 G x 6 MM - Used to inject Novolog and Lantus insulin. Need 4 needles per day. E11.39 150 each 4     rosuvastatin (CRESTOR) 20 MG tablet Take 1 tablet (20 mg total) by mouth daily. 30 tablet 11     spironolactone (ALDACTONE) 25 MG tablet Take 1 tablet (25 mg total) by mouth daily. 90 tablet 3     No current facility-administered medications on file prior to visit.        OBJECTIVE   /80 (Patient Site: Left Arm, Patient Position: Sitting, Cuff Size: Adult Regular)  Pulse 68  Resp 16  SpO2 92%  Breastfeeding? No    Wt Readings from Last 3 Encounters:   02/26/18 107 lb (48.5 kg)   02/22/18 114 lb (51.7 kg)   11/30/17 114 lb (51.7 kg)     Physical Exam  Physical Examination: General appearance - ill appearing, dyspneic, pale  Mental status - alert, oriented to person, place, and time  Chest - no wheezes, + rhonchi, symmetric air entry  Heart - normal rate, regular rhythm, normal S1, S2, no murmurs, rubs, clicks or gallops  Neurological - alert, oriented, normal speech, no focal findings or movement disorder noted  Musculoskeletal - no joint tenderness, deformity or swelling  Extremities - peripheral pulses normal, +1 edema BLE, no clubbing or cyanosis  Skin - normal coloration and turgor, no rashes, no suspicious skin lesions noted      RESULTS/CONSULTS (Lab/Radiology)   No results found for this or any previous visit (from the past 168 hour(s)).    HEALTH MAINTENANCE / SCREENING     PHQ-2 Total Score: 0 (3/9/2018  1:47 PM), No Data " Recorded,No Data Recorded    Health Maintenance   Topic Date Due     ADVANCE DIRECTIVES DISCUSSED WITH PATIENT  12/18/1958     DIABETES URINE MICROALBUMIN  02/17/2018     DIABETES HEMOGLOBIN A1C  03/05/2018     DIABETES FOLLOW-UP  03/05/2018     DIABETES FOOT EXAM  04/14/2018     DIABETES OPHTHALMOLOGY EXAM  10/31/2018     DXA SCAN  11/14/2018     FALL RISK ASSESSMENT  11/21/2018     FECAL OCCULT BLOOD/FIT ANNUAL COLON CANCER SCREENING  11/27/2018     TD 18+ HE  09/05/2027     PNEUMOCOCCAL POLYSACCHARIDE VACCINE AGE 65 AND OVER  Completed     INFLUENZA VACCINE RULE BASED  Completed     PNEUMOCOCCAL CONJUGATE VACCINE FOR ADULTS (PCV13 OR PREVNAR)  Completed     ZOSTER VACCINE  Completed     Total time was 25 minutes, greater than 50% counseling and coordinating care regarding the above issues.    Lolis Noland, CNP  Family Medicine, [facility], [place], operated by [facility]

## 2021-06-16 NOTE — PROGRESS NOTES
"ASSESSMENT & PLAN      Nav was seen today for follow-up.    Diagnoses and all orders for this visit:    CHF (congestive heart failure)    Ischemic cardiomyopathy    Coronary atherosclerosis  -     furosemide (LASIX) 20 MG tablet; TAKE 2 PILL (20 MG TOTAL) BY MOUTH DAILY / TXWILLIAM HNUB NOJ 1 LUB TSHUAJ PAB BON PHOB VOG    Other orders  -     carvedilol (COREG) 12.5 MG tablet; Take 2 tablets (25 mg total) by mouth 2 (two) times a day with meals.        No Follow-up on file.           CHIEF COMPLAINT: Nav Laurent had concerns including Follow-up.    [place]: 1.............. had concerns including Follow-up.    1. CHF (congestive heart failure)    2. Ischemic cardiomyopathy    3. Coronary atherosclerosis      No problem-specific Assessment & Plan notes found for this encounter.      CC:              History of shortness of breath    Last Friday presented with  SOB and fatigue     Since 11/2017  Outside out of breath was given a new prescription for carvedilol 25 mg twice a day and furosemide 40 mg in the morning and 20 mg in the afternoon  Doing better less swelling feels more energy    Lead counts were actually normal she is currently on iron supplement and her hemoglobin was 13 5    BNP was 4400     IN Home OK  Laying Flat?  Swelling ++     Weak from Pelvis to legs weakness from the legs is actually quite a bit better  No new cough  No no chest pain  \"Feeling better over the last couple of days\"    SUBJECTIVE:  Nav Laurent is a 77 y.o. female    Past Medical History:   Diagnosis Date     Alternating esotropia      Anemia      Anemia      Anxiety      Atherosclerosis      CHF (congestive heart failure)      COPD (chronic obstructive pulmonary disease)      Coronary artery disease     status post MI times two with BMS to LAD     Depression      Diabetes mellitus      Dysphagia      Dyspnea      Gall bladder disease      GERD (gastroesophageal reflux disease)      Hyperlipidemia      Hypertension      Ischemic cardiomyopathy "      Ischemic cardiomyopathy     EF 20-25% - follows with Dr. Lourdes Shepherd      Nonproliferative diabetic retinopathy      Occult blood positive stool 05/29/2014     Stenosis of larynx      Stroke      Vitamin D deficiency      Past Surgical History:   Procedure Laterality Date     CARDIAC CATHETERIZATION       ESOPHAGOGASTRODUODENOSCOPY N/A 2/7/2017    Procedure: ESOPHAGOGASTRODUODENOSCOPY ;  Surgeon: Mitchell Mcbride MD;  Location: [facility];  Service:      TRACHEOSTOMY       Review of patient's allergies indicates no known allergies.  Current Outpatient Prescriptions   Medication Sig Dispense Refill     acetaminophen 500 mg coapsule Take 2 capsules by mouth twice daily as needed for back pain (Patient taking differently: Take 1,000 mg by mouth 2 (two) times a day as needed for pain (back pain). ) 120 capsule 0     acetic acid-hydrocortisone (VOSOL-HC) otic solution 3 drops each ear up to four times daily 10 mL 3     albuterol (PROVENTIL) 2.5 mg /3 mL (0.083 %) nebulizer solution Take 3 mL (2.5 mg total) by nebulization every 6 (six) hours as needed for wheezing. 75 mL 12     alcohol swabs (ALCOHOL PREP PADS) PadM USE FOUR TIMES A DAY AS DIRECTED/ SIV 4 ZAUG IB HNUB LI TUS ELIZABETH MOB QHIA 200 each 10     aspirin 325 MG EC tablet Take 1 tablet (325 mg total) by mouth daily. 100 tablet 2     bisacodyl (DULCOLAX, BISACODYL,) 5 mg EC tablet Take 2 tablets (10 mg total) by mouth daily as needed for constipation. 30 tablet 3     blood pressure monitor Kit Check blood pressure daily 1 each 0     carboxymethylcellulose (REFRESH LIQUIGEL) 1 % ophthalmic solution 1 to 2 drops affected eye as needed for dryness (Patient taking differently: Apply 1-2 drops to eye as needed. 1 to 2 drops affected eye as needed for dryness) 30 mL 12     carvedilol (COREG) 12.5 MG tablet Take 2 tablets (25 mg total) by mouth 2 (two) times a day with meals. 180 tablet 1     ferrous sulfate 325 (65 FE) MG tablet TAKE 1 TABLET (325 MG  "TOTAL) BY MOUTH DAILY WITH BREAKFAST. 30 tablet 11     furosemide (LASIX) 20 MG tablet TAKE 2 PILL (20 MG TOTAL) BY MOUTH DAILY / TXHUA HNUB NOJ 1 LUB TSHUAJ PAB BON PHOB  tablet 0     gabapentin (NEURONTIN) 100 MG capsule Take 100 mg by mouth every morning.       gabapentin (NEURONTIN) 100 MG capsule TAKE 1 PILL BY MOUTH 3 TIMES EVERYDAY/ TXHUA HNUB NOJ 1 LUB TSHUAJ 3 ZAUG IB HNUB PAB BON MOB LEEG 90 capsule 10     INSULIN DEGLUDEC (TRESIBA FLEXTOUCH U-100 SUBQ) Inject 10 Units under the skin daily.       ipratropium-albuterol (COMBIVENT RESPIMAT)  mcg/actuation Mist inhaler Inhale 1 puff 4 (four) times a day as needed. 1 Inhaler 11     isosorbide mononitrate (IMDUR) 30 MG 24 hr tablet Take 1 tablet (30 mg total) by mouth daily. 30 tablet 11     ketotifen (ZADITOR/ZYRTEC ITCHY EYES) 0.025 % (0.035 %) ophthalmic solution 1 drop affected eye for itching and watering twice daily as needed 19 mL prn     losartan (COZAAR) 50 MG tablet Take 1 tablet by mouth daily.  5     methylcellulose (CITRUCEL) 500 mg Tab Take 1 tablet (500 mg total) by mouth daily. With a 8 oz glass of water 30 tablet 5     nitroglycerin (NITROSTAT) 0.4 MG SL tablet Place 1 tablet (0.4 mg total) under the tongue every 5 (five) minutes as needed for chest pain. 30 tablet 3     NOVOLOG FLEXPEN 100 unit/mL injection pen Inject 7 Units under the skin 3 (three) times a day before meals. 15 mL 4     nut.tx.gluc.intol,lac-free,soy (GLUCERNA SHAKE) Liqd Drink 1 bottle three times daily 90 Bottle 11     omeprazole (PRILOSEC) 20 MG capsule Take 1 capsule (20 mg total) by mouth daily before breakfast. 90 capsule 1     omeprazole (PRILOSEC) 20 MG capsule Take 1 capsule (20 mg total) by mouth 2 (two) times a day before meals. 180 capsule 2     ONETOUCH ULTRA TEST strips TEST BLOOD SUGARS FOUR TIMES DAILY -- BEFORE MEALS AND BEDTIME 200 strip 11     pen needle, diabetic (NOVOFINE 32) 32 gauge x 1/4\" Ndle 32 G x 6 MM - Used to inject Novolog and " Lantus insulin. Need 4 needles per day. E11.39 150 each 4     rosuvastatin (CRESTOR) 20 MG tablet Take 1 tablet (20 mg total) by mouth daily. 30 tablet 11     spironolactone (ALDACTONE) 25 MG tablet Take 1 tablet (25 mg total) by mouth daily. 90 tablet 3     No current facility-administered medications for this visit.      No family history on file.  Social History     Social History     Marital status:      Spouse name: N/A     Number of children: N/A     Years of education: N/A     Social History Main Topics     Smoking status: Never Smoker     Smokeless tobacco: Never Used     Alcohol use No     Drug use: No     Sexual activity: Not Currently     Other Topics Concern     None     Social History Narrative    Lives with family     Patient Active Problem List   Diagnosis     Atherosclerosis     Alternating Esotropia     Other emphysema     Murmurs     Vitamin D Deficiency     Mixed hyperlipidemia     Nonproliferative Retinopathy     Acute Myocardial Infarction     Coronary Artery Disease     Ischemic Cardiomyopathy     Ischemic Stroke  left side weakness arms/legs     Stenosis Of Larynx     Esophageal Reflux     Anemia     Hyperlipidemia     CKD (chronic kidney disease) stage 3, GFR 30-59 ml/min     ACS (acute coronary syndrome)     Iron deficiency anemia     Myofascial pain syndrome     Lumbago     Joint Pain, Localized in the Shoulder      Cervicalgia     Hypertension     Pain of upper abdomen     Type 2 diabetes mellitus with ophthalmic manifestations     Bruit of right carotid artery     Calculus of gallbladder without cholecystitis     Coronary artery disease involving native coronary artery of native heart without angina pectoris     LBBB (left bundle branch block)                                              SOCIAL: She  reports that she has never smoked. She has never used smokeless tobacco. She reports that she does not drink alcohol or use illicit drugs.    REVIEW OF SYSTEMS:   Family history not  pertinent to chief complaint or presenting problem    Review of systems otherwise negative as requested from patient, except   Those positive ROS outlined and discussed in [place].    OBJECTIVE:  /60 (Patient Site: Left Arm, Patient Position: Sitting, Cuff Size: Adult Regular)  Pulse 69  Temp 98.4  F (36.9  C)  Wt 107 lb (48.5 kg)  SpO2 94%  BMI 23.57 kg/m2    GENERAL:     No acute distress.   Alert and oriented X 3         Physical:    Walked from the lobby without a problem  No use of accessory muscles  Lungs are clear no crackles  Cardiac S1-S2 regular sinus positive S3  Abdomen soft with positive bowel sounds nontender  Trace edema along the sock line  His ectropion of the right bilaterally        ASSESSMENT & PLAN      Nav was seen today for follow-up.    Diagnoses and all orders for this visit:    CHF (congestive heart failure)    Ischemic cardiomyopathy    Coronary atherosclerosis  -     furosemide (LASIX) 20 MG tablet; TAKE 2 PILL (20 MG TOTAL) BY MOUTH DAILY / TXHUA HNUB NOJ 1 LUB TSHUAJ PAB BON PHOB VOG    Other orders  -     carvedilol (COREG) 12.5 MG tablet; Take 2 tablets (25 mg total) by mouth 2 (two) times a day with meals.        No Follow-up on file.       Son was not here but I will message left for adjustment of care treatment  Bee Please make the following      Change:     Carvedilol to 12.5   2 in AM and 2 in PM  Furosemide 20mg  2 in AM      Stop the Carvedilol 6.25      Do NOT ADD Isosorbide for now     Imdur or Isosorbide mononitrate 30 mg in AM        Recheck here 1 week to 11 days   Likely CHF exacerbation known history of a cardiomyopathy with ejection fraction that is quite low 10-20%  EKG reviewed from last visit no significant change patient has known left bundle branch block      Sinus rhythm with sinus ar... Normal sinus rhythm with s... Normal sinus rhythm with s...    Sinus rhythm with sinus arrhythmia   Possible Left atrial enlargement   Left bundle branch block   Abnormal  ECG   When compared with ECG of 08-FEB-2017 22:27,   No significant change was found   Confirmed by BHAVIK LEMUS MD LOC:[facility] ([id number]) on 2/22/2018 4:54:57 PM          Anticipatory Guidance and Symptomatic Cares Discussed   Advised to call back directly if there are further questions, or if these symptoms fail to improve as anticipated or worsen.  Return to clinic if patient has a clinical concern that warrants an exam.        25    Min Total Time, > 50% counseling and coordination of Care    Tomas Salazar MD  Family Medicine   [facility] [id number] [phone]

## 2021-06-17 NOTE — PROGRESS NOTES
"ASSESSMENT & PLAN      Nav was seen today for diabetes.    Diagnoses and all orders for this visit:    Type 2 diabetes mellitus with retinopathy, with long-term current use of insulin, macular edema presence unspecified, unspecified laterality, unspecified retinopathy severity    Ischemic cardiomyopathy    Iron deficiency anemia, unspecified iron deficiency anemia type    Diabetes mellitus type 2, uncontrolled  -     NOVOLOG FLEXPEN U-100 INSULIN 100 unit/mL injection pen; Inject 8 Units under the skin 3 (three) times a day before meals.    Type II diabetes mellitus with ophthalmic manifestations, uncontrolled  -     NOVOLOG FLEXPEN U-100 INSULIN 100 unit/mL injection pen; Inject 8 Units under the skin 3 (three) times a day before meals.    Type 2 diabetes mellitus with ophthalmic manifestations  -     NOVOLOG FLEXPEN U-100 INSULIN 100 unit/mL injection pen; Inject 8 Units under the skin 3 (three) times a day before meals.    Other orders  -     alcohol swabs (ALCOHOL PREP PADS) PadM; USE FOUR TIMES A DAY AS DIRECTED/ SIV 4 ZAUG IB HNUB LI TUS ELIZABETH MOB QHIA  -     pen needle, diabetic (NOVOFINE 32) 32 gauge x 1/4\" Ndle; 32 G x 6 MM - Used to inject Novolog and Lantus insulin. Need 4 needles per day. E11.39  -     insulin glargine (LANTUS SOLOSTAR U-100 INSULIN) 100 unit/mL (3 mL) pen; 12 units nightly adjust based on first AM sugar Do not mix Lantus with any other insulin  -     carvedilol (COREG) 25 MG tablet; Take 1 tablet (25 mg total) by mouth 2 (two) times a day with meals.  -     hydrocortisone 2.5 % cream; Apply omid daily to itchy back as needed      Return in about 3 months (around 7/23/2018).           CHIEF COMPLAINT: Nav Laurent had concerns including Diabetes.    [place]: 1.............. had concerns including Diabetes.    1. Type 2 diabetes mellitus with retinopathy, with long-term current use of insulin, macular edema presence unspecified, unspecified laterality, unspecified retinopathy severity    2. " "Ischemic cardiomyopathy    3. Iron deficiency anemia, unspecified iron deficiency anemia type    4. Diabetes mellitus type 2, uncontrolled    5. Type II diabetes mellitus with ophthalmic manifestations, uncontrolled    6. Type 2 diabetes mellitus with ophthalmic manifestations      Type 2 diabetes mellitus with ophthalmic manifestations  Diabetes is unchanged.   Continue current treatment regimen.  Reminded to bring in blood sugar diary at next visit.  Regular aerobic exercise.  Discussed foot care.  Diabetes will be reassessed in 6 months   Tresciba 10   Novolog 7   .    Ischemic cardiomyopathy  Walk  1/2 block   Feels short of breath after and weak nlegs   no chest pain      Iron deficiency anemia  No dizziness or light headed   \"better\"  No dark stools  Iron daily          CC:             Follow up             SUBJECTIVE:  Nav Laurent is a 77 y.o. female    Past Medical History:   Diagnosis Date     Alternating esotropia      Anemia      Anemia      Anxiety      Atherosclerosis      CHF (congestive heart failure)      COPD (chronic obstructive pulmonary disease)      Coronary artery disease     status post MI times two with BMS to LAD     Depression      Diabetes mellitus      Dysphagia      Dyspnea      Gall bladder disease      GERD (gastroesophageal reflux disease)      Hyperlipidemia      Hypertension      Ischemic cardiomyopathy      Ischemic cardiomyopathy     EF 20-25% - follows with Dr. Freed     Murmur      Nonproliferative diabetic retinopathy      Occult blood positive stool 05/29/2014     Stenosis of larynx      Stroke      Vitamin D deficiency      Past Surgical History:   Procedure Laterality Date     CARDIAC CATHETERIZATION       ESOPHAGOGASTRODUODENOSCOPY N/A 2/7/2017    Procedure: ESOPHAGOGASTRODUODENOSCOPY ;  Surgeon: Mitchell Mcbride MD;  Location: [facility];  Service:      TRACHEOSTOMY       Review of patient's allergies indicates no known allergies.  Current Outpatient Prescriptions "   Medication Sig Dispense Refill     acetic acid-hydrocortisone (VOSOL-HC) otic solution 3 drops each ear up to four times daily 10 mL 3     albuterol (PROVENTIL) 2.5 mg /3 mL (0.083 %) nebulizer solution Take 3 mL (2.5 mg total) by nebulization every 6 (six) hours as needed for wheezing. 75 mL 12     alcohol swabs (ALCOHOL PREP PADS) PadM USE FOUR TIMES A DAY AS DIRECTED/ SIV 4 ZAUG IB HNUB LI TUS ELIZABETH MOB QHIA 200 each 10     aspirin 325 MG EC tablet Take 1 tablet (325 mg total) by mouth daily. 100 tablet 2     bisacodyl (DULCOLAX, BISACODYL,) 5 mg EC tablet Take 2 tablets (10 mg total) by mouth daily as needed for constipation. 30 tablet 3     blood pressure monitor Kit Check blood pressure daily 1 each 0     carboxymethylcellulose (REFRESH LIQUIGEL) 1 % ophthalmic solution 1 to 2 drops affected eye as needed for dryness (Patient taking differently: Apply 1-2 drops to eye as needed. 1 to 2 drops affected eye as needed for dryness) 30 mL 12     carvedilol (COREG) 25 MG tablet Take 1 tablet (25 mg total) by mouth 2 (two) times a day with meals. 180 tablet 1     ferrous sulfate 325 (65 FE) MG tablet TAKE 1 TABLET (325 MG TOTAL) BY MOUTH DAILY WITH BREAKFAST. 30 tablet 11     furosemide (LASIX) 20 MG tablet TAKE 2 PILL (20 MG TOTAL) BY MOUTH DAILY / TXHUA HNUB NOJ 1 LUB TSHUAJ PAB BON PHOB  tablet 0     ipratropium-albuterol (COMBIVENT RESPIMAT)  mcg/actuation Mist inhaler Inhale 1 puff 4 (four) times a day as needed. 1 Inhaler 11     ketotifen (ZADITOR/ZYRTEC ITCHY EYES) 0.025 % (0.035 %) ophthalmic solution 1 drop affected eye for itching and watering twice daily as needed 19 mL prn     losartan (COZAAR) 50 MG tablet Take 1 tablet by mouth daily.  5     methylcellulose (CITRUCEL) 500 mg Tab Take 1 tablet (500 mg total) by mouth daily. With a 8 oz glass of water 30 tablet 5     NOVOLOG FLEXPEN U-100 INSULIN 100 unit/mL injection pen Inject 8 Units under the skin 3 (three) times a day before meals. 15 mL  "4     nut.tx.gluc.intol,lac-free,soy (GLUCERNA SHAKE) Liqd Drink 1 bottle three times daily 90 Bottle 11     omeprazole (PRILOSEC) 20 MG capsule Take 1 capsule (20 mg total) by mouth 2 (two) times a day before meals. 180 capsule 2     ONETOUCH ULTRA TEST strips TEST BLOOD SUGARS FOUR TIMES DAILY -- BEFORE MEALS AND BEDTIME 200 strip 11     pen needle, diabetic (NOVOFINE 32) 32 gauge x 1/4\" Ndle 32 G x 6 MM - Used to inject Novolog and Lantus insulin. Need 4 needles per day. E11.39 150 each 4     rosuvastatin (CRESTOR) 20 MG tablet Take 1 tablet (20 mg total) by mouth daily. 30 tablet 11     spironolactone (ALDACTONE) 25 MG tablet TAKE 1 PILL BY MOUTH EVERY DAY/TXHUA HNUB NOJ 1 LUB TSHUAJ 90 tablet 2     acetaminophen 500 mg coapsule Take 2 capsules by mouth twice daily as needed for back pain (Patient taking differently: Take 1,000 mg by mouth 2 (two) times a day as needed for pain (back pain). ) 120 capsule 0     gabapentin (NEURONTIN) 100 MG capsule Take 100 mg by mouth every morning.       gabapentin (NEURONTIN) 100 MG capsule TAKE 1 PILL BY MOUTH 3 TIMES EVERYDAY/ TXHUA HNUB NOJ 1 LUB TSHUAJ 3 ZAUG IB HNUB PAB BON MOB LEEG 90 capsule 10     hydrocortisone 2.5 % cream Apply omid daily to itchy back as needed 453 g 1     insulin glargine (LANTUS SOLOSTAR U-100 INSULIN) 100 unit/mL (3 mL) pen 12 units nightly adjust based on first AM sugar Do not mix Lantus with any other insulin 5 adj dose pen 3     isosorbide mononitrate (IMDUR) 30 MG 24 hr tablet Take 1 tablet (30 mg total) by mouth daily. 30 tablet 11     nitroglycerin (NITROSTAT) 0.4 MG SL tablet Place 1 tablet (0.4 mg total) under the tongue every 5 (five) minutes as needed for chest pain. 30 tablet 3     No current facility-administered medications for this visit.      No family history on file.  Social History     Social History     Marital status:      Spouse name: N/A     Number of children: N/A     Years of education: N/A     Social History Main " "Topics     Smoking status: Never Smoker     Smokeless tobacco: Never Used     Alcohol use No     Drug use: No     Sexual activity: Not Currently     Other Topics Concern     None     Social History Narrative    Lives with family     Patient Active Problem List   Diagnosis     Atherosclerosis     Alternating Esotropia     Other emphysema     Murmurs     Vitamin D Deficiency     Mixed hyperlipidemia     Nonproliferative Retinopathy     Acute Myocardial Infarction     Coronary Artery Disease     Ischemic cardiomyopathy     Ischemic Stroke  left side weakness arms/legs     Stenosis Of Larynx     Esophageal Reflux     Anemia     Hyperlipidemia     CKD (chronic kidney disease) stage 3, GFR 30-59 ml/min     ACS (acute coronary syndrome)     Iron deficiency anemia     Myofascial pain syndrome     Lumbago     Joint Pain, Localized in the Shoulder      Cervicalgia     Hypertension     Pain of upper abdomen     Type 2 diabetes mellitus with ophthalmic manifestations     Bruit of right carotid artery     Calculus of gallbladder without cholecystitis     Coronary artery disease involving native coronary artery of native heart without angina pectoris     LBBB (left bundle branch block)                                              SOCIAL: She  reports that she has never smoked. She has never used smokeless tobacco. She reports that she does not drink alcohol or use illicit drugs.    REVIEW OF SYSTEMS:   Family history not pertinent to chief complaint or presenting problem    Review of systems otherwise negative as requested from patient, except   Those positive ROS outlined and discussed in [place].    OBJECTIVE:  /74  Pulse 62  Ht 4' 8\" (1.422 m)  Wt 107 lb (48.5 kg)  SpO2 97%  BMI 23.99 kg/m2    GENERAL:     No acute distress.   Alert and oriented X 3         Physical:    His ectropion  No cervical or subclavicular nodes palpable  Old scarring in the midline of the sternal notch area consistent with an old " "tracheostomy  Lungs are clear without crackles  Cardiac S1-S2 positive S3  Regular rhythm  Abdomen soft  Lower extremities without edema  Normal monofilament testing  No calluses  Mild bruising of the feet  She has good Palmar erythema        ASSESSMENT & PLAN      Nav was seen today for diabetes.    Diagnoses and all orders for this visit:    Type 2 diabetes mellitus with retinopathy, with long-term current use of insulin, macular edema presence unspecified, unspecified laterality, unspecified retinopathy severity    Ischemic cardiomyopathy    Iron deficiency anemia, unspecified iron deficiency anemia type    Diabetes mellitus type 2, uncontrolled  -     NOVOLOG FLEXPEN U-100 INSULIN 100 unit/mL injection pen; Inject 8 Units under the skin 3 (three) times a day before meals.    Type II diabetes mellitus with ophthalmic manifestations, uncontrolled  -     NOVOLOG FLEXPEN U-100 INSULIN 100 unit/mL injection pen; Inject 8 Units under the skin 3 (three) times a day before meals.    Type 2 diabetes mellitus with ophthalmic manifestations  -     NOVOLOG FLEXPEN U-100 INSULIN 100 unit/mL injection pen; Inject 8 Units under the skin 3 (three) times a day before meals.    Other orders  -     alcohol swabs (ALCOHOL PREP PADS) PadM; USE FOUR TIMES A DAY AS DIRECTED/ SIV 4 ZAUG IB HNUB LI TUS ELIZABETH MOB QHIA  -     pen needle, diabetic (NOVOFINE 32) 32 gauge x 1/4\" Ndle; 32 G x 6 MM - Used to inject Novolog and Lantus insulin. Need 4 needles per day. E11.39  -     insulin glargine (LANTUS SOLOSTAR U-100 INSULIN) 100 unit/mL (3 mL) pen; 12 units nightly adjust based on first AM sugar Do not mix Lantus with any other insulin  -     carvedilol (COREG) 25 MG tablet; Take 1 tablet (25 mg total) by mouth 2 (two) times a day with meals.  -     hydrocortisone 2.5 % cream; Apply omid daily to itchy back as needed      Return in about 3 months (around 7/23/2018).       Anticipatory Guidance and Symptomatic Cares Discussed   Advised to " call back directly if there are further questions, or if these symptoms fail to improve as anticipated or worsen.  Return to clinic if patient has a clinical concern that warrants an exam.      I have had an Advance Directives discussion with the patient.        25   Min Total Time, > 50% counseling and coordination of Care    Tomas Salazar MD  Family Medicine   [facility] [id number] [phone]

## 2021-06-17 NOTE — PROGRESS NOTES
MTM Follow Up Encounter  ASSESSMENT AND PLAN  Type 2 Diabetes:  poorly controlled. A1C was not at goal of <7%. FBP not at goal  mg/dL. Recommended Lantus dose increase to 14 units. Also, discussed switching her Novolog to Tradjenta, which may put her at a lower risk of hypoglycemia and is ok to use with her renal function. In addition, with her poor eye sight, I am concerned that she is not giving herself the appropriate insulin doses.   PLAN:   1. Increase Lantus from 12 units to 14 units.   2.  d/c Novolog  3. Start Tradjenta 5 mg daily    CHF: Patient is asymptomatic today. Will have her continue carvedilol 12.5 mg BID for now but in the future, can consider increasing carvedilol to 25 mg BID which would be target dose.   Ideally, she would not taking furosemide before bed, but since she is not complaining of nocturia, will continue for now.   Will have her stay off isosorbide for now.     Anemia: Last Hgb levels are WNL. Will trial d/cing ferrous sulfate and rechecking Hgb with future labs.   PLAN:   1. D/C Ferrous sulfate  2. Future Hgb level    GERD: Ok to continue to use PRN.     CAD: Aspirin would be indicated for patient, but it is not clear why she is on 325 mg aspirin. Will defer to Dr. Salazar or cardiology for decreasing her dose to 81 mg. Will verify with [facility] that aspirin was refilled.   **per [facility], aspirin has not been picked up -- they will try calling Nav's son.   Future consideration = ask if 81 mg ok    MTM FOLLOW UP  1 month    SUBJECTIVE AND OBJECTIVE  Nav FATUMA Laurent is a 77 y.o. female here for a follow-up medication therapy management (MTM) appointment. A [employer]  joins us today.     Medication Adherence: She brought her medications with her today, but not the pill box that her son sets up.     Type 2 Diabetes: Currently taking Lantus 12 units daily and Novolog 7 units TID. Novolog pen that she brings in today  on 2018. She would rather use a pill than  "Novolog if possible.   Tests BG 2-3 times daily. Blood sugars per glucometer:   Fasting AM = 149, 182, 294,158, 304, 250, 204.  10-11 am = 184, 196, 154, 169  Noon-1pm = 290, 107, 96, 134  6-8pm = 180, 268  10pm-12am = 367, 288, 527 (ate cake), 416, 265, 347, 192   Last A1c checked 4/10/18 = 8.8%.   Microalbumin checked 4/10/18  Thinks that her BG are worse because her appetite has improved.     CHF: Current medications include losartan 50 mg NOON, spironolactone 25 mg HS, carvedilol 12.5 mg BID, and furosemide 20 mg AM and HS. Denies nocturia.  She does not have isosorbide with her today.   Denies lightheadedness/dizziness, SOB, or edema.   ECHO: Date 2/9/17, EF 19%  Wt Readings from Last 3 Encounters:   05/08/18 105 lb (47.6 kg)   04/25/18 103 lb (46.7 kg)   04/23/18 107 lb (48.5 kg)     Anemia: Currently taking ferrous sulfate daily -- reports that it helps with her \"blood flow.\" Last Hgb level = 13 on 4/23/18 and 13.5 on 2/22/18.     GERD: Brings a bottle of omeprazole 20 mg PRN with her today. Reports that she uses as needed. Denies stomach issues.     CAD: Does not have aspirin 325 mg with her today.          Nav's medication list was reviewed with them, discussing reason for use, directions for use, and potential side effects of each medication as needed. Indication, safety, efficacy, and convenience was assessed for all medications addressed above.  No environmental factors were noted currently affecting patient.  This care plan was communicated via EMR with her primary care provider, Tomas Salazar MD, who is the authorizing prescriber for this visit.  Direct supervision was available by either the patient's PCP or other available provider.    Time and complexity billing metrics are included in the docflowsheet linked to this visit    Time spent: 30 min  Jessica Kaplan, Pharm.D., BCACP   MTM Pharmacist at [facility]  "

## 2021-06-18 NOTE — PROGRESS NOTES
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   Continue current treatment regimen.  Diabetes will be reassessed in 3 months          Diabetes is unchanged.   Continue current treatment regimen.  Reminded to bring in blood sugar diary at next visit.  Regular aerobic exercise.  Discussed foot care.  Diabetes will be reassessed in 6 months   Lantus  14   Novolog 8 with Meals   Little higher with Prednisone  Continue Trajenta as well    No lows               .    Esophageal Reflux  Omeprazole 1 daily 20 mg      Anemia  No dizziness or light headed   \"better\"  No dark stools  Iron daily    Last hgb 11.7      Nav was seen today for follow-up.    Diagnoses and all orders for this visit:    Ischemic cardiomyopathy  -     isosorbide mononitrate (IMDUR) 30 MG 24 hr tablet; Take 1 tablet (30 mg total) by mouth daily.    Atherosclerosis    Type 2 diabetes mellitus with retinopathy, with long-term current use of insulin, macular edema presence unspecified, unspecified laterality, unspecified retinopathy severity    Age-related cataract of both eyes, unspecified age-related cataract type    Cough    Gastroesophageal reflux disease, esophagitis presence not specified    Anemia, unspecified type        No Follow-up on file.            CHIEF COMPLAINT: Nav Laurent had concerns including Follow-up.    [place]: 1.............. had concerns including Follow-up.    1. Ischemic cardiomyopathy    2. Atherosclerosis    3. Type 2 diabetes mellitus with retinopathy, with long-term current use of insulin, macular edema presence unspecified, unspecified laterality, unspecified retinopathy severity    4. Age-related cataract of both eyes, unspecified age-related cataract " type    5. Cough    6. Gastroesophageal reflux disease, esophagitis presence not specified    7. Anemia, unspecified type          CC:           She comes in the day was seen on 18 June she was prescribed prednisone we talked about its effects on sugars it did not have much of an effect she continues to use Lantus as well as NovoLog she stopped taking Tradjenta she thought it did not really make much help we also talked about back pain in her last visit cough which have been ongoing cough was questionable related to congestive heart failure versus chronic obstructive pulmonary disease exacerbation she states that about 75% better not troublesome  No shortness of breath or chest pain  No problems lying flat  No lower extremity edema      She wants to have a blood pressure cuff  Known history of ischemic cardiomyopathy and diabetes  Son sets of her medications  All medications reviewed multiple bottles that are dated in 2017 her older    Any other Problems in order of Priority:    View of systems otherwise negative    SUBJECTIVE:  Nav Laurent is a 77 y.o. female    Past Medical History:   Diagnosis Date     Alternating esotropia      Anemia      Anemia      Anxiety      Atherosclerosis      CHF (congestive heart failure) (H)      COPD (chronic obstructive pulmonary disease) (H)      Coronary artery disease     status post MI times two with BMS to LAD     Depression      Diabetes mellitus (H)      Dysphagia      Dyspnea      Gall bladder disease      GERD (gastroesophageal reflux disease)      Hyperlipidemia      Hypertension      Ischemic cardiomyopathy      Ischemic cardiomyopathy     EF 20-25% - follows with Dr. Freed     Murmur      Nonproliferative diabetic retinopathy (H)      Occult blood positive stool 05/29/2014     Stenosis of larynx      Stroke (H)      Vitamin D deficiency      Past Surgical History:   Procedure Laterality Date     CARDIAC CATHETERIZATION       ESOPHAGOGASTRODUODENOSCOPY N/A 2/7/2017     Procedure: ESOPHAGOGASTRODUODENOSCOPY ;  Surgeon: Mitchell Mcbride MD;  Location: [facility] [place];  Service:      TRACHEOSTOMY       Review of patient's allergies indicates no known allergies.  Current Outpatient Prescriptions   Medication Sig Dispense Refill     acetaminophen 500 mg coapsule Take 1,000 mg by mouth 2 (two) times a day as needed for pain (back pain). 100 capsule 1     albuterol (PROVENTIL) 2.5 mg /3 mL (0.083 %) nebulizer solution Take 3 mL (2.5 mg total) by nebulization every 6 (six) hours as needed for wheezing. 75 mL 12     alcohol swabs (ALCOHOL PREP PADS) PadM USE FOUR TIMES A DAY AS DIRECTED/ SIV 4 ZAUG IB HNUB LI TUS ELIZABETH MOB QHIA 200 each 10     aspirin 325 MG EC tablet Take 1 tablet (325 mg total) by mouth daily. 100 tablet 2     blood glucose test (ONETOUCH ULTRA TEST) strips TEST BLOOD SUGARS TWICE TIMES DAILY -- BEFORE MEALS 100 strip 11     blood pressure monitor Kit Check blood pressure daily 1 each 0     blood pressure test kit-wrist Kit Test blood sugars twice daily 100 each prn     carboxymethylcellulose (REFRESH LIQUIGEL) 1 % ophthalmic solution 1 to 2 drops affected eye as needed for dryness (Patient taking differently: Apply 1-2 drops to eye as needed. 1 to 2 drops affected eye as needed for dryness) 30 mL 12     carvedilol (COREG) 12.5 MG tablet Take 1 tablet (12.5 mg total) by mouth 2 (two) times a day with meals. 60 tablet 2     furosemide (LASIX) 20 MG tablet Take 1 tablet (20 mg total) by mouth 2 (two) times a day at 9am and 6pm. 180 tablet 0     hydrocortisone 2.5 % cream Apply omid daily to itchy back as needed 453 g 1     insulin glargine (LANTUS SOLOSTAR U-100 INSULIN) 100 unit/mL (3 mL) pen 14 units nightly adjust based on first AM sugar Do not mix Lantus with any other insulin 15 mL 3     ipratropium-albuterol (COMBIVENT RESPIMAT)  mcg/actuation Mist inhaler Inhale 1 puff 4 (four) times a day as needed. 1 Inhaler 11     isosorbide mononitrate (IMDUR) 30 MG 24  "hr tablet Take 1 tablet (30 mg total) by mouth daily. 30 tablet 11     ketotifen (ZADITOR/ZYRTEC ITCHY EYES) 0.025 % (0.035 %) ophthalmic solution 1 drop affected eye for itching and watering twice daily as needed 19 mL prn     linagliptin 5 mg Tab Take 1 tablet (5 mg total) by mouth once daily. With or without food 30 tablet 2     losartan (COZAAR) 50 MG tablet Take 1 tablet by mouth daily.  5     methylcellulose (CITRUCEL) 500 mg Tab Take 1 tablet (500 mg total) by mouth daily. With a 8 oz glass of water 30 tablet 5     nitroglycerin (NITROSTAT) 0.4 MG SL tablet Place 1 tablet (0.4 mg total) under the tongue every 5 (five) minutes as needed for chest pain. 30 tablet 3     nut.tx.gluc.intol,lac-free,soy (GLUCERNA SHAKE) Liqd Drink 1 bottle three times daily 90 Bottle 11     omeprazole (PRILOSEC) 20 MG capsule Take 20 mg by mouth daily as needed.       pen needle, diabetic (NOVOFINE 32) 32 gauge x 1/4\" Ndle 32 G x 6 MM - Used to inject Novolog and Lantus insulin. Need 4 needles per day. E11.39 150 each 4     rosuvastatin (CRESTOR) 20 MG tablet Take 1 tablet (20 mg total) by mouth daily. 30 tablet 11     spironolactone (ALDACTONE) 25 MG tablet TAKE 1 PILL BY MOUTH EVERY DAY/TXHUA HNUB NOJ 1 LUB TSHUAJ 90 tablet 2     triamcinolone (KENALOG) 0.1 % cream Apply up to  Twice daily to itchy skin not on face until resolved then use sparingly 80 g 1     No current facility-administered medications for this visit.      No family history on file.  Social History     Social History     Marital status:      Spouse name: N/A     Number of children: N/A     Years of education: N/A     Social History Main Topics     Smoking status: Never Smoker     Smokeless tobacco: Never Used     Alcohol use No     Drug use: No     Sexual activity: Not Currently     Other Topics Concern     Not on file     Social History Narrative    Lives with family     Patient Active Problem List   Diagnosis     Atherosclerosis     Alternating " "Esotropia     Other emphysema (H)     Murmurs     Vitamin D Deficiency     Mixed hyperlipidemia     Nonproliferative Retinopathy     Coronary Artery Disease     Ischemic cardiomyopathy     Ischemic Stroke  left side weakness arms/legs     Stenosis Of Larynx     Esophageal Reflux     Benign essential hypertension     Anemia     Hyperlipidemia     CKD (chronic kidney disease) stage 3, GFR 30-59 ml/min     Iron deficiency anemia     Myofascial pain syndrome     Lumbago     Joint Pain, Localized in the Shoulder      Cervicalgia     Hypertension     Pain of upper abdomen     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 She has never used smokeless tobacco. She reports that she does not drink alcohol or use illicit drugs.    REVIEW OF SYSTEMS:   Family history not pertinent to chief complaint or presenting problem    Review of systems otherwise negative as requested from patient, except   Those positive ROS outlined and discussed in [place].    OBJECTIVE:  /64 (Patient Site: Right Arm, Patient Position: Sitting, Cuff Size: Adult Regular)  Pulse 71  Ht 4' 8\" (1.422 m)  Wt 107 lb (48.5 kg)  SpO2 98%  BMI 23.99 kg/m2    GENERAL:     No acute distress.   Alert and oriented X 3         Physical:    No jugular venous distention  Lungs clear without crackles  Cardiac S1-S2 positive gallop no appreciable murmur regular rhythm  Abdomen soft nontender  Oropharynx clear without thrush  Bilateral cataracts  His ectropion on the right  No cervical or subclavicular nodes  Old tracheostomy scar        ASSESSMENT & PLAN      Nav was seen today for follow-up.    Diagnoses " and all orders for this visit:    Ischemic cardiomyopathy  -     isosorbide mononitrate (IMDUR) 30 MG 24 hr tablet; Take 1 tablet (30 mg total) by mouth daily.    Atherosclerosis    Type 2 diabetes mellitus with retinopathy, with long-term current use of insulin, macular edema presence unspecified, unspecified laterality, unspecified retinopathy severity    Age-related cataract of both eyes, unspecified age-related cataract type    Cough    Gastroesophageal reflux disease, esophagitis presence not specified    Anemia, unspecified type    We talked about cough and its relationship possibly congestive heart failure versus COPD patient improving I reordered her Imdur as this was not in the patient's medication box today she will continue her Maame, carvedilol, furosemide, spironolactone  She should have a recheck of her kidney function tests in 1 month time      Cataracts causing blurry vision I have asked her to see ophthalmology for possible cataract extraction    Diabetes fairly well controlled no hypoglycemic episodes currently on Lantus 14  no lobar NovoLog 8 to continue Tradjenta 5 we talked about this relationship of help controlling mealtime sugars    Rx will be written for Imdur 30 mg talked about the possibility of lightheadedness      No Follow-up on file.       Anticipatory Guidance and Symptomatic Cares Discussed   Advised to call back directly if there are further questions, or if these symptoms fail to improve as anticipated or worsen.  Return to clinic if patient has a clinical concern that warrants an exam.        25  Min Total Time, > 50% counseling and coordination of Care regarding diabetes, hypertension, congestive heart failure, as well as ischemic cardiovascular disease    Tomas Salazar MD  Family Medicine   [facility] [id number] [phone]

## 2021-06-18 NOTE — PROGRESS NOTES
"ASSESSMENT & PLAN    No problem-specific Assessment & Plan notes found for this encounter.      Nav was seen today for cough.    Diagnoses and all orders for this visit:    Ischemic cardiomyopathy  -     isosorbide mononitrate (IMDUR) 30 MG 24 hr tablet; Take 1 tablet (30 mg total) by mouth daily.    Type 2 diabetes mellitus with retinopathy, with long-term current use of insulin, macular edema presence unspecified, unspecified laterality, unspecified retinopathy severity    Type 2 diabetes mellitus with other specified complication (H)  -     blood glucose test (ONETOUCH ULTRA TEST) strips; TEST BLOOD SUGARS TWICE TIMES DAILY -- BEFORE MEALS    Atherosclerosis of coronary artery of native heart with angina pectoris, unspecified vessel or lesion type (H)    Other orders  -     blood pressure test kit-wrist Kit; Test blood sugars twice daily      Return in about 1 week (around 6/25/2018).            CHIEF COMPLAINT: Nav Laurent had concerns including Cough.    [place]: 1.............. had concerns including Cough.    1. Ischemic cardiomyopathy    2. Type 2 diabetes mellitus with retinopathy, with long-term current use of insulin, macular edema presence unspecified, unspecified laterality, unspecified retinopathy severity    3. Type 2 diabetes mellitus with other specified complication (H)    4. Atherosclerosis of coronary artery of native heart with angina pectoris, unspecified vessel or lesion type (H)          CC:             Cough persists  Received Prednisone  Did not take    Spironolactone  Losartan  Furosemide    Tradjenta    Crestor    Omeprazole      Wants something for \"low Back\"  Back pain  Down right leg > left leg    What's it like in one word?:                                              Pain Daily  How long is it ongoing: days, weeks,months?:                1 year +  What makes it worse: activity? Eating? Movement? :      Walking  What makes it better?:                                                     " Meds  Laying down  0/10-10/10:Pain/Intesity                                                    7      Any associated Sx to above complaint:   Numbness Yes ,  By tailbone,  Bowel or Bladder issue NO    Any other Problems in order of Priority:        SUBJECTIVE:  Nav Laurent is a 77 y.o. female    Past Medical History:   Diagnosis Date     Alternating esotropia      Anemia      Anemia      Anxiety      Atherosclerosis      CHF (congestive heart failure) (H)      COPD (chronic obstructive pulmonary disease) (H)      Coronary artery disease     status post MI times two with BMS to LAD     Depression      Diabetes mellitus (H)      Dysphagia      Dyspnea      Gall bladder disease      GERD (gastroesophageal reflux disease)      Hyperlipidemia      Hypertension      Ischemic cardiomyopathy      Ischemic cardiomyopathy     EF 20-25% - follows with Dr. Freed     Murmur      Nonproliferative diabetic retinopathy (H)      Occult blood positive stool 05/29/2014     Stenosis of larynx      Stroke (H)      Vitamin D deficiency      Past Surgical History:   Procedure Laterality Date     CARDIAC CATHETERIZATION       ESOPHAGOGASTRODUODENOSCOPY N/A 2/7/2017    Procedure: ESOPHAGOGASTRODUODENOSCOPY ;  Surgeon: Mitchell Mcbride MD;  Location: [facility];  Service:      TRACHEOSTOMY       Review of patient's allergies indicates no known allergies.  Current Outpatient Prescriptions   Medication Sig Dispense Refill     acetaminophen 500 mg coapsule Take 2 capsules by mouth twice daily as needed for back pain (Patient taking differently: Take 1,000 mg by mouth 2 (two) times a day as needed for pain (back pain). ) 120 capsule 0     albuterol (PROVENTIL) 2.5 mg /3 mL (0.083 %) nebulizer solution Take 3 mL (2.5 mg total) by nebulization every 6 (six) hours as needed for wheezing. 75 mL 12     alcohol swabs (ALCOHOL PREP PADS) PadM USE FOUR TIMES A DAY AS DIRECTED/ SIV 4 ZAUG IB HNUB LI TUS ELIZABETH MOB QHIA 200 each 10     aspirin 325 MG  EC tablet Take 1 tablet (325 mg total) by mouth daily. 100 tablet 2     blood glucose test (ONETOUCH ULTRA TEST) strips TEST BLOOD SUGARS TWICE TIMES DAILY -- BEFORE MEALS 100 strip 11     blood pressure monitor Kit Check blood pressure daily 1 each 0     carboxymethylcellulose (REFRESH LIQUIGEL) 1 % ophthalmic solution 1 to 2 drops affected eye as needed for dryness (Patient taking differently: Apply 1-2 drops to eye as needed. 1 to 2 drops affected eye as needed for dryness) 30 mL 12     carvedilol (COREG) 12.5 MG tablet Take 1 tablet (12.5 mg total) by mouth 2 (two) times a day with meals. 60 tablet 2     furosemide (LASIX) 20 MG tablet Take 1 tablet (20 mg total) by mouth 2 (two) times a day at 9am and 6pm. 180 tablet 0     hydrocortisone 2.5 % cream Apply omid daily to itchy back as needed 453 g 1     insulin glargine (LANTUS SOLOSTAR U-100 INSULIN) 100 unit/mL (3 mL) pen 14 units nightly adjust based on first AM sugar Do not mix Lantus with any other insulin 15 mL 3     ipratropium-albuterol (COMBIVENT RESPIMAT)  mcg/actuation Mist inhaler Inhale 1 puff 4 (four) times a day as needed. 1 Inhaler 11     ketotifen (ZADITOR/ZYRTEC ITCHY EYES) 0.025 % (0.035 %) ophthalmic solution 1 drop affected eye for itching and watering twice daily as needed 19 mL prn     linagliptin 5 mg Tab Take 1 tablet (5 mg total) by mouth once daily. With or without food 30 tablet 2     losartan (COZAAR) 50 MG tablet Take 1 tablet by mouth daily.  5     methylcellulose (CITRUCEL) 500 mg Tab Take 1 tablet (500 mg total) by mouth daily. With a 8 oz glass of water 30 tablet 5     nitroglycerin (NITROSTAT) 0.4 MG SL tablet Place 1 tablet (0.4 mg total) under the tongue every 5 (five) minutes as needed for chest pain. 30 tablet 3     nut.tx.gluc.intol,lac-free,soy (GLUCERNA SHAKE) Liqd Drink 1 bottle three times daily 90 Bottle 11     omeprazole (PRILOSEC) 20 MG capsule Take 20 mg by mouth daily as needed.       pen needle, diabetic  "(NOVOFINE 32) 32 gauge x 1/4\" Ndle 32 G x 6 MM - Used to inject Novolog and Lantus insulin. Need 4 needles per day. E11.39 150 each 4     rosuvastatin (CRESTOR) 20 MG tablet Take 1 tablet (20 mg total) by mouth daily. 30 tablet 11     spironolactone (ALDACTONE) 25 MG tablet TAKE 1 PILL BY MOUTH EVERY DAY/TXHUA HNUB NOJ 1 LUB TSHUAJ 90 tablet 2     triamcinolone (KENALOG) 0.1 % cream Apply up to  Twice daily to itchy skin not on face until resolved then use sparingly 80 g 1     blood pressure test kit-wrist Kit Test blood sugars twice daily 100 each prn     isosorbide mononitrate (IMDUR) 30 MG 24 hr tablet Take 1 tablet (30 mg total) by mouth daily. 30 tablet 11     No current facility-administered medications for this visit.      No family history on file.  Social History     Social History     Marital status:      Spouse name: N/A     Number of children: N/A     Years of education: N/A     Social History Main Topics     Smoking status: Never Smoker     Smokeless tobacco: Never Used     Alcohol use No     Drug use: No     Sexual activity: Not Currently     Other Topics Concern     Not on file     Social History Narrative    Lives with family     Patient Active Problem List   Diagnosis     Atherosclerosis     Alternating Esotropia     Other emphysema (H)     Murmurs     Vitamin D Deficiency     Mixed hyperlipidemia     Nonproliferative Retinopathy     Coronary Artery Disease     Ischemic cardiomyopathy     Ischemic Stroke  left side weakness arms/legs     Stenosis Of Larynx     Esophageal Reflux     Anemia     Hyperlipidemia     CKD (chronic kidney disease) stage 3, GFR 30-59 ml/min     Iron deficiency anemia     Myofascial pain syndrome     Lumbago     Joint Pain, Localized in the Shoulder      Cervicalgia     Hypertension     Pain of upper abdomen     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 She has never used smokeless tobacco. She reports that she does not drink alcohol or use illicit drugs.    REVIEW OF SYSTEMS:   Family history not pertinent to chief complaint or presenting problem    Review of systems otherwise negative as requested from patient, except   Those positive ROS outlined and discussed in [place].    OBJECTIVE:  /74 (Patient Site: Right Arm, Patient Position: Sitting, Cuff Size: Adult Regular)  Pulse 80  Temp 98.2  F (36.8  C) (Oral)   Ht 4' 8\" (1.422 m)  Wt 105 lb (47.6 kg)  SpO2 96%  BMI 23.54 kg/m2    GENERAL:     No acute distress.   Alert and oriented X 3         Physical:    Pale  Oral Clear no thrush  No bruit  No JvP  Regular  + Gallop  No Crackles No wheeze with Regulalr breath  Trace Edema  stregnth 5/5 lower flex ext ankle and knee  Straight leg raise Equivocal    No results found for this or any previous visit (from the past 240 hour(s)).      ASSESSMENT & PLAN      Nav was seen today for cough.    Diagnoses and all orders for this visit:    Ischemic cardiomyopathy  -     isosorbide mononitrate (IMDUR) 30 MG 24 hr tablet; Take 1 tablet (30 mg total) by mouth daily.    Type 2 diabetes mellitus with retinopathy, with long-term current use of insulin, macular edema presence unspecified, unspecified laterality, unspecified retinopathy severity    Type 2 diabetes mellitus with other specified complication (H)  -     blood glucose test (ONETOUCH ULTRA TEST) strips; TEST BLOOD SUGARS TWICE TIMES DAILY -- BEFORE MEALS    Atherosclerosis of coronary artery of native heart with angina pectoris, unspecified vessel or lesion type (H)    Other orders  -     blood pressure test kit-wrist Kit; Test blood sugars twice daily      Return in about 1 week (around " 6/25/2018).       Add Imdur  PT  ? Angina Component   No evidence of failure today suspect gallop is Due to Low EF        Anticipatory Guidance and Symptomatic Cares Discussed   Advised to call back directly if there are further questions, or if these symptoms fail to improve as anticipated or worsen.  Return to clinic if patient has a clinical concern that warrants an exam.         25 Min Total Time, > 50% counseling and coordination of Care    Tomas Salazar MD  Family Medicine   [place] [phone]

## 2021-06-18 NOTE — PROGRESS NOTES
[facility]  OFFICE VISIT - FAMILY MEDICINE     ASSESSMENT AND PLAN     1. Cough  Suspect COPD exacerbation. Appears euvolemic in clinic today. Lungs with wheezing bilaterally. Labs today. Continue inhalers as prescribed. Take prednisone 2 tablets with breakfast for 5 days. Pt to return for follow up in 1 week. Call clinic if any change in condition.     HM1(CBC and Differential)    Comprehensive Metabolic Panel    BNP(B-type Natriuretic Peptide)    Magnesium    predniSONE (DELTASONE) 20 MG tablet    HM1 (CBC with Diff)   2. COPD (chronic obstructive pulmonary disease) (H)         CHIEF COMPLAINT     Cough (cough for 2 weeks associates with SOB, wheezing)    HPI     Nav BURRIS Anastasiia is a 77 y.o. female with past medical history as below who presents today for evaluation of cough, wheezing. Reports symptoms x 2 weeks with wheezing and mild shortness of breath. Taking inhalers as prescribed and using albuterol more frequently. Symptoms are not worse with laying flat. Denies changes in weight or LE swelling. Reports symptoms started initially with runny nose, and what she thought to be allergy symptoms. No CP. No fevers, chills, nausea, vomiting or diarrhea.        Review of Systems  12-point review of systems completed and as per HPI, otherwise negative.     PMSH     Past Medical History:   Diagnosis Date     Alternating esotropia      Anemia      Anemia      Anxiety      Atherosclerosis      CHF (congestive heart failure)      COPD (chronic obstructive pulmonary disease)      Coronary artery disease     status post MI times two with BMS to LAD     Depression      Diabetes mellitus      Dysphagia      Dyspnea      Gall bladder disease      GERD (gastroesophageal reflux disease)      Hyperlipidemia      Hypertension      Ischemic cardiomyopathy      Ischemic cardiomyopathy     EF 20-25% - follows with Dr. Freed     Murmur      Nonproliferative diabetic retinopathy      Occult blood positive stool 05/29/2014      Stenosis of larynx      Stroke      Vitamin D deficiency      Past Surgical History:   Procedure Laterality Date     CARDIAC CATHETERIZATION       ESOPHAGOGASTRODUODENOSCOPY N/A 2/7/2017    Procedure: ESOPHAGOGASTRODUODENOSCOPY ;  Surgeon: Mitchell Mcbride MD;  Location: [facility] [place];  Service:      TRACHEOSTOMY         PFSH   No family history on file.  Social History     Social History     Marital status:      Spouse name: N/A     Number of children: N/A     Years of education: N/A     Occupational History     Not on file.     Social History Main Topics     Smoking status: Never Smoker     Smokeless tobacco: Never Used     Alcohol use No     Drug use: No     Sexual activity: Not Currently     Other Topics Concern     Not on file     Social History Narrative    Lives with family     Relevant history was reviewed with the patient today, unless noted in HPI, is not pertinent for this visit.    MEDICATIONS     Current Outpatient Prescriptions on File Prior to Visit   Medication Sig Dispense Refill     acetaminophen 500 mg coapsule Take 2 capsules by mouth twice daily as needed for back pain (Patient taking differently: Take 1,000 mg by mouth 2 (two) times a day as needed for pain (back pain). ) 120 capsule 0     acetic acid-hydrocortisone (VOSOL-HC) otic solution 3 drops each ear up to four times daily 10 mL 3     albuterol (PROVENTIL) 2.5 mg /3 mL (0.083 %) nebulizer solution Take 3 mL (2.5 mg total) by nebulization every 6 (six) hours as needed for wheezing. 75 mL 12     alcohol swabs (ALCOHOL PREP PADS) PadM USE FOUR TIMES A DAY AS DIRECTED/ SIV 4 ZAUG IB HNUB LI TUS ELIZABETH MOB QHIA 200 each 10     aspirin 325 MG EC tablet Take 1 tablet (325 mg total) by mouth daily. 100 tablet 2     bisacodyl (DULCOLAX, BISACODYL,) 5 mg EC tablet Take 2 tablets (10 mg total) by mouth daily as needed for constipation. 30 tablet 3     blood pressure monitor Kit Check blood pressure daily 1 each 0      carboxymethylcellulose (REFRESH LIQUIGEL) 1 % ophthalmic solution 1 to 2 drops affected eye as needed for dryness (Patient taking differently: Apply 1-2 drops to eye as needed. 1 to 2 drops affected eye as needed for dryness) 30 mL 12     carvedilol (COREG) 12.5 MG tablet Take 1 tablet (12.5 mg total) by mouth 2 (two) times a day with meals. 60 tablet 2     furosemide (LASIX) 20 MG tablet Take 1 tablet (20 mg total) by mouth 2 (two) times a day at 9am and 6pm. 180 tablet 0     furosemide (LASIX) 20 MG tablet TAKE 2 PILLS (40 MG TOTAL) BY MOUTH DAILY / TXHUA HNUB NOJ 2 LUB TSHUAJ PAB BON PHOB  tablet 3     hydrocortisone 2.5 % cream Apply omid daily to itchy back as needed 453 g 1     insulin glargine (LANTUS SOLOSTAR U-100 INSULIN) 100 unit/mL (3 mL) pen 14 units nightly adjust based on first AM sugar Do not mix Lantus with any other insulin 15 mL 3     ipratropium-albuterol (COMBIVENT RESPIMAT)  mcg/actuation Mist inhaler Inhale 1 puff 4 (four) times a day as needed. 1 Inhaler 11     ketotifen (ZADITOR/ZYRTEC ITCHY EYES) 0.025 % (0.035 %) ophthalmic solution 1 drop affected eye for itching and watering twice daily as needed 19 mL prn     linagliptin 5 mg Tab Take 1 tablet (5 mg total) by mouth once daily. With or without food 30 tablet 2     losartan (COZAAR) 50 MG tablet Take 1 tablet by mouth daily.  5     methylcellulose (CITRUCEL) 500 mg Tab Take 1 tablet (500 mg total) by mouth daily. With a 8 oz glass of water 30 tablet 5     nitroglycerin (NITROSTAT) 0.4 MG SL tablet Place 1 tablet (0.4 mg total) under the tongue every 5 (five) minutes as needed for chest pain. 30 tablet 3     nut.tx.gluc.intol,lac-free,soy (GLUCERNA SHAKE) Liqd Drink 1 bottle three times daily 90 Bottle 11     omeprazole (PRILOSEC) 20 MG capsule Take 20 mg by mouth daily as needed.       ONETOUCH ULTRA TEST strips TEST BLOOD SUGARS FOUR TIMES DAILY -- BEFORE MEALS AND BEDTIME 200 strip 11     pen needle, diabetic (NOVOFINE 32)  "32 gauge x 1/4\" Ndle 32 G x 6 MM - Used to inject Novolog and Lantus insulin. Need 4 needles per day. E11.39 150 each 4     rosuvastatin (CRESTOR) 20 MG tablet Take 1 tablet (20 mg total) by mouth daily. 30 tablet 11     spironolactone (ALDACTONE) 25 MG tablet TAKE 1 PILL BY MOUTH EVERY DAY/TXHUA HNUB NOJ 1 LUB TSHUAJ 90 tablet 2     triamcinolone (KENALOG) 0.1 % cream Apply up to  Twice daily to itchy skin not on face until resolved then use sparingly 80 g 1     No current facility-administered medications on file prior to visit.        OBJECTIVE   /58 (Patient Site: Left Arm, Patient Position: Sitting, Cuff Size: Adult Regular)  Pulse 68  Ht 4' 8\" (1.422 m)  Wt 106 lb (48.1 kg)  BMI 23.76 kg/m2    Wt Readings from Last 3 Encounters:   06/08/18 106 lb (48.1 kg)   05/08/18 105 lb (47.6 kg)   04/25/18 103 lb (46.7 kg)     Physical Exam  Physical Examination: General appearance - alert, well appearing, and in no distress  Mental status - alert, oriented to person, place, and time  Chest - clear to auscultation, expiratory wheezing bilaterally, rales or rhonchi, symmetric air entry  Heart - normal rate, regular rhythm, normal S1, S2, no murmurs, rubs, clicks or gallops, no JVD, no LE swelling  Neurological - alert, oriented, normal speech, no focal findings or movement disorder noted  Musculoskeletal - no joint tenderness, deformity or swelling  Extremities - peripheral pulses normal, no pedal edema, no clubbing or cyanosis  Skin - normal coloration and turgor, no rashes, no suspicious skin lesions noted      RESULTS/CONSULTS (Lab/Radiology)   No results found for this or any previous visit (from the past 168 hour(s)).    HEALTH MAINTENANCE / SCREENING     PHQ-2 Total Score: 0 (3/9/2018  1:47 PM), No Data Recorded,No Data Recorded    Health Maintenance   Topic Date Due     DIABETES HEMOGLOBIN A1C  10/10/2018     DIABETES FOLLOW-UP  10/23/2018     DIABETES OPHTHALMOLOGY EXAM  10/31/2018     DXA SCAN  " 11/14/2018     FALL RISK ASSESSMENT  11/21/2018     FECAL OCCULT BLOOD/FIT ANNUAL COLON CANCER SCREENING  11/27/2018     DIABETES URINE MICROALBUMIN  04/10/2019     DIABETES FOOT EXAM  04/23/2019     ADVANCE DIRECTIVES DISCUSSED WITH PATIENT  04/23/2023     TD 18+ HE  09/05/2027     PNEUMOCOCCAL POLYSACCHARIDE VACCINE AGE 65 AND OVER  Completed     INFLUENZA VACCINE RULE BASED  Completed     PNEUMOCOCCAL CONJUGATE VACCINE FOR ADULTS (PCV13 OR PREVNAR)  Completed     ZOSTER VACCINE  Completed     Total time was 25 minutes, greater than 50% counseling and coordinating care regarding the above issues.    Lolis Noland, CNP  Family Medicine, [facility]

## 2021-06-18 NOTE — PROGRESS NOTES
MTM Follow Up Encounter  Assessment & Plan                                                       Type 2 Diabetes:  poorly controlled. A1C was not at goal of <7%. FBG overall not at goal  mg/dL. Reviewed recommendation from last MTM appt of switching her Novolog to Tradjenta -- reviewed that this would put her at a lower risk of hypoglycemia and is ok to use with her renal function. In addition, with her poor eyesight, I am concerned that she is not giving herself the correct doses. She was agreeable to change today -- d/c Novolog and start Tradjenta. I called her pharmacy and refilled the Tradjenta.   PLAN:   1. D/C Novolog.   2. Start Tradjenta 5 mg daily.     CHF: Will have patient continue current regimen. Patient was on 12.5 mg carvedilol in the past, but she appears to be taking 25 mg now. Carvedilol 25 mg two times a day is target dose so will have her continue and monitor for hypotension. In addition, she has been on furosemide 20 mg two times a day in the past, but she is now taking 20 mg once daily -- will have her continue and monitor weight, which has not significantly changed since a month ago.   Will have her stay off isosorbide for now.     Medication Adherence/Access: Encouraged patient to bring pill box to follow up.     Follow Up  1 month    Subjective & Objective                                                       Nav Laurent is a 77 y.o. female coming in for a follow up visit for Medication Therapy Management. She was referred to me from Tomas Salazar MD. Also seeing Lolis Noland CNP today. A alejandro  joins us today.     Type 2 Diabetes: Currently taking Lantus 14 units daily and Novolog 7 units TID. At last MTM appt, Novolog was d/c'ed and Tradjenta started -- patient did not make change because she does not think pills work as well. She did not have Tradjenta with her today.   Tests BG 2-3 times daily. Blood sugars per glucometer:   Fasting AM = 191, 432, 109.  ~10am =  217, 148  ~12pm = 178, 344, 253, 230, 281  ~3pm = 70  ~5pm = 95  10pm-12am = 339, 267, 470, 442, 161, 377, 300, 288, 240, 338  Last A1c checked 4/10/18 = 8.8%.   Microalbumin checked 4/10/18    CHF: Current medications include losartan 50 mg NOON, spironolactone 25 mg HS, carvedilol 25 mg BID, and furosemide 20 mg AM.   In the past she has reported being on furosemide 20 mg two times a day, but today she reports being on it once daily AM.   Recently she has been on 12.5 mg carvedilol, but today she brings in the 25 mg tablets.   She does not have isosorbide with her today.   Denies lightheadedness/dizziness, SOB, or edema.   ECHO: Date 2/9/17, EF 19%    Medication Adherence/Access: Patient brought her medications with her today, but not her pill box which her son sets up.     PMH: reviewed in EPIC   Allergies/ADRs: reviewed in EPIC   Alcohol: reviewed in EPIC   Tobacco:   History   Smoking Status     Never Smoker   Smokeless Tobacco     Never Used     Today's Vitals: There were no vitals filed for this visit.  ----------------    Much or all of the text in this note was generated through the use of Dragon Dictate voice-to-text software. Errors in spelling or words which seem out of context are unintentional. Sound alike errors, in particular, may have escaped editing.    The patient was not given an AVS due to language barrier    I spent 30 minutes with this patient today; All changes were made via collaborative practice agreement with Tomas Salazar MD. A copy of the visit note was provided to the patient's provider.     Jessica Kaplan, Pharm.D., BCACP  Medication Therapy Management Pharmacist  [facility]         Current Outpatient Prescriptions   Medication Sig Dispense Refill     acetaminophen 500 mg coapsule Take 2 capsules by mouth twice daily as needed for back pain (Patient taking differently: Take 1,000 mg by mouth 2 (two) times a day as needed for pain (back pain). ) 120 capsule 0      acetic acid-hydrocortisone (VOSOL-HC) otic solution 3 drops each ear up to four times daily 10 mL 3     albuterol (PROVENTIL) 2.5 mg /3 mL (0.083 %) nebulizer solution Take 3 mL (2.5 mg total) by nebulization every 6 (six) hours as needed for wheezing. 75 mL 12     alcohol swabs (ALCOHOL PREP PADS) PadM USE FOUR TIMES A DAY AS DIRECTED/ SIV 4 ZAUG IB HNUB LI TUS ELIZABETH MOB QHIA 200 each 10     aspirin 325 MG EC tablet Take 1 tablet (325 mg total) by mouth daily. 100 tablet 2     bisacodyl (DULCOLAX, BISACODYL,) 5 mg EC tablet Take 2 tablets (10 mg total) by mouth daily as needed for constipation. 30 tablet 3     blood pressure monitor Kit Check blood pressure daily 1 each 0     carboxymethylcellulose (REFRESH LIQUIGEL) 1 % ophthalmic solution 1 to 2 drops affected eye as needed for dryness (Patient taking differently: Apply 1-2 drops to eye as needed. 1 to 2 drops affected eye as needed for dryness) 30 mL 12     carvedilol (COREG) 12.5 MG tablet Take 1 tablet (12.5 mg total) by mouth 2 (two) times a day with meals. 60 tablet 2     furosemide (LASIX) 20 MG tablet Take 1 tablet (20 mg total) by mouth 2 (two) times a day at 9am and 6pm. 180 tablet 0     furosemide (LASIX) 20 MG tablet TAKE 2 PILLS (40 MG TOTAL) BY MOUTH DAILY / TXHUA HNUB NOJ 2 LUB TSHUAJ PAB BON PHOB  tablet 3     hydrocortisone 2.5 % cream Apply omid daily to itchy back as needed 453 g 1     insulin glargine (LANTUS SOLOSTAR U-100 INSULIN) 100 unit/mL (3 mL) pen 14 units nightly adjust based on first AM sugar Do not mix Lantus with any other insulin 15 mL 3     ipratropium-albuterol (COMBIVENT RESPIMAT)  mcg/actuation Mist inhaler Inhale 1 puff 4 (four) times a day as needed. 1 Inhaler 11     ketotifen (ZADITOR/ZYRTEC ITCHY EYES) 0.025 % (0.035 %) ophthalmic solution 1 drop affected eye for itching and watering twice daily as needed 19 mL prn     linagliptin 5 mg Tab Take 1 tablet (5 mg total) by mouth once daily. With or without food 30  "tablet 2     losartan (COZAAR) 50 MG tablet Take 1 tablet by mouth daily.  5     methylcellulose (CITRUCEL) 500 mg Tab Take 1 tablet (500 mg total) by mouth daily. With a 8 oz glass of water 30 tablet 5     nitroglycerin (NITROSTAT) 0.4 MG SL tablet Place 1 tablet (0.4 mg total) under the tongue every 5 (five) minutes as needed for chest pain. 30 tablet 3     nut.tx.gluc.intol,lac-free,soy (GLUCERNA SHAKE) Liqd Drink 1 bottle three times daily 90 Bottle 11     omeprazole (PRILOSEC) 20 MG capsule Take 20 mg by mouth daily as needed.       ONETOUCH ULTRA TEST strips TEST BLOOD SUGARS FOUR TIMES DAILY -- BEFORE MEALS AND BEDTIME 200 strip 11     pen needle, diabetic (NOVOFINE 32) 32 gauge x 1/4\" Ndle 32 G x 6 MM - Used to inject Novolog and Lantus insulin. Need 4 needles per day. E11.39 150 each 4     predniSONE (DELTASONE) 20 MG tablet Take 2 tablets (40 mg total) by mouth daily for 5 days. 10 tablet 0     rosuvastatin (CRESTOR) 20 MG tablet Take 1 tablet (20 mg total) by mouth daily. 30 tablet 11     spironolactone (ALDACTONE) 25 MG tablet TAKE 1 PILL BY MOUTH EVERY DAY/TXHUA HNUB NOJ 1 LUB TSHUAJ 90 tablet 2     triamcinolone (KENALOG) 0.1 % cream Apply up to  Twice daily to itchy skin not on face until resolved then use sparingly 80 g 1     No current facility-administered medications for this visit.                            "

## 2021-06-19 NOTE — PROGRESS NOTES
ASSESSMENT & PLAN    No problem-specific Assessment & Plan notes found for this encounter.      Nav was seen today for cough.    Diagnoses and all orders for this visit:    Atherosclerosis of coronary artery of native heart with angina pectoris, unspecified vessel or lesion type (H)    Cough    Postnasal drip    Environmental allergies    Stenosis of larynx    Other orders  -     fluticasone-vilanterol (BREO ELLIPTA) 100-25 mcg/dose DsDv inhaler; Inhale 1 puff daily. until  -     predniSONE (DELTASONE) 20 MG tablet; Take 1 tablet (20 mg total) by mouth daily for 5 days.  -     codeine-guaiFENesin (GUAIFENESIN AC)  mg/5 mL liquid; Take 10 mL by mouth 4 (four) times a day as needed for cough.  -     loratadine (CLARITIN) 10 mg tablet; Take 1 tablet (10 mg total) by mouth daily. For congestion and allergies        Return if symptoms worsen or fail to improve.            CHIEF COMPLAINT: Nav Laurent had concerns including Cough.    [place]: 1.............. had concerns including Cough.    1. Atherosclerosis of coronary artery of native heart with angina pectoris, unspecified vessel or lesion type (H)    2. Cough    3. Postnasal drip    4. Environmental allergies    5. Stenosis of larynx          CC:             Cough      Ongoing for 4 days  Postnasal drip  No fevers chills or sweats  No sore throat  No exposure to anybody's been sick  No travel  Not worse when she is lying down necessarily  Nonexertional  Known history of heart disease does not feel like it is her heart  No tobacco use  No pleuritic chest pain next    What's it like in one word?:                                              Cough  How long is it ongoing: days, weeks,months?:              4  Days  What makes it worse: activity? Eating? Movement? :      Continues  What makes it better?:                                                    Nothing  0/10-10/10:Pain/Intesity                                                  mild      Any associated Sx to  above complaint: Postnasal drip    Any other Problems in order of Priority:    Diabetic last A1c was 8.8      SUBJECTIVE:  Nav Laurent is a 77 y.o. female    Past Medical History:   Diagnosis Date     Alternating esotropia      Anemia      Anemia      Anxiety      Atherosclerosis      CHF (congestive heart failure) (H)      COPD (chronic obstructive pulmonary disease) (H)      Coronary artery disease     status post MI times two with BMS to LAD     Depression      Diabetes mellitus (H)      Dysphagia      Dyspnea      Gall bladder disease      GERD (gastroesophageal reflux disease)      Hyperlipidemia      Hypertension      Ischemic cardiomyopathy      Ischemic cardiomyopathy     EF 20-25% - follows with Dr. Freed     Murmur      Nonproliferative diabetic retinopathy (H)      Occult blood positive stool 05/29/2014     Stenosis of larynx      Stroke (H)      Vitamin D deficiency      Past Surgical History:   Procedure Laterality Date     CARDIAC CATHETERIZATION       ESOPHAGOGASTRODUODENOSCOPY N/A 2/7/2017    Procedure: ESOPHAGOGASTRODUODENOSCOPY ;  Surgeon: Mitchell Mcbride MD;  Location: [facility];  Service:      TRACHEOSTOMY       Review of patient's allergies indicates no known allergies.  Current Outpatient Prescriptions   Medication Sig Dispense Refill     acetaminophen 500 mg coapsule Take 1,000 mg by mouth 2 (two) times a day as needed for pain (back pain). 100 capsule 1     albuterol (PROVENTIL) 2.5 mg /3 mL (0.083 %) nebulizer solution Take 3 mL (2.5 mg total) by nebulization every 6 (six) hours as needed for wheezing. 75 mL 12     alcohol swabs (ALCOHOL PREP PADS) PadM USE FOUR TIMES A DAY AS DIRECTED/ SIV 4 ZAUG IB HNUB LI TUS ELIZABETH MOB QHIA 200 each 10     aspirin 325 MG EC tablet Take 1 tablet (325 mg total) by mouth daily. 100 tablet 2     blood glucose test (ONETOUCH ULTRA TEST) strips TEST BLOOD SUGARS TWICE TIMES DAILY -- BEFORE MEALS 100 strip 11     blood pressure monitor Kit Check blood  "pressure daily 1 each 0     blood pressure test kit-wrist Kit Test blood sugars twice daily 100 each prn     carvedilol (COREG) 12.5 MG tablet Take 1 tablet (12.5 mg total) by mouth 2 (two) times a day with meals. 60 tablet 2     furosemide (LASIX) 20 MG tablet Take 1 tablet (20 mg total) by mouth 2 (two) times a day at 9am and 6pm. 180 tablet 0     hydrocortisone 2.5 % cream Apply omid daily to itchy back as needed 453 g 1     insulin glargine (LANTUS SOLOSTAR U-100 INSULIN) 100 unit/mL (3 mL) pen 14 units nightly adjust based on first AM sugar Do not mix Lantus with any other insulin 15 mL 3     ipratropium-albuterol (COMBIVENT RESPIMAT)  mcg/actuation Mist inhaler Inhale 1 puff 4 (four) times a day as needed. 1 Inhaler 11     isosorbide mononitrate (IMDUR) 30 MG 24 hr tablet Take 1 tablet (30 mg total) by mouth daily. 30 tablet 11     ketotifen (ZADITOR/ZYRTEC ITCHY EYES) 0.025 % (0.035 %) ophthalmic solution 1 drop affected eye for itching and watering twice daily as needed 19 mL prn     linagliptin 5 mg Tab Take 1 tablet (5 mg total) by mouth once daily. With or without food 30 tablet 2     losartan (COZAAR) 50 MG tablet Take 1 tablet by mouth daily.  5     methylcellulose (CITRUCEL) 500 mg Tab Take 1 tablet (500 mg total) by mouth daily. With a 8 oz glass of water 30 tablet 5     nut.tx.gluc.intol,lac-free,soy (GLUCERNA SHAKE) Liqd Drink 1 bottle three times daily 90 Bottle 11     omeprazole (PRILOSEC) 20 MG capsule Take 20 mg by mouth daily as needed.       pen needle, diabetic (NOVOFINE 32) 32 gauge x 1/4\" Ndle 32 G x 6 MM - Used to inject Novolog and Lantus insulin. Need 4 needles per day. E11.39 150 each 4     rosuvastatin (CRESTOR) 20 MG tablet Take 1 tablet (20 mg total) by mouth daily. 30 tablet 11     spironolactone (ALDACTONE) 25 MG tablet TAKE 1 PILL BY MOUTH EVERY DAY/TXHUA HNUB NOJ 1 LUB TSHUAJ 90 tablet 2     triamcinolone (KENALOG) 0.1 % cream Apply up to  Twice daily to itchy skin not on " face until resolved then use sparingly 80 g 1     codeine-guaiFENesin (GUAIFENESIN AC)  mg/5 mL liquid Take 10 mL by mouth 4 (four) times a day as needed for cough. 236 mL 0     fluticasone-vilanterol (BREO ELLIPTA) 100-25 mcg/dose DsDv inhaler Inhale 1 puff daily. until 1 each 0     loratadine (CLARITIN) 10 mg tablet Take 1 tablet (10 mg total) by mouth daily. For congestion and allergies 30 tablet 11     nitroglycerin (NITROSTAT) 0.4 MG SL tablet Place 1 tablet (0.4 mg total) under the tongue every 5 (five) minutes as needed for chest pain. 30 tablet 0     predniSONE (DELTASONE) 20 MG tablet Take 1 tablet (20 mg total) by mouth daily for 5 days. 5 tablet 0     No current facility-administered medications for this visit.      No family history on file.  Social History     Social History     Marital status:      Spouse name: N/A     Number of children: N/A     Years of education: N/A     Social History Main Topics     Smoking status: Never Smoker     Smokeless tobacco: Never Used     Alcohol use No     Drug use: No     Sexual activity: Not Currently     Other Topics Concern     None     Social History Narrative    Lives with family     Patient Active Problem List   Diagnosis     Atherosclerosis     Alternating Esotropia     Other emphysema (H)     Murmurs     Vitamin D Deficiency     Mixed hyperlipidemia     Nonproliferative Retinopathy     Coronary Artery Disease     Ischemic cardiomyopathy     Ischemic Stroke  left side weakness arms/legs     Stenosis Of Larynx     Esophageal Reflux     Benign essential hypertension     Anemia     Hyperlipidemia     CKD (chronic kidney disease) stage 3, GFR 30-59 ml/min     Iron deficiency anemia     Myofascial pain syndrome     Lumbago     Joint Pain, Localized in the Shoulder      Cervicalgia     Hypertension     Pain of upper abdomen     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 She has never used smokeless tobacco. She reports that she does not drink alcohol or use illicit drugs.    REVIEW OF SYSTEMS:   Family history not pertinent to chief complaint or presenting problem    Review of Systems:     Nervous System: No headache, dizziness, fainting or memory loss. No tingling sensation of hand or feet.  Ears: No hearing loss or ringing in the ears  Eyes: No blurring of vision, redness, itching or dryness.  Nose: No nosebleed or loss of smell positive postnasal drip  Mouth: No mouth sores or loss of taste  Throat: Always has hoarseness or difficulty swallowing  Neck: No enlarged thyroid or lymph nodes.  Heart: No chest pain, palpitation or irregular heartbeat. No swelling of hands or feet  Lungs: Minimal new shortness of breath, positive cough, night sweats, wheezing or hemoptysis.  Gastrointestinal: No nausea or vomiting, constipation or diarrhea. No acid reflux, abdominal pain or blood in stools.  Kidney/Bladdr: No polyuria, polydipsia, nocturia or hematuria.  Genital/Sexual: No loss of libido No Sex function Changes  Skin: No rash, hair loss or hirsutism.   Muscles/Joints/Bones: No morning stiffness, muscle aches and pain or loss of height.      Review of systems otherwise negative as requested from patient, except   Those positive ROS outlined and discussed in [place] [employer] [place].    OBJECTIVE:  /68 (Patient Site: Right Arm, Patient Position: Sitting, Cuff Size: Adult Regular)  Pulse 67  Ht 4' 8\" (1.422 m)  Wt 108 lb (49 kg)  SpO2 100%  BMI 24.21 kg/m2    GENERAL:     No acute distress.   Alert and oriented X 3         Physical:  Old scar tracheal area  Eye old deviation   Tm clear no evidence of fluid or " injection  Nasal congestion nasal polyp  Mild rhonchi no clear-cut wheezing  Regular + S3  Soft abdomen  Trace edema  Supple thigh  Oral clear no evidence of thrush  No skin rash      ASSESSMENT & PLAN      Nav was seen today for cough.    Diagnoses and all orders for this visit:    Atherosclerosis of coronary artery of native heart with angina pectoris, unspecified vessel or lesion type (H)    Cough    Postnasal drip    Environmental allergies    Stenosis of larynx    Other orders  -     fluticasone-vilanterol (BREO ELLIPTA) 100-25 mcg/dose DsDv inhaler; Inhale 1 puff daily. until  -     predniSONE (DELTASONE) 20 MG tablet; Take 1 tablet (20 mg total) by mouth daily for 5 days.  -     codeine-guaiFENesin (GUAIFENESIN AC)  mg/5 mL liquid; Take 10 mL by mouth 4 (four) times a day as needed for cough.  -     loratadine (CLARITIN) 10 mg tablet; Take 1 tablet (10 mg total) by mouth daily. For congestion and allergies    No history of heart disease with cardiomyopathy however this has more of a bronchospasm component with postnasal drip likely secondary to allergies plan 1 month [place] if covered  Prednisone short burst  Robitussin with codeine for cough  Claritin  Follow-up if she has any worsening symptoms worsening shortness of breath chest pain chest discomfort she should proceed to the emergency room    Return if symptoms worsen or fail to improve.       Anticipatory Guidance and Symptomatic Cares Discussed   Advised to call back directly if there are further questions, or if these symptoms fail to improve as anticipated or worsen.  Return to clinic if patient has a clinical concern that warrants an exam.         I spent 20 minutes with this patient face to face, of which 50% or greater was spent in counseling and coordination of care with regards to Nav was seen today for cough.    Diagnoses and all orders for this visit:    Atherosclerosis of coronary artery of native heart with angina pectoris, unspecified  vessel or lesion type (H)    Cough    Postnasal drip    Environmental allergies    Stenosis of larynx    Other orders  -     fluticasone-vilanterol (BREO ELLIPTA) 100-25 mcg/dose DsDv inhaler; Inhale 1 puff daily. until  -     predniSONE (DELTASONE) 20 MG tablet; Take 1 tablet (20 mg total) by mouth daily for 5 days.  -     codeine-guaiFENesin (GUAIFENESIN AC)  mg/5 mL liquid; Take 10 mL by mouth 4 (four) times a day as needed for cough.  -     loratadine (CLARITIN) 10 mg tablet; Take 1 tablet (10 mg total) by mouth daily. For congestion and allergies        Tomas Salazar MD  [facility] [id number] [phone]

## 2021-06-20 NOTE — PROGRESS NOTES
[facility]  OFFICE VISIT - FAMILY MEDICINE     ASSESSMENT AND PLAN     1. Type 2 diabetes mellitus with other specified complication, unspecified long term insulin use status (H)  Requests refill insulin, test strips today. Denies hypoglycemia.   blood glucose test (ONETOUCH ULTRA TEST) strips   2. COPD (chronic obstructive pulmonary disease) (H)  Needs refill inhalers. Lungs clear today and patient reports COPD has been well controlled. Requests short course of prednisone to have on hand for while she is in [place].    3. Coronary artery disease involving native coronary artery of native heart without angina pectoris  Requests refill today.   nitroglycerin (NITROSTAT) 0.4 MG SL tablet   Patient will follow up with Dr. Salazar upon return from [place]. She will bring inhalers with her to next visit.     CHIEF COMPLAINT     Medication Management (going to [place] x 5 weeks)    HPI     Nav Laurent is a 77 y.o. female with past medical history as below who presents today for medication refill. Will be going to [place] to stay with family x 5 weeks and needs medication refills. Feeling well today overall. No CP or shortness of breath. No fevers, chills, nausea, vomiting or diarrhea.    COPD exacerbation at last visit. Reports symptoms improved with course of prednisone. Taking inhalers as prescribed, needs refill, does not have inhalers with her today. Would like rx for prednisone to take with her on her trip in case of worsening respiratory symptoms.     Reports blood sugars have been well controlled at home. Denies hypoglycemia. Takes novolog 4 units with meals, lantus 14 units nightly, needs refill.      Review of Systems  12-point review of systems completed and as per HPI, otherwise negative.     [facility]     Past Medical History:   Diagnosis Date     Alternating esotropia      Anemia      Anemia      Anxiety      Atherosclerosis      CHF (congestive heart failure) (H)      COPD (chronic obstructive  pulmonary disease) (H)      Coronary artery disease     status post MI times two with BMS to LAD     Depression      Diabetes mellitus (H)      Dysphagia      Dyspnea      Gall bladder disease      GERD (gastroesophageal reflux disease)      Hyperlipidemia      Hypertension      Ischemic cardiomyopathy      Ischemic cardiomyopathy     EF 20-25% - follows with Dr. Freed     Murmur      Nonproliferative diabetic retinopathy (H)      Occult blood positive stool 05/29/2014     Stenosis of larynx      Stroke (H)      Vitamin D deficiency      Past Surgical History:   Procedure Laterality Date     CARDIAC CATHETERIZATION       ESOPHAGOGASTRODUODENOSCOPY N/A 2/7/2017    Procedure: ESOPHAGOGASTRODUODENOSCOPY ;  Surgeon: Mitchell Mcbride MD;  Location: [facility];  Service:      TRACHEOSTOMY         PFSH   No family history on file.  Social History     Social History     Marital status:      Spouse name: N/A     Number of children: N/A     Years of education: N/A     Occupational History     Not on file.     Social History Main Topics     Smoking status: Never Smoker     Smokeless tobacco: Never Used     Alcohol use No     Drug use: No     Sexual activity: Not Currently     Other Topics Concern     Not on file     Social History Narrative    Lives with family     Relevant history was reviewed with the patient today, unless noted in HPI, is not pertinent for this visit.    MEDICATIONS     Current Outpatient Prescriptions on File Prior to Visit   Medication Sig Dispense Refill     acetaminophen 500 mg coapsule Take 1,000 mg by mouth 2 (two) times a day as needed for pain (back pain). 100 capsule 1     albuterol (PROVENTIL) 2.5 mg /3 mL (0.083 %) nebulizer solution Take 3 mL (2.5 mg total) by nebulization every 6 (six) hours as needed for wheezing. 75 mL 12     alcohol swabs (ALCOHOL PREP PADS) PadM USE FOUR TIMES A DAY AS DIRECTED/ SIV 4 ZAUG IB HNUB LI TUS ELIZABETH MOB QHIA 200 each 10     aspirin 325 MG EC tablet  Take 1 tablet (325 mg total) by mouth daily. 100 tablet 2     blood glucose test (ONETOUCH ULTRA TEST) strips TEST BLOOD SUGARS TWICE TIMES DAILY -- BEFORE MEALS 100 strip 11     blood pressure monitor Kit Check blood pressure daily 1 each 0     blood pressure test kit-wrist Kit Test blood sugars twice daily 100 each prn     carvedilol (COREG) 12.5 MG tablet Take 1 tablet (12.5 mg total) by mouth 2 (two) times a day with meals. 60 tablet 2     fluticasone-vilanterol (BREO ELLIPTA) 100-25 mcg/dose DsDv inhaler Inhale 1 puff daily. until 1 each 0     furosemide (LASIX) 20 MG tablet Take 1 tablet (20 mg total) by mouth 2 (two) times a day at 9am and 6pm. 180 tablet 0     hydrocortisone 2.5 % cream Apply omid daily to itchy back as needed 453 g 1     insulin glargine (LANTUS SOLOSTAR U-100 INSULIN) 100 unit/mL (3 mL) pen 14 units nightly adjust based on first AM sugar Do not mix Lantus with any other insulin 15 mL 3     ipratropium-albuterol (COMBIVENT RESPIMAT)  mcg/actuation Mist inhaler Inhale 1 puff 4 (four) times a day as needed. 1 Inhaler 11     isosorbide mononitrate (IMDUR) 30 MG 24 hr tablet Take 1 tablet (30 mg total) by mouth daily. 30 tablet 11     ketotifen (ZADITOR/ZYRTEC ITCHY EYES) 0.025 % (0.035 %) ophthalmic solution 1 drop affected eye for itching and watering twice daily as needed 19 mL prn     loratadine (CLARITIN) 10 mg tablet Take 1 tablet (10 mg total) by mouth daily. For congestion and allergies 30 tablet 11     losartan (COZAAR) 50 MG tablet Take 1 tablet by mouth daily.  5     methylcellulose (CITRUCEL) 500 mg Tab Take 1 tablet (500 mg total) by mouth daily. With a 8 oz glass of water 30 tablet 5     nitroglycerin (NITROSTAT) 0.4 MG SL tablet Place 1 tablet (0.4 mg total) under the tongue every 5 (five) minutes as needed for chest pain. 30 tablet 0     nut.tx.gluc.intol,lac-free,soy (GLUCERNA SHAKE) Liqd Drink 1 bottle three times daily 90 Bottle 11     omeprazole (PRILOSEC) 20 MG  "capsule Take 20 mg by mouth daily as needed.       pen needle, diabetic (NOVOFINE 32) 32 gauge x 1/4\" Ndle 32 G x 6 MM - Used to inject Novolog and Lantus insulin. Need 4 needles per day. E11.39 150 each 4     rosuvastatin (CRESTOR) 20 MG tablet Take 1 tablet (20 mg total) by mouth daily. 30 tablet 11     TRADJENTA 5 mg Tab TAKE 1 TABLET (5 MG TOTAL) BY MOUTH ONCE DAILY. WITH OR WITHOUT FOOD/ TXHUA HNUB NOJ 1 LUB LI TUS ELIZABETH MOB QHIA PAB ZOO NTSHAV QAB ZIB 30 tablet 5     triamcinolone (KENALOG) 0.1 % cream Apply up to  Twice daily to itchy skin not on face until resolved then use sparingly 80 g 1     No current facility-administered medications on file prior to visit.        OBJECTIVE   /62 (Patient Site: Left Arm, Patient Position: Sitting, Cuff Size: Adult Regular)  Ht 4' 8\" (1.422 m)  Wt 107 lb (48.5 kg)  BMI 23.99 kg/m2    Physical Exam  Physical Examination: General appearance - alert, well appearing, and in no distress  Mental status - alert, oriented to person, place, and time  Chest - clear to auscultation, no wheezes, rales or rhonchi, symmetric air entry  Heart - normal rate, regular rhythm, normal S1, S2, no murmurs, rubs, clicks or gallops  Neurological - alert, oriented, normal speech, no focal findings or movement disorder noted  Musculoskeletal - no joint tenderness, deformity or swelling, gait steady with cane  Extremities - peripheral pulses normal, no pedal edema, no clubbing or cyanosis  Skin - normal coloration and turgor, no rashes, no suspicious skin lesions noted      RESULTS/CONSULTS (Lab/Radiology)   No results found for this or any previous visit (from the past 168 hour(s)).    HEALTH MAINTENANCE / SCREENING     PHQ-2 Total Score: 0 (3/9/2018  1:47 PM), No Data Recorded,No Data Recorded    Health Maintenance   Topic Date Due     INFLUENZA VACCINE RULE BASED (1) 08/01/2018     DIABETES HEMOGLOBIN A1C  10/10/2018     DIABETES FOLLOW-UP  10/23/2018     DIABETES OPHTHALMOLOGY EXAM  " 10/31/2018     DXA SCAN  11/14/2018     FALL RISK ASSESSMENT  11/21/2018     FECAL OCCULT BLOOD/FIT ANNUAL COLON CANCER SCREENING  11/27/2018     DIABETES URINE MICROALBUMIN  04/10/2019     DIABETES FOOT EXAM  04/23/2019     ADVANCE DIRECTIVES DISCUSSED WITH PATIENT  04/23/2023     TD 18+ HE  09/05/2027     PNEUMOCOCCAL POLYSACCHARIDE VACCINE AGE 65 AND OVER  Completed     PNEUMOCOCCAL CONJUGATE VACCINE FOR ADULTS (PCV13 OR PREVNAR)  Completed     ZOSTER VACCINE  Completed       Lolis Noland, CNP  Family Medicine, [facility]

## 2021-06-23 NOTE — TELEPHONE ENCOUNTER
Refill Approved    Rx renewed per Medication Renewal Policy. Medication was last renewed on 8/6/18.    Noreen Paniagua, [facility] Triage/Med Refill 1/30/2019     Requested Prescriptions   Pending Prescriptions Disp Refills     TRADJENTA 5 mg Tab [Pharmacy Med Name: TRADJENTA 5 MG TABLET 5 TAB] 30 tablet 5     Sig: TAKE 1 TABLET (5 MG TOTAL) BY MOUTH ONCE DAILY. WITH OR WITHOUT FOOD/ TXHUA HNUB NOJ 1 LUB XIMENA JOHNSON MOB KELLEYDAMI GARCIA RENETTA NTSHAV QAB ZIB    Oral Hypoglycemics Refill Protocol Passed - 1/29/2019  1:23 PM       Passed - Visit with PCP or prescribing provider visit in last 6 months      Last office visit with prescriber/PCP: Visit date not found OR same dept: 8/23/2018 Lolis Noland CNP OR same specialty: 8/23/2018 Lolis Noland CNP Last physical: Visit date not found Last MTM visit: Visit date not found         Next appt within 3 mo: 1/29/2019 Tomas Salazar MD  Next physical within 3 mo: Visit date not found  Prescriber OR PCP: Tomas Salazar MD  Last diagnosis associated with med order: 1. Type 2 diabetes mellitus with retinopathy, with long-term current use of insulin, macular edema presence unspecified, unspecified laterality, unspecified retinopathy severity  - TRADJENTA 5 mg Tab [Pharmacy Med Name: TRADJENTA 5 MG TABLET 5 TAB]; TAKE 1 TABLET (5 MG TOTAL) BY MOUTH ONCE DAILY. WITH OR WITHOUT FOOD/ TXHUA HNUB NOJ 1 LUB XIMENA JOHNSON MOB TRINA JACKSON NTSHAV QAB ZIB  Dispense: 30 tablet; Refill: 5     If protocol passes may refill for 12 months if within 3 months of last provider visit (or a total of 15 months).          Passed - A1C in last 6 months    Hemoglobin A1c   Date Value Ref Range Status   01/29/2019 8.2 (H) 3.5 - 6.0 % Final              Passed - Microalbumin in last year     Microalbumin, Random Urine   Date Value Ref Range Status   04/10/2018 36.34 (H) 0.00 - 1.99 mg/dL Final                 Passed - Blood pressure in last year    BP Readings from Last 1 Encounters:   01/29/19  128/70            Passed - Serum creatinine in last year    Creatinine   Date Value Ref Range Status   01/29/2019 1.43 (H) 0.60 - 1.10 mg/dL Final

## 2021-06-23 NOTE — PROGRESS NOTES
ASSESSMENT & PLAN    No problem-specific Assessment & Plan notes found for this encounter.      Nav was seen today for back pain and diabetes.    Diagnoses and all orders for this visit:    Type 2 diabetes mellitus with retinopathy, with long-term current use of insulin, macular edema presence unspecified, unspecified laterality, unspecified retinopathy severity (H)  -     Glycosylated Hemoglobin A1c    Chronic left-sided low back pain with left-sided sciatica  -     MR Lumbar Spine Without Contrast; Future  -     Urinalysis-UC if Indicated  -     Culture, Urine    Anemia, unspecified type  -     Iron and Transferrin Iron Binding Capacity  -     HM1(CBC and Differential)  -     Comprehensive Metabolic Panel  -     Cancel: Chlamydia trachomatis & Neisseria gonorrhoeae, Amplified Detection  -     HM1 (CBC with Diff)    Screen for colon cancer  -     Cancel: Occult Blood(ICT)  -     Occult Blood(ICT); Future    Coronary artery disease involving native coronary artery of native heart without angina pectoris  -     nitroglycerin (NITROSTAT) 0.4 MG SL tablet; Place 1 tablet (0.4 mg total) under the tongue every 5 (five) minutes as needed for chest pain.    Other orders  -     gabapentin (NEURONTIN) 100 MG capsule; Take 100 mg by mouth daily for 7 days, THEN 100 mg 2 (two) times a day for 7 days, THEN 100 mg 3 (three) times a day for 7 days.        Patient Instructions   Gabapentin for pain  100 mg daily for 7 days then  100 mg twice a day for 7 days then  100 mg 3 times a day  Accomplish her MRI and follow-up in 1 month's time    Also use Tiger balm on your back for pain    Please bring all of your active medications and so we can catalog and update your patient list          Return in about 1 month (around 2/28/2019).       Little interest or pleasure in doing things: Not at all  Feeling down, depressed, or hopeless: Not at all    CHIEF COMPLAINT: Nav Laurent had concerns including Back Pain and Diabetes.    Three Affiliated:  "1.............. had concerns including Back Pain and Diabetes.    1. Type 2 diabetes mellitus with retinopathy, with long-term current use of insulin, macular edema presence unspecified, unspecified laterality, unspecified retinopathy severity (H)    2. Chronic left-sided low back pain with left-sided sciatica    3. Anemia, unspecified type    4. Screen for colon cancer    5. Coronary artery disease involving native coronary artery of native heart without angina pectoris          CC:             Why are you here today?                             Pain comes in today with an   She has had ongoing back pain  She describes physical therapy is \"not helpful  Acupuncture not helpful  Chiropractic care not helpful  He describes low back pain into her mid sacral area tailbone is been ongoing for over a year sometimes it makes hard to walk  Travels to the left side to left foot  Worse with standing for more than 20 minutes  As well as sleeping on her back and elevating her leg  Better with sitting still and massage  She wishes to have massage therapy    Currently has known coronary disease  Medications reviewed but not verified because of patient missing her medications  Cozaar 50 mg daily  Carvedilol 12.5 mg twice daily  Imdur 30 mg daily  Crestor 20 mg daily  Furosemide 20 mg daily    She has had a CVA in 2009  Diabetes her blood sugars of been between 105 and 300    30-day average I reviewed was 100 8714-day average 149 7-day average 157  I did not see any low blood sugars        No other new complaints  Lives with her family  Typically comes with  may or [place]int[place]    She describes her PHQ  to its 0 on 1/29/2019      Any other Problems in order of Priority:        SUBJECTIVE:  Nav Laurent is a 78 y.o. female    Past Medical History:   Diagnosis Date     Alternating esotropia      Anemia      Anemia      Anxiety      Atherosclerosis      CHF (congestive heart failure) (H)      COPD (chronic obstructive " pulmonary disease) (H)      Coronary artery disease     status post MI times two with BMS to LAD     Depression      Diabetes mellitus (H)      Dysphagia      Dyspnea      Gall bladder disease      GERD (gastroesophageal reflux disease)      Hyperlipidemia      Hypertension      Ischemic cardiomyopathy      Ischemic cardiomyopathy     EF 20-25% - follows with Dr. Freed     Murmur      Nonproliferative diabetic retinopathy (H)      Occult blood positive stool 05/29/2014     Stenosis of larynx      Stroke (H)      Vitamin D deficiency      Past Surgical History:   Procedure Laterality Date     CARDIAC CATHETERIZATION       ESOPHAGOGASTRODUODENOSCOPY N/A 2/7/2017    Procedure: ESOPHAGOGASTRODUODENOSCOPY ;  Surgeon: Mitchell Mcbride MD;  Location: [facility] [place];  Service:      TRACHEOSTOMY       Patient has no known allergies.  Current Outpatient Medications   Medication Sig Dispense Refill     acetaminophen 500 mg coapsule Take 1,000 mg by mouth 2 (two) times a day as needed for pain (back pain). 100 capsule 1     albuterol (PROVENTIL) 2.5 mg /3 mL (0.083 %) nebulizer solution Take 3 mL (2.5 mg total) by nebulization every 6 (six) hours as needed for wheezing. 75 mL 12     alcohol swabs (ALCOHOL PREP PADS) PadM USE FOUR TIMES A DAY AS DIRECTED/ SIV 4 ZAUG IB HNUB LI TUS ELIZABETH MOB QHIA 200 each 10     aspirin 325 MG EC tablet Take 1 tablet (325 mg total) by mouth daily. 100 tablet 2     blood pressure monitor Kit Check blood pressure daily 1 each 0     blood pressure test kit-wrist Kit Test blood sugars twice daily 100 each prn     BREO ELLIPTA 100-25 mcg/dose DsDv inhaler INHALE 1 PUFF BY MOUTH DAILY/ TXHUA HNUB NQUS 1 PA 60 each 1     carvedilol (COREG) 12.5 MG tablet Take 1 tablet (12.5 mg total) by mouth 2 (two) times a day with meals. 60 tablet 2     furosemide (LASIX) 20 MG tablet Take 1 tablet (20 mg total) by mouth 2 (two) times a day at 9am and 6pm. 180 tablet 0     hydrocortisone 2.5 % cream Apply omid daily  "to itchy back as needed 453 g 1     insulin aspart U-100 (NOVOLOG FLEXPEN U-100 INSULIN) 100 unit/mL injection pen Inject 4 Units under the skin 3 (three) times a day with meals. 3 Pre-filled Pen Syringe 1     insulin glargine (LANTUS SOLOSTAR U-100 INSULIN) 100 unit/mL (3 mL) pen 14 units nightly adjust based on first AM sugar Do not mix Lantus with any other insulin 15 mL 1     ipratropium-albuterol (COMBIVENT RESPIMAT)  mcg/actuation Mist inhaler Inhale 1 puff 4 (four) times a day as needed. 1 Inhaler 1     isosorbide mononitrate (IMDUR) 30 MG 24 hr tablet Take 1 tablet (30 mg total) by mouth daily. 30 tablet 11     ketotifen (ZADITOR/ZYRTEC ITCHY EYES) 0.025 % (0.035 %) ophthalmic solution 1 drop affected eye for itching and watering twice daily as needed 19 mL prn     loratadine (CLARITIN) 10 mg tablet Take 1 tablet (10 mg total) by mouth daily. For congestion and allergies 30 tablet 11     losartan (COZAAR) 50 MG tablet Take 1 tablet by mouth daily.  5     methylcellulose (CITRUCEL) 500 mg Tab Take 1 tablet (500 mg total) by mouth daily. With a 8 oz glass of water 30 tablet 5     nitroglycerin (NITROSTAT) 0.4 MG SL tablet Place 1 tablet (0.4 mg total) under the tongue every 5 (five) minutes as needed for chest pain. 60 tablet 1     nut.tx.gluc.intol,lac-free,soy (GLUCERNA SHAKE) Liqd Drink 1 bottle three times daily 90 Bottle 11     omeprazole (PRILOSEC) 20 MG capsule TAKE 1 CAPSULE (20 MG TOTAL) BY MOUTH 2 (TWO) TIMES A DAY BEFORE MEALS/ TXHUA HNUB NOJ 1 LUB TSHUAJ 2 ZAUG UA NTEJ NOJ PLUAG MOV PAB ZOO BRAYDEN 180 capsule 2     pen needle, diabetic (NOVOFINE 32) 32 gauge x 1/4\" Ndle 32 G x 6 MM - Used to inject Novolog and Lantus insulin. Need 4 needles per day. E11.39 150 each 4     rosuvastatin (CRESTOR) 20 MG tablet TAKE 1 PILL (20 MG TOTAL) BY MOUTH EVERY DAY/CHRISTOPHER HNUB NOJ 1 LUB TSHUAJ PAB ZOO NTSHAV MUAJ ROJ 90 tablet 3     triamcinolone (KENALOG) 0.1 % cream Apply up to  Twice daily to itchy skin not " on face until resolved then use sparingly 80 g 1     blood glucose meter (ONETOUCH VERIO IQ METER) Use to check blood sugars three times daily. Dispense glucometer brand per patient's insurance at pharmacy discretion. 1 each 0     blood glucose test (ONETOUCH ULTRA TEST) strips Use to check blood sugars three times daily. Dispense glucometer brand per patient's insurance at pharmacy discretion 300 strip 11     gabapentin (NEURONTIN) 100 MG capsule Take 100 mg by mouth daily for 7 days, THEN 100 mg 2 (two) times a day for 7 days, THEN 100 mg 3 (three) times a day for 7 days. 90 capsule 1     lancets (ONETOUCH DELICA LANCETS) 33 gauge Misc Use to check blood sugars three times daily. Dispense glucometer brand per patient's insurance at pharmacy discretion 300 each 11     TRADJENTA 5 mg Tab TAKE 1 TABLET (5 MG TOTAL) BY MOUTH ONCE DAILY. WITH OR WITHOUT FOOD/ TXHUA HNUB NOJ 1 LUB LI TUS ELIZABETH MOB QHIA PAB ZOO NTSHAV QAB ZIB 90 tablet 3     No current facility-administered medications for this visit.      No family history on file.  Social History     Socioeconomic History     Marital status:      Spouse name: None     Number of children: None     Years of education: None     Highest education level: None   Social Needs     Financial resource strain: None     Food insecurity - worry: None     Food insecurity - inability: None     Transportation needs - medical: None     Transportation needs - non-medical: None   Occupational History     None   Tobacco Use     Smoking status: Never Smoker     Smokeless tobacco: Never Used   Substance and Sexual Activity     Alcohol use: No     Drug use: No     Sexual activity: Not Currently   Other Topics Concern     None   Social History Narrative    Lives with family     Patient Active Problem List   Diagnosis     Atherosclerosis     Alternating Esotropia     Other emphysema (H)     Murmurs     Vitamin D Deficiency     Mixed hyperlipidemia     Nonproliferative Retinopathy      Coronary Artery Disease     Ischemic cardiomyopathy     Ischemic Stroke  left side weakness arms/legs     Stenosis Of Larynx     Esophageal Reflux     Benign essential hypertension     Anemia     Hyperlipidemia     CKD (chronic kidney disease) stage 3, GFR 30-59 ml/min (H)     Iron deficiency anemia     Myofascial pain syndrome     Lumbago     Joint Pain, Localized in the Shoulder      Cervicalgia     Hypertension     Pain of upper abdomen     Type 2 diabetes mellitus with ophthalmic manifestations (H)     Bruit of right carotid artery     Calculus of gallbladder without cholecystitis     Coronary artery disease involving native coronary artery of native heart without angina pectoris     LBBB (left bundle branch block)     Age-related cataract     Cough                                              SOCIAL: She  reports that  has never smoked. she has never used smokeless tobacco. She reports that she does not drink alcohol or use drugs.    REVIEW OF SYSTEMS:   Family history not pertinent to chief complaint or presenting problem    Review of Systems:      Nervous System:  No headache, paresthesia or dizziness or fainting                                  Ears: No hearing loss or ringing in the ears    Eyes: No blurring of vision, Double Vision            No redness, itching or dryness.  Does have strabismus chronic    Nose: No nosebleed or loss of smell    Mouth: No mouth sores or  coated tongue    Throat: No hoarseness or difficulty swallowing    Neck: No enlarged thyroid or lymph nodes.    Heart: No recent change chest pain, palpitation or irregular heartbeat.                  Lungs: No no worsening shortness of breath, wheezing or hemoptysis.    Gastrointestinal: No nausea or vomiting, melena or blood in stools.    Kidney/Bladdr: No polyuria, polydipsia, or hematuria.                             Genital/Sexual: No Sex function Changes                                Skin: No rash    Muscles/Joints/Bones: No Muscle  "morning stiffness, No Effusion of a Joint history of present illness above    Review of systems otherwise negative as requested from patient, except   Those positive ROS outlined and discussed in [place].    OBJECTIVE:  /70 (Patient Site: Right Arm, Patient Position: Sitting, Cuff Size: Adult Regular)   Pulse 74   Ht 4' 8\" (1.422 m)   Wt 108 lb (49 kg)   SpO2 98%   BMI 24.21 kg/m       GENERAL:     No acute distress.   Alert and oriented X 3         Physical:    Speaks with a raspy voice  Old tracheostomy scar  No jugular venous distention  Lungs today clear no crackles  Cardiac S1-S2 is in regular sinus rhythm  Pulse rate 74 pressure 120/70  Saturations 98%  Abdomen soft she has presence of bowel bowel sounds  Her strength testing shows a positive straight leg raise on the left  Some tenderness to palpation across the lower spine L4-5 L5-S1  Some tenderness to palpation of the sciatic notch  And gluteal complex    Does have slight weakness  sciatic notch with flexion extension at the left foot    Labs ordered today A1c 8.2  Creatinine stable 1.43 down from 1.6 7/24/2018  Sodium 138 potassium 5.0  Urinalysis shows glucose with proteinuria  She currently is on losartan  Urine culture shows no growth  Future occult blood testing were ordered  Her hemoglobin 12.2 white blood count 5.3 her hemoglobin is up from 11.7 and 6/8/2018          ASSESSMENT & PLAN    Given her recent pain symptoms will initiate gabapentin    We will have the patient do an MRI to exclude any significant lumbar disc herniation  Patient is made it clear she did not wish to do active physical therapy but may be a candidate for hands-on massage therapy physical therapy like [facility]    Nav was seen today for back pain and diabetes.    Diagnoses and all orders for this visit:    Type 2 diabetes mellitus with retinopathy, with long-term current use of insulin, macular edema presence unspecified, unspecified laterality, " unspecified retinopathy severity (H)  -     Glycosylated Hemoglobin A1c    Chronic left-sided low back pain with left-sided sciatica  -     MR Lumbar Spine Without Contrast; Future  -     Urinalysis-UC if Indicated  -     Culture, Urine    Anemia, unspecified type  -     Iron and Transferrin Iron Binding Capacity  -     HM1(CBC and Differential)  -     Comprehensive Metabolic Panel  -     Cancel: Chlamydia trachomatis & Neisseria gonorrhoeae, Amplified Detection  -     HM1 (CBC with Diff)    Screen for colon cancer  -     Cancel: Occult Blood(ICT)  -     Occult Blood(ICT); Future    Coronary artery disease involving native coronary artery of native heart without angina pectoris  -     nitroglycerin (NITROSTAT) 0.4 MG SL tablet; Place 1 tablet (0.4 mg total) under the tongue every 5 (five) minutes as needed for chest pain.    Other orders  -     gabapentin (NEURONTIN) 100 MG capsule; Take 100 mg by mouth daily for 7 days, THEN 100 mg 2 (two) times a day for 7 days, THEN 100 mg 3 (three) times a day for 7 days.        Return in about 1 month (around 2/28/2019).       Anticipatory Guidance and Symptomatic Cares Discussed   Advised to call back directly if there are further questions, or if these symptoms fail to improve as anticipated or worsen.  Return to clinic if patient has a clinical concern that warrants an exam.         I spent 20  minutes with this patient face to face, of which 50% or greater was spent in counseling and coordination of care with regards to Nav was seen today for back pain and diabetes.    Diagnoses and all orders for this visit:    Type 2 diabetes mellitus with retinopathy, with long-term current use of insulin, macular edema presence unspecified, unspecified laterality, unspecified retinopathy severity (H)  -     Glycosylated Hemoglobin A1c    Chronic left-sided low back pain with left-sided sciatica  -     MR Lumbar Spine Without Contrast; Future  -     Urinalysis-UC if Indicated  -      Culture, Urine    Anemia, unspecified type  -     Iron and Transferrin Iron Binding Capacity  -     HM1(CBC and Differential)  -     Comprehensive Metabolic Panel  -     Cancel: Chlamydia trachomatis & Neisseria gonorrhoeae, Amplified Detection  -     HM1 (CBC with Diff)    Screen for colon cancer  -     Cancel: Occult Blood(ICT)  -     Occult Blood(ICT); Future    Coronary artery disease involving native coronary artery of native heart without angina pectoris  -     nitroglycerin (NITROSTAT) 0.4 MG SL tablet; Place 1 tablet (0.4 mg total) under the tongue every 5 (five) minutes as needed for chest pain.    Other orders  -     gabapentin (NEURONTIN) 100 MG capsule; Take 100 mg by mouth daily for 7 days, THEN 100 mg 2 (two) times a day for 7 days, THEN 100 mg 3 (three) times a day for 7 days.        Tomas Salazar MD  Family Medicine   [facility] [phone]

## 2021-06-23 NOTE — PATIENT INSTRUCTIONS - HE
Gabapentin for pain  100 mg daily for 7 days then  100 mg twice a day for 7 days then  100 mg 3 times a day  Accomplish her MRI and follow-up in 1 month's time    Also use Tiger balm on your back for pain    Please bring all of your active medications and so we can catalog and update your patient list

## 2021-06-23 NOTE — TELEPHONE ENCOUNTER
Refill Approved    Rx renewed per Medication Renewal Policy. Medication was last renewed on 9/27/18.    Ov: 8/23/18    Kristen Gary, [facility] Triage/Med Refill 1/25/2019     Requested Prescriptions   Pending Prescriptions Disp Refills     nitroglycerin (NITROSTAT) 0.4 MG SL tablet [Pharmacy Med Name: NITROGLYCERIN 0.4 MG TAB SL 0.4 TAB] 60 tablet 0     Sig: PLACE 1 TABLET (0.4 MG TOTAL) UNDER THE TONGUE EVERY 5 (FIVE) MINUTES AS NEEDED FOR CHEST PAIN.    Nitroglycerin Refill Protocol Passed - 1/25/2019 12:43 PM       Passed - Visit with PCP or prescribing provider visit in last 6 months or next 3 months    Last office visit with prescriber/PCP: Visit date not found OR same dept: 8/23/2018 Lolis Noland CNP OR same specialty: 8/23/2018 Lolis Noland CNP Last physical: Visit date not found Last MTM visit: Visit date not found     Next appt within 3 mo: Visit date not found  Next physical within 3 mo: Visit date not found  Prescriber OR PCP: Tomas Salazar MD  Last diagnosis associated with med order: 1. Coronary artery disease involving native coronary artery of native heart without angina pectoris  - nitroglycerin (NITROSTAT) 0.4 MG SL tablet [Pharmacy Med Name: NITROGLYCERIN 0.4 MG TAB SL 0.4 TAB]; Place 1 tablet (0.4 mg total) under the tongue every 5 (five) minutes as needed for chest pain.  Dispense: 60 tablet; Refill: 0    If protocol passes may refill for 6 months if within 3 months of last provider visit (or a total of 9 months).

## 2021-06-25 NOTE — PROGRESS NOTES
Progress Notes by Mathew Freed MD at 3/27/2017  9:10 AM     Author: Mathew Freed MD Service: -- Author Type: Physician    Filed: 3/27/2017  9:48 AM Encounter Date: 3/27/2017 Status: Signed    : Mathew Freed MD (Physician)           Click to link to [facility]     [facility] HE[facility] NOTE       Assessment/Plan:   A 76-year-old [facility] woman presents to clinic today for routine cardiology followup post hospital discharge.     1. Chronic severe systolic congestive heart failure, severe ischemic cardiomyopathy, LVEF of 20%. Clinically, the patient has been well. She is in euvolemic status.  She has no crackers in lungs, no leg edema.    Continue furosemide 20 mg daily, Spironolactone 25 mg daily.   Continue carvedilol 18.75 mg bid and losartan 50 mg daily.    The patient has severely reduced left ventricular ejection fraction more than 3 months, optimized her medications.  She also has chronic left bundle branch block.  She has class I indication of BiV-CRT-ICD for management of systolic congestive heart failure and the prevention of sudden cardiac death.  I discussed this with the patient and her daughter during hospitalization last month and again today.  The patient has no interest in placing BiV-CRT-ICD.  She wants to continue her current treatment.    2. Coronary artery disease. She has no chest pain. Continue continue aspirin, beta-blocker, ARBs and statins.     3. Hypertension. Her blood pressure is controlled. Continue carvedilol and losartan.     4. Hyperlipidemia. Continue Lipitor 40 mg nightly. The patient will check lipid profile  And LFT at Dr. Salazar's office.  She ate breakfast this morning.     5. Chronic kidney disease and type 2 diabetes. No change in her medications. She has been following up with Dr. Salazar recently.    Total 40 minutes were spent in this visit for face to face visit, physical exam, review of current labs/imaging studies, plan for ongoing treatment.    Thank you for the  opportunity to be involved in the care of Nav Laurent. If you have any questions, please feel free to contact me.  I will see the patient again in 3 months.     History of Present Illness:   It is my pleasure to see Ms. Nav Laurent today at the [facility] for routine cardiology followup post hospital discharge. Mrs. Laurent is a 76-year-old [facility] woman with a medical history of coronary artery disease, status post myocardial infarction x2, status post bare metal stents to LAD, severe ischemic cardiomyopathy with left ventricular ejection fraction 20%, hypertension, hyperlipidemia, and type 2 diabetes. The patient does not speak English. Her medical history was translated by a professional [facility] .     The patient was admitted to hospital last months for acute on chronic systolic congestive heart failure after she was off Lasix.  The patient was treated with diuresis.  Since hospital discharge, the patient has been doing quite well.  She denies any chest pain, shortness of breath, palpitations, orthopnea, PND, or leg swelling. She complains of intermittent mild lightheadedness, but no syncope. Her symptoms are similar to before. She has been taking her medication regularly.  Her blood pressure and heart rate in normal range.     Past Medical History:     Patient Active Problem List   Diagnosis   ? Atherosclerosis   ? Alternating Esotropia   ? Chronic Obstructive Pulmonary Disease   ? Murmurs   ? Vitamin D Deficiency   ? Hyperlipidemia   ? Nonproliferative Retinopathy   ? Acute Myocardial Infarction   ? Coronary Artery Disease   ? Ischemic Cardiomyopathy   ? Congestive Heart Failure   ? Ischemic Stroke  left side weakness arms/legs   ? Stenosis Of Larynx   ? Esophageal Reflux   ? Anemia   ? Acute on chronic diastolic CHF (congestive heart failure), NYHA class 3   ? Hyperlipidemia   ? CKD (chronic kidney disease) stage 3, GFR 30-59 ml/min   ? ACS (acute coronary syndrome)   ? Iron deficiency  anemia   ? Myofascial pain syndrome   ? Lumbago   ? Joint Pain, Localized in the Shoulder    ? Cervicalgia   ? Acute on chronic systolic CHF (congestive heart failure), NYHA class 3   ? Hypertension   ? Pain of upper abdomen   ? Type 2 diabetes mellitus with ophthalmic manifestations   ? Bruit of right carotid artery   ? Elevated troponin   ? Calculus of gallbladder without cholecystitis   ? Coronary artery disease involving native coronary artery of native heart without angina pectoris       Past Surgical History:     Past Surgical History:   Procedure Laterality Date   ? CARDIAC CATHETERIZATION     ? ESOPHAGOGASTRODUODENOSCOPY N/A 2/7/2017    Procedure: ESOPHAGOGASTRODUODENOSCOPY ;  Surgeon: Mitchell Mcbride MD;  [place] [facility] [place]:    ? TRACHEOSTOMY         Family History:   Reviewed no family history of coronary artery disease or congestive heart failure.    Social History:    reports that she has never smoked. She has never used smokeless tobacco. She reports that she does not drink alcohol or use illicit drugs.    Review of Systems:   General: WNL  Eyes: WNL  Ears/Nose/Throat: WNL  Lungs: Cough, Shortness of Breath  Heart: WNL  Stomach: WNL  Bladder: WNL  Muscle/Joints: Muscle Weakness  Skin: WNL  Nervous System: Loss of Balance  Mental Health: WNL     Blood: WNL    Meds:     Current Outpatient Prescriptions:   ?  acetaminophen 500 mg coapsule, Take 2 capsules by mouth twice daily as needed for back pain (Patient taking differently: Take 1,000 mg by mouth 2 (two) times a day as needed for pain (back pain). ), Disp: 120 capsule, Rfl: 0  ?  albuterol (PROVENTIL) 2.5 mg /3 mL (0.083 %) nebulizer solution, Take 3 mL (2.5 mg total) by nebulization every 6 (six) hours as needed for wheezing., Disp: 75 mL, Rfl: 12  ?  alcohol swabs (ALCOHOL PREP PADS) PadM, USE FOUR TIMES A DAY AS DIRECTED/ SIV 4 ZAUG IB HNUB LI TUS ELIZABETH MOB QHIA, Disp: 200 each, Rfl: 10  ?  aspirin 325 MG EC tablet, Take 1 tablet  (325 mg total) by mouth daily., Disp: 100 tablet, Rfl: 2  ?  atorvastatin (LIPITOR) 40 MG tablet, Take 1 tablet (40 mg total) by mouth bedtime., Disp: 90 tablet, Rfl: 1  ?  bisacodyl (DULCOLAX, BISACODYL,) 5 mg EC tablet, Take 2 tablets (10 mg total) by mouth daily as needed for constipation., Disp: 30 tablet, Rfl: 1  ?  blood glucose test strips, Test blood sugars four times daily -- before meals and bedtime, Disp: 200 each, Rfl: 11  ?  blood pressure monitor Kit, Check blood pressure daily, Disp: 1 each, Rfl: 0  ?  carboxymethylcellulose (REFRESH LIQUIGEL) 1 % ophthalmic solution, 1 to 2 drops affected eye as needed for dryness (Patient taking differently: Apply 1-2 drops to eye as needed. 1 to 2 drops affected eye as needed for dryness), Disp: 30 mL, Rfl: 12  ?  carvedilol (COREG) 12.5 MG tablet, Take 1 tablet (12.5 mg total) by mouth 2 (two) times a day with meals., Disp: 180 tablet, Rfl: 1  ?  carvedilol (COREG) 6.25 MG tablet, Take 1 tablet (6.25 mg total) by mouth 2 (two) times a day with meals. Take a total dose of 18.75 mg twice a day, Disp: 60 tablet, Rfl: 3  ?  ferrous sulfate 325 (65 FE) MG tablet, Take 1 tablet by mouth daily with breakfast., Disp: , Rfl:   ?  furosemide (LASIX) 20 MG tablet, Take 20 mg by mouth daily., Disp: , Rfl:   ?  LANTUS SOLOSTAR 100 unit/mL (3 mL) pen, Inject 8 Units under the skin bedtime. E11.39, Disp: 15 mL, Rfl: 4  ?  losartan (COZAAR) 100 MG tablet, Take 50 mg by mouth daily. Indications: Chronic Heart Failure, Disp: , Rfl:   ?  nitroglycerin (NITROSTAT) 0.4 MG SL tablet, Place 1 tablet (0.4 mg total) under the tongue every 5 (five) minutes as needed for chest pain., Disp: 90 tablet, Rfl: 3  ?  NOVOLOG FLEXPEN 100 unit/mL injection pen, Inject 3 Units under the skin 2 (two) times daily before lunch and supper., Disp: 15 mL, Rfl: 4  ?  omeprazole (PRILOSEC) 20 MG capsule, Take 1 capsule (20 mg total) by mouth 2 (two) times a day before meals., Disp: 60 capsule, Rfl: 3  ?   "pen needle, diabetic (NOVOFINE 32) 32 gauge x 1/4\" Ndle, 32 G x 6 MM - Used to inject Novolog and Lantus insulin. Need 4 needles per day. E11.39, Disp: 150 each, Rfl: 4  ?  spironolactone (ALDACTONE) 25 MG tablet, Take 1 tablet (25 mg total) by mouth daily., Disp: 90 tablet, Rfl: 3  ?  ipratropium-albuterol (COMBIVENT RESPIMAT)  mcg/actuation Mist inhaler, Inhale 1 puff 4 (four) times a day., Disp: 1 Inhaler, Rfl: 11    Allergies:   Review of patient's allergies indicates no known allergies.      Objective:      Physical Exam  (!) 99 lb (44.9 kg)  4' 8.5\" (1.435 m)  Body mass index is 21.8 kg/(m^2).  /54 (Patient Site: Right Arm, Patient Position: Sitting, Cuff Size: Adult Small)  Pulse (!) 56  Resp 16  Ht 4' 8.5\" (1.435 m)  Wt (!) 99 lb (44.9 kg)  BMI 21.8 kg/m2    General Appearance:   Awake, Alert, No acute distress.   HEENT:  Pupil equal and reactive to light. No scleral icterus; the mucous membranes were moist.   Neck: No cervical bruits. No JVD. No thyromegaly.     Chest: The spine was straight. The chest was symmetric.   Lungs:   Respirations unlabored; Lungs are clear to auscultation. No crackles. No wheezing.   Cardiovascular:   Regular rhythm and rate, normal first and second heart sounds with no murmurs. No rubs or gallops.    Abdomen:  Soft. No tenderness. Non-distended. Bowels sounds are present   Extremities: Equal tibial pulses. No leg edema.   Skin: No rashes or ulcers. Warm, Dry.   Musculoskeletal: No tenderness. No deformity.   Neurologic: Mood and affect are appropriate. No focal deficits.         EKG: Personally reviewed  Normal sinus rhythm with sinus arrhythmia   Left bundle branch block   Abnormal ECG   When compared with ECG of 03-JAN-2017 09:56,   No significant change was found     Cardiac Imaging Studies  Cardiac Imaging Studies  ECHO on 7-8-2014:  Summary  1. The left ventricle is borderline enlarged. Left ventricular systolic  performance is severely reduced. The " ejection fraction is estimated to be  20%.  2. There is severe global reduction in left ventricular systolic  performance with relative preservation of the lateral and posterior bases.  3. No significant valvular heart disease is identified on this study.      When compared to the prior real-time echocardiogram dated 12 May 2014,  there has been little appreciable interval change. Specifically, it is  this reader's impression that left ventricular systolic performance  appears similar on both studies.    ECHO on 2-9-2017:    Left ventricle ejection fraction is severely decreased. The calculated left ventricular ejection fraction is 19%.    Akinesis involving the entire anterior, anteroseptal, septal and apical segments    Mild mitral regurgitation    Pleural effusion is present      Lab Review   Lab Results   Component Value Date     03/06/2017    K 4.4 03/06/2017     03/06/2017    CO2 24 03/06/2017    BUN 19 03/06/2017    CREATININE 0.87 03/06/2017    CALCIUM 9.4 03/06/2017     Lab Results   Component Value Date    WBC 6.1 03/17/2017    WBC 6.6 07/09/2014    HGB 13.4 03/17/2017    HCT 41.7 03/17/2017    MCV 87 03/17/2017     03/17/2017     Lab Results   Component Value Date    CHOL 241 (H) 09/30/2016    CHOL 247 (H) 07/08/2014     Lab Results   Component Value Date    HDL 60 09/30/2016    HDL 68 07/08/2014     Lab Results   Component Value Date    LDLCALC 162 (H) 07/08/2014     Lab Results   Component Value Date    TRIG 87 07/08/2014     No components found for: CHOLHDL  Lab Results   Component Value Date    TROPONINI 0.27 02/08/2017     Lab Results   Component Value Date    BNP 3092 (H) 02/09/2017     Lab Results   Component Value Date    TSH 2.19 02/17/2017

## 2021-07-03 NOTE — ADDENDUM NOTE
Addendum Note by Yamile Laurent MA at 4/24/2017 11:59 PM     Author: Yamile Laurent MA Service: -- Author Type: Medical Assistant    Filed: 4/26/2017  1:15 PM Date of Service: 4/24/2017 11:59 PM Status: Signed    : Yamile Laurent MA (Medical Assistant)    Encounter addended by: Yamile Laurent MA on: 4/26/2017  1:15 PM<BR>     Actions taken: Visit Navigator Flowsheet section accepted

## 2021-07-14 PROBLEM — I50.9 CHF EXACERBATION (H): Status: RESOLVED | Noted: 2017-02-08 | Resolved: 2017-03-06
